# Patient Record
Sex: FEMALE | Race: OTHER | HISPANIC OR LATINO
[De-identification: names, ages, dates, MRNs, and addresses within clinical notes are randomized per-mention and may not be internally consistent; named-entity substitution may affect disease eponyms.]

---

## 2017-08-17 ENCOUNTER — APPOINTMENT (OUTPATIENT)
Dept: SURGERY | Facility: CLINIC | Age: 57
End: 2017-08-17

## 2017-08-17 VITALS
HEART RATE: 75 BPM | OXYGEN SATURATION: 99 % | DIASTOLIC BLOOD PRESSURE: 80 MMHG | WEIGHT: 170 LBS | TEMPERATURE: 97.5 F | SYSTOLIC BLOOD PRESSURE: 132 MMHG

## 2017-08-17 DIAGNOSIS — F15.90 OTHER STIMULANT USE, UNSPECIFIED, UNCOMPLICATED: ICD-10-CM

## 2017-08-17 DIAGNOSIS — Z56.0 UNEMPLOYMENT, UNSPECIFIED: ICD-10-CM

## 2017-08-17 DIAGNOSIS — Z86.39 PERSONAL HISTORY OF OTHER ENDOCRINE, NUTRITIONAL AND METABOLIC DISEASE: ICD-10-CM

## 2017-08-17 DIAGNOSIS — Z87.898 PERSONAL HISTORY OF OTHER SPECIFIED CONDITIONS: ICD-10-CM

## 2017-08-17 DIAGNOSIS — Z78.9 OTHER SPECIFIED HEALTH STATUS: ICD-10-CM

## 2017-08-17 DIAGNOSIS — K80.20 CALCULUS OF GALLBLADDER W/OUT CHOLECYSTITIS W/OUT OBSTRUCTION: ICD-10-CM

## 2017-08-17 RX ORDER — LEVOTHYROXINE SODIUM 25 UG/1
25 TABLET ORAL
Refills: 0 | Status: ACTIVE | COMMUNITY

## 2017-08-17 SDOH — ECONOMIC STABILITY - INCOME SECURITY: UNEMPLOYMENT, UNSPECIFIED: Z56.0

## 2018-04-26 ENCOUNTER — APPOINTMENT (OUTPATIENT)
Dept: SURGERY | Facility: CLINIC | Age: 58
End: 2018-04-26
Payer: MEDICAID

## 2018-04-26 VITALS
SYSTOLIC BLOOD PRESSURE: 116 MMHG | DIASTOLIC BLOOD PRESSURE: 75 MMHG | WEIGHT: 182 LBS | HEIGHT: 67 IN | BODY MASS INDEX: 28.56 KG/M2 | TEMPERATURE: 98.5 F | HEART RATE: 67 BPM

## 2018-04-26 PROCEDURE — 99213 OFFICE O/P EST LOW 20 MIN: CPT

## 2018-10-08 ENCOUNTER — APPOINTMENT (OUTPATIENT)
Dept: SURGERY | Facility: CLINIC | Age: 58
End: 2018-10-08
Payer: MEDICAID

## 2018-10-08 VITALS
SYSTOLIC BLOOD PRESSURE: 139 MMHG | DIASTOLIC BLOOD PRESSURE: 86 MMHG | HEIGHT: 67 IN | TEMPERATURE: 98.5 F | HEART RATE: 64 BPM | BODY MASS INDEX: 27.94 KG/M2 | WEIGHT: 178 LBS

## 2018-10-08 PROCEDURE — 99213 OFFICE O/P EST LOW 20 MIN: CPT

## 2019-10-07 ENCOUNTER — APPOINTMENT (OUTPATIENT)
Dept: SURGERY | Facility: CLINIC | Age: 59
End: 2019-10-07
Payer: MEDICAID

## 2019-10-07 VITALS
BODY MASS INDEX: 25.58 KG/M2 | HEIGHT: 67 IN | TEMPERATURE: 97.6 F | SYSTOLIC BLOOD PRESSURE: 117 MMHG | OXYGEN SATURATION: 99 % | HEART RATE: 61 BPM | DIASTOLIC BLOOD PRESSURE: 69 MMHG | WEIGHT: 163 LBS

## 2019-10-07 PROCEDURE — 99213 OFFICE O/P EST LOW 20 MIN: CPT

## 2019-10-07 RX ORDER — LOSARTAN POTASSIUM 100 MG/1
TABLET, FILM COATED ORAL
Refills: 0 | Status: ACTIVE | COMMUNITY

## 2019-10-07 NOTE — DATA REVIEWED
[FreeTextEntry1] : 	\par Exam requested by:\par CHASIDY ANDERSON MD\par [address] [facility], GROUND FLOOR\par [facility] [address]\par SITE PERFORMED: FLUSHING\par SITE PHONE: [phone]\par Patient: STANLEY BURT\par YOB: 1960\par Phone: [phone]\par MRN: [id number] Acc: [id number]\par Date of Exam: 09-\par  \par EXAM:  DIGITAL BILATERAL SCREENING MAMMOGRAM WITH CAD AND TOMOSYNTHESIS AND BREAST ULTRASOUND\par \par HISTORY:  Screening.\par \par CLINICAL BREAST EXAM:  None\par \par COMPARISON:  Comparison is made with mammogram dated 6/5/17.  \par \par MAMMOGRAM:\par TECHNIQUE:  Full-field digital bilateral mammography is performed in mediolateral oblique, and craniocaudad projections. CAD was utilized. Low-dose full-field digital breast tomosynthesis examination was performed with 3D acquisitions and C-View synthesized 2D reconstructed images. \par \par FINDINGS:\par BREAST COMPOSITION:  The breasts are extremely dense, which lowers the sensitivity of mammography.\par \par There is no dominant density or mass in either breast. There is no architectural distortion or suspicious cluster of microcalcification. No skin thickening or nipple retraction is present.\par \par ULTRASOUND:\par TECHNIQUE:  Sonographic examination is performed of both breasts with a high-resolution linear transducer. The upper outer, upper inner, lower outer, and lower inner quadrants, as well as the retroareolar and axillary regions were imaged. \par \par FINDINGS: \par \par Right breast\par \par Upper inner quadrant: There is no sonographic evidence of solid or cystic nodule.\par \par Lower inner quadrant: There is no sonographic evidence of solid or cystic nodule.\par \par Lower outer quadrant: There is no sonographic evidence of solid or cystic nodule.\par \par Upper-outer quadrant: At 9:00 5 cm from the nipple, there is a 6mm unchanged cyst. There is otherwise no sonographic evidence of solid or cystic nodule.\par \par Retroareolar: There is no sonographic evidence of solid or cystic nodule.\par \par Axilla: Unremarkable\par \par Left breast\par \par Upper-outer quadrant: At 2:00 6 cm from the nipple, there is an 8mm cyst not substantially changed. There is otherwise no sonographic evidence of solid or cystic nodule.\par \par Lower outer quadrant: There is no sonographic evidence of solid or cystic nodule.\par \par Lower inner quadrant: At 6:00 3 cm  from the nipple, there is an unchanged 8mm cyst. There is otherwise no sonographic evidence of solid or cystic nodule.\par \par Upper inner quadrant: There is no sonographic evidence of solid or cystic nodule.\par \par Retroareolar: There is no sonographic evidence of solid or cystic nodule.\par \par Axilla: Unremarkable\par \par IMPRESSION:  No mammographic or sonographic evidence of malignancy.\par \par FOLLOW-UP:  Annual screening.\par \par \par ASSESSMENT:  BI-RADS Category 2:  Benign.\par \par The false-negative rate of mammography is approximately 10%. \par \par As per the FDA requirements, a layman's letter has been generated and sent to your patient stating the results and recommendations of this breast imaging study. We have entered your patient into our reminder system and will notify them when they are due for their next breast imaging exam. \par \par Thank you for the opportunity to participate in the care of this patient.  \par  \par KIKI BRICENO MD  - Electronically Signed: 09- 12:16 PM \par Physician to Physician Direct Line is: [phone]\par Exam requested by:CHASIDY ANDERSON MD\par

## 2019-10-07 NOTE — HISTORY OF PRESENT ILLNESS
[de-identified] : This is  a 59 years old patient presenting today for a breast exam and to discuss the results of her recent  breast imaging. Patient had a BL breast US and   BL breast Mammogram on 09/20/2019. Deemed BIRADS category 2 .  Patient denies any trauma and she has no nipple discharge. Patient denies any fever, night sweats or loss of appetite.    There is no family history of breast carcinoma.  her last menstrual period was 6 years ago.  Patient is on no hormonal replacement therapy. \par  [de-identified] : patient diagnosed with hypertension  3 months ago \par \par

## 2019-10-07 NOTE — PHYSICAL EXAM
[Alert] : alert [Oriented to Place] : oriented to place [Oriented to Person] : oriented to person [Oriented to Time] : oriented to time [Anxious] : anxious [de-identified] :  Neck supple. Trachea midline. Thyroid isthmus barely palpable, lobes not felt. [de-identified] : The patient is alert, well-groomed, and cheerful. [de-identified] : No signs of  dyspnea, orthopnea. Good S1 and  S2 [de-identified] : BL breast are  symmetric. No masses; nipples without discharge. No palpable supraclavicular masses. Axillas are benign.\par \par  [de-identified] : Thorax symmetric with good excursion. Lungs resonant. Breath sounds vesicular with no added sounds.

## 2019-10-07 NOTE — PLAN
[FreeTextEntry1] : Patient is presenting for a follow up visit.  Patient is doing well. Results of her breast imaging and physical examination findings were discussed in details.   Patient was advised to have BL              breast US and Mammogram in  September 2020 and return after the tests. Importance of monthly self-breast examination was reinforced.  Patient's questions and concerns addressed to patient's satisfaction.\par  \par  Return after the tests or if needed . \par Self-breast examination    \par

## 2020-08-02 ENCOUNTER — EMERGENCY (EMERGENCY)
Facility: HOSPITAL | Age: 60
LOS: 1 days | Discharge: HOME | End: 2020-08-02
Attending: EMERGENCY MEDICINE
Payer: MEDICAID

## 2020-08-02 VITALS
SYSTOLIC BLOOD PRESSURE: 173 MMHG | HEART RATE: 84 BPM | RESPIRATION RATE: 16 BRPM | WEIGHT: 167.55 LBS | HEIGHT: 64 IN | TEMPERATURE: 98 F | DIASTOLIC BLOOD PRESSURE: 84 MMHG | OXYGEN SATURATION: 97 %

## 2020-08-02 VITALS
SYSTOLIC BLOOD PRESSURE: 116 MMHG | RESPIRATION RATE: 18 BRPM | TEMPERATURE: 99 F | OXYGEN SATURATION: 98 % | HEART RATE: 64 BPM | DIASTOLIC BLOOD PRESSURE: 71 MMHG

## 2020-08-02 LAB
ALBUMIN SERPL ELPH-MCNC: 4.1 G/DL — SIGNIFICANT CHANGE UP (ref 3.5–5)
ALP SERPL-CCNC: 107 U/L — SIGNIFICANT CHANGE UP (ref 40–120)
ALT FLD-CCNC: 29 U/L DA — SIGNIFICANT CHANGE UP (ref 10–60)
ANION GAP SERPL CALC-SCNC: 5 MMOL/L — SIGNIFICANT CHANGE UP (ref 5–17)
AST SERPL-CCNC: 22 U/L — SIGNIFICANT CHANGE UP (ref 10–40)
BILIRUB SERPL-MCNC: 1.1 MG/DL — SIGNIFICANT CHANGE UP (ref 0.2–1.2)
BUN SERPL-MCNC: 12 MG/DL — SIGNIFICANT CHANGE UP (ref 7–18)
CALCIUM SERPL-MCNC: 8.8 MG/DL — SIGNIFICANT CHANGE UP (ref 8.4–10.5)
CHLORIDE SERPL-SCNC: 109 MMOL/L — HIGH (ref 96–108)
CO2 SERPL-SCNC: 26 MMOL/L — SIGNIFICANT CHANGE UP (ref 22–31)
CREAT SERPL-MCNC: 0.59 MG/DL — SIGNIFICANT CHANGE UP (ref 0.5–1.3)
GLUCOSE SERPL-MCNC: 144 MG/DL — HIGH (ref 70–99)
HCT VFR BLD CALC: 39.5 % — SIGNIFICANT CHANGE UP (ref 34.5–45)
HGB BLD-MCNC: 12.5 G/DL — SIGNIFICANT CHANGE UP (ref 11.5–15.5)
MAGNESIUM SERPL-MCNC: 2.3 MG/DL — SIGNIFICANT CHANGE UP (ref 1.6–2.6)
MCHC RBC-ENTMCNC: 31.3 PG — SIGNIFICANT CHANGE UP (ref 27–34)
MCHC RBC-ENTMCNC: 31.6 GM/DL — LOW (ref 32–36)
MCV RBC AUTO: 98.8 FL — SIGNIFICANT CHANGE UP (ref 80–100)
NRBC # BLD: 0 /100 WBCS — SIGNIFICANT CHANGE UP (ref 0–0)
PLATELET # BLD AUTO: 242 K/UL — SIGNIFICANT CHANGE UP (ref 150–400)
POTASSIUM SERPL-MCNC: 3.9 MMOL/L — SIGNIFICANT CHANGE UP (ref 3.5–5.3)
POTASSIUM SERPL-SCNC: 3.9 MMOL/L — SIGNIFICANT CHANGE UP (ref 3.5–5.3)
PROT SERPL-MCNC: 8 G/DL — SIGNIFICANT CHANGE UP (ref 6–8.3)
RBC # BLD: 4 M/UL — SIGNIFICANT CHANGE UP (ref 3.8–5.2)
RBC # FLD: 12 % — SIGNIFICANT CHANGE UP (ref 10.3–14.5)
SODIUM SERPL-SCNC: 140 MMOL/L — SIGNIFICANT CHANGE UP (ref 135–145)
T4 AB SER-ACNC: 9.5 UG/DL — SIGNIFICANT CHANGE UP (ref 4.6–12)
TSH SERPL-MCNC: 1.79 UU/ML — SIGNIFICANT CHANGE UP (ref 0.34–4.82)
WBC # BLD: 9.25 K/UL — SIGNIFICANT CHANGE UP (ref 3.8–10.5)
WBC # FLD AUTO: 9.25 K/UL — SIGNIFICANT CHANGE UP (ref 3.8–10.5)

## 2020-08-02 PROCEDURE — 83735 ASSAY OF MAGNESIUM: CPT

## 2020-08-02 PROCEDURE — 93005 ELECTROCARDIOGRAM TRACING: CPT

## 2020-08-02 PROCEDURE — 93010 ELECTROCARDIOGRAM REPORT: CPT

## 2020-08-02 PROCEDURE — 84436 ASSAY OF TOTAL THYROXINE: CPT

## 2020-08-02 PROCEDURE — 99284 EMERGENCY DEPT VISIT MOD MDM: CPT | Mod: 25

## 2020-08-02 PROCEDURE — 99284 EMERGENCY DEPT VISIT MOD MDM: CPT

## 2020-08-02 PROCEDURE — 70450 CT HEAD/BRAIN W/O DYE: CPT

## 2020-08-02 PROCEDURE — 96374 THER/PROPH/DIAG INJ IV PUSH: CPT

## 2020-08-02 PROCEDURE — 85027 COMPLETE CBC AUTOMATED: CPT

## 2020-08-02 PROCEDURE — 36415 COLL VENOUS BLD VENIPUNCTURE: CPT

## 2020-08-02 PROCEDURE — 84443 ASSAY THYROID STIM HORMONE: CPT

## 2020-08-02 PROCEDURE — 80053 COMPREHEN METABOLIC PANEL: CPT

## 2020-08-02 PROCEDURE — 96375 TX/PRO/DX INJ NEW DRUG ADDON: CPT

## 2020-08-02 PROCEDURE — 70450 CT HEAD/BRAIN W/O DYE: CPT | Mod: 26

## 2020-08-02 RX ORDER — KETOROLAC TROMETHAMINE 30 MG/ML
15 SYRINGE (ML) INJECTION ONCE
Refills: 0 | Status: DISCONTINUED | OUTPATIENT
Start: 2020-08-02 | End: 2020-08-02

## 2020-08-02 RX ORDER — DIPHENHYDRAMINE HCL 50 MG
25 CAPSULE ORAL ONCE
Refills: 0 | Status: COMPLETED | OUTPATIENT
Start: 2020-08-02 | End: 2020-08-02

## 2020-08-02 RX ORDER — METOCLOPRAMIDE HCL 10 MG
10 TABLET ORAL ONCE
Refills: 0 | Status: COMPLETED | OUTPATIENT
Start: 2020-08-02 | End: 2020-08-02

## 2020-08-02 RX ADMIN — Medication 10 MILLIGRAM(S): at 14:29

## 2020-08-02 RX ADMIN — Medication 15 MILLIGRAM(S): at 14:29

## 2020-08-02 RX ADMIN — Medication 15 MILLIGRAM(S): at 16:41

## 2020-08-02 RX ADMIN — Medication 25 MILLIGRAM(S): at 14:29

## 2020-08-02 NOTE — ED PROVIDER NOTE - NSFOLLOWUPCLINICS_GEN_ALL_ED_FT
[facility] - Ophthalmology  Ophthalmology  [address], [address]  [place] [address]  Phone: [phone]  Fax:   Follow Up Time: 1-3 Days    [facility]  Neurology  [address] [place] [address]  Phone: [phone]  Fax: [phone]  Follow Up Time: 1-3 Days

## 2020-08-02 NOTE — ED PROVIDER NOTE - PATIENT PORTAL LINK FT
You can access the FollowMyHealth Patient Portal offered by [facility] by registering at the following website: [email or web address]. By joining [employer]’s FollowMyHealth portal, you will also be able to view your health information using other applications (apps) compatible with our system.

## 2020-08-02 NOTE — ED PROVIDER NOTE - PROGRESS NOTE DETAILS
EKG - nsr, rate 73, no ectopy EKG - nsr, rate 73, no ectopy  labs - wnl  CT head pending  Will sign out to day shift to f/u imaging and reassess Cho:  Pt received in signout to f/u CT head.  Pt feels better and CT read as unremarkable.  Advised strict return precautions and PMD, neuro, and ophtho.

## 2020-08-02 NOTE — ED ADULT NURSE NOTE - OBJECTIVE STATEMENT
Pt c/o headache and  palpitation  for few days. Pt denies falls. EKG ordered. No acute distress noted, denies chest pain, no shortness of breath indicated. Safety maintained.

## 2020-08-02 NOTE — ED PROVIDER NOTE - NSFOLLOWUPINSTRUCTIONS_ED_ALL_ED_FT
General Headache Without Cause  A headache is pain or discomfort that is felt around the head or neck area. There are many causes and types of headaches. In some cases, the cause may not be found.  Follow these instructions at home:  Watch your condition for any changes. Let your doctor know about them. Take these steps to help with your condition:  Managing pain         Take over-the-counter and prescription medicines only as told by your doctor.Lie down in a dark, quiet room when you have a headache.If told, put ice on your head and neck area:  Put ice in a plastic bag.Place a towel between your skin and the bag.Leave the ice on for 20 minutes, 2–3 times per day.If told, put heat on the affected area. Use the heat source that your doctor recommends, such as a moist heat pack or a heating pad.   Place a towel between your skin and the heat source. Leave the heat on for 20–30 minutes. Remove the heat if your skin turns bright red. This is very important if you are unable to feel pain, heat, or cold. You may have a greater risk of getting burned.Keep lights dim if bright lights bother you or make your headaches worse.Eating and drinking     Eat meals on a regular schedule.If you drink alcohol:   Limit how much you use to:   0–1 drink a day for women. 0–2 drinks a day for men. Be aware of how much alcohol is in your drink. In the [place] one drink equals one 12 oz bottle of beer (355 mL), one 5 oz glass of wine (148 mL), or one 1½ oz glass of hard liquor (44 mL).Stop drinking caffeine, or reduce how much caffeine you drink.General instructions        Keep a journal to find out if certain things bring on headaches. For example, write down:  What you eat and drink.How much sleep you get.Any change to your diet or medicines.Get a massage or try other ways to relax.Limit stress.Sit up straight. Do not tighten (tense) your muscles.Do not use any products that contain nicotine or tobacco. This includes cigarettes, e-cigarettes, and chewing tobacco. If you need help quitting, ask your doctor.Exercise regularly as told by your doctor.Get enough sleep. This often means 7–9 hours of sleep each night.Keep all follow-up visits as told by your doctor. This is important.Contact a doctor if:  Your symptoms are not helped by medicine.You have a headache that feels different than the other headaches.You feel sick to your stomach (nauseous) or you throw up (vomit).You have a fever.Get help right away if:  Your headache gets very bad quickly.Your headache gets worse after a lot of physical activity.You keep throwing up.You have a stiff neck.You have trouble seeing.You have trouble speaking.You have pain in the eye or ear.Your muscles are weak or you lose muscle control.You lose your balance or have trouble walking.You feel like you will pass out (faint) or you pass out.You are mixed up (confused).You have a seizure.Summary  A headache is pain or discomfort that is felt around the head or neck area.There are many causes and types of headaches. In some cases, the cause may not be found.Keep a journal to help find out what causes your headaches. Watch your condition for any changes. Let your doctor know about them.Contact a doctor if you have a headache that is different from usual, or if your headache is not helped by medicine.Get help right away if your headache gets very bad, you throw up, you have trouble seeing, you lose your balance, or you have a seizure.This information is not intended to replace advice given to you by your health care provider. Make sure you discuss any questions you have with your health care provider.    Document Released: 09/26/2009 Document Revised: 07/08/2019 Document Reviewed: 07/08/2019  Elsevier Patient Education © 2020 [employer] Inc[employer]

## 2020-08-02 NOTE — ED PROVIDER NOTE - NS ED ROS FT
CONSTITUTIONAL: no fever, no chills   EYES: no visual changes, no eye pain   ENMT: no nasal congestion, no throat pain, +floater   CARDIOVASCULAR: no chest pain, no edema, no palpitations   RESPIRATORY: no shortness of breath, no cough   GASTROINTESTINAL: no abdominal pain, no nausea, no vomiting, no diarrhea, no constipation   GENITOURINARY: no dysuria, no frequency  MUSCULOSKELETAL: no joint pains, no myalgias, no back pain   SKIN: no rashes  NEUROLOGICAL: no weakness, +headache, no dizziness, no slurred speech, no syncope   PSYCHIATRIC: no known mental health illness   HEME/LYMPH: no lymphadenopathy      All other ROS negative except as per HPI

## 2020-08-02 NOTE — ED PROVIDER NOTE - OBJECTIVE STATEMENT
59 yo F pmh of thyroid disease and HTN presents with HA x a few months, worse today. Had episode of palpitations today. Also with L eye floater. Denies n/v, trauma, eye pain, other acute complaints. Pt would like CT of head.

## 2020-10-15 ENCOUNTER — APPOINTMENT (OUTPATIENT)
Dept: SURGERY | Facility: CLINIC | Age: 60
End: 2020-10-15
Payer: MEDICAID

## 2020-10-15 VITALS
HEIGHT: 67 IN | BODY MASS INDEX: 27.47 KG/M2 | HEART RATE: 76 BPM | SYSTOLIC BLOOD PRESSURE: 130 MMHG | DIASTOLIC BLOOD PRESSURE: 82 MMHG | WEIGHT: 175 LBS | OXYGEN SATURATION: 99 % | TEMPERATURE: 98 F

## 2020-10-15 PROCEDURE — 99213 OFFICE O/P EST LOW 20 MIN: CPT

## 2020-10-15 NOTE — PHYSICAL EXAM
[Oriented to Person] : oriented to person [Oriented to Place] : oriented to place [Alert] : alert [Calm] : calm [Oriented to Time] : oriented to time [de-identified] : Neck supple. Trachea midline. Thyroid isthmus barely palpable, lobes not felt.\par   [de-identified] : She  is alert, well-groomed, and cheerful.\par   [de-identified] :  BL breast are symmetric. No masses; nipples without discharge. No palpable supraclavicular masses. Axillas are benign. [de-identified] : anicteric.  Nasal mucosa pink, septum midline. Oral mucosa pink.  Tongue midline, Pharynx without exudates.\par

## 2020-10-15 NOTE — HISTORY OF PRESENT ILLNESS
[de-identified] : Patient reports no interval changes to her overall health status or medical history  [de-identified] : This is  a 60 year   old patient presenting today for a breast exam and to discuss the results of her recent  breast imaging. She is also reporting left breast pain.  Patient had a BL breast US  and   BL breast Mammogram on 08/31/2020 .  Mammo was deemed BIRADS category 1 and US was deemed 3  and showed left breast cysts.    Ms. BUENO denies any trauma and she has no nipple discharge. Patient denies any fever, night sweats or loss of appetite.   There is no family history of breast carcinoma.  Patient is on no hormonal replacement therapy. She is postmenopausal

## 2020-10-15 NOTE — PLAN
[FreeTextEntry1] : Ms. BUENO  is presenting  today for an evaluation .  she is doing well and offers no complaints.  Results of  her recent  imaging and physical examination findings were discussed in details.   She  was advised to have left   breast US   in  February and return after the tests. Importance of monthly self-breast examination was reinforced.  Patient's questions and concerns addressed to patient's satisfaction.\par \par Vitamin E daily for breast pain \par Avoid caffein and Chocolates to prevent breast pain

## 2021-03-26 ENCOUNTER — EMERGENCY (EMERGENCY)
Facility: HOSPITAL | Age: 61
LOS: 1 days | Discharge: HOME | End: 2021-03-26
Attending: STUDENT IN AN ORGANIZED HEALTH CARE EDUCATION/TRAINING PROGRAM
Payer: MEDICAID

## 2021-03-26 VITALS
HEART RATE: 84 BPM | HEIGHT: 64 IN | RESPIRATION RATE: 20 BRPM | SYSTOLIC BLOOD PRESSURE: 142 MMHG | DIASTOLIC BLOOD PRESSURE: 83 MMHG | TEMPERATURE: 99 F | OXYGEN SATURATION: 98 % | WEIGHT: 175.05 LBS

## 2021-03-26 LAB
ALBUMIN SERPL ELPH-MCNC: 3.2 G/DL — LOW (ref 3.5–5)
ALP SERPL-CCNC: 94 U/L — SIGNIFICANT CHANGE UP (ref 40–120)
ALT FLD-CCNC: 40 U/L DA — SIGNIFICANT CHANGE UP (ref 10–60)
ANION GAP SERPL CALC-SCNC: 9 MMOL/L — SIGNIFICANT CHANGE UP (ref 5–17)
AST SERPL-CCNC: 25 U/L — SIGNIFICANT CHANGE UP (ref 10–40)
BASOPHILS # BLD AUTO: 0.02 K/UL — SIGNIFICANT CHANGE UP (ref 0–0.2)
BASOPHILS NFR BLD AUTO: 0.3 % — SIGNIFICANT CHANGE UP (ref 0–2)
BILIRUB SERPL-MCNC: 0.7 MG/DL — SIGNIFICANT CHANGE UP (ref 0.2–1.2)
BUN SERPL-MCNC: 5 MG/DL — LOW (ref 7–18)
CALCIUM SERPL-MCNC: 8.6 MG/DL — SIGNIFICANT CHANGE UP (ref 8.4–10.5)
CHLORIDE SERPL-SCNC: 104 MMOL/L — SIGNIFICANT CHANGE UP (ref 96–108)
CO2 SERPL-SCNC: 25 MMOL/L — SIGNIFICANT CHANGE UP (ref 22–31)
CREAT SERPL-MCNC: 0.52 MG/DL — SIGNIFICANT CHANGE UP (ref 0.5–1.3)
EOSINOPHIL # BLD AUTO: 0.1 K/UL — SIGNIFICANT CHANGE UP (ref 0–0.5)
EOSINOPHIL NFR BLD AUTO: 1.6 % — SIGNIFICANT CHANGE UP (ref 0–6)
GLUCOSE SERPL-MCNC: 138 MG/DL — HIGH (ref 70–99)
HCT VFR BLD CALC: 33 % — LOW (ref 34.5–45)
HGB BLD-MCNC: 10.8 G/DL — LOW (ref 11.5–15.5)
IMM GRANULOCYTES NFR BLD AUTO: 0.8 % — SIGNIFICANT CHANGE UP (ref 0–1.5)
LYMPHOCYTES # BLD AUTO: 0.94 K/UL — LOW (ref 1–3.3)
LYMPHOCYTES # BLD AUTO: 15.3 % — SIGNIFICANT CHANGE UP (ref 13–44)
MAGNESIUM SERPL-MCNC: 2.1 MG/DL — SIGNIFICANT CHANGE UP (ref 1.6–2.6)
MCHC RBC-ENTMCNC: 31.5 PG — SIGNIFICANT CHANGE UP (ref 27–34)
MCHC RBC-ENTMCNC: 32.7 GM/DL — SIGNIFICANT CHANGE UP (ref 32–36)
MCV RBC AUTO: 96.2 FL — SIGNIFICANT CHANGE UP (ref 80–100)
MONOCYTES # BLD AUTO: 0.45 K/UL — SIGNIFICANT CHANGE UP (ref 0–0.9)
MONOCYTES NFR BLD AUTO: 7.3 % — SIGNIFICANT CHANGE UP (ref 2–14)
NEUTROPHILS # BLD AUTO: 4.58 K/UL — SIGNIFICANT CHANGE UP (ref 1.8–7.4)
NEUTROPHILS NFR BLD AUTO: 74.7 % — SIGNIFICANT CHANGE UP (ref 43–77)
NRBC # BLD: 0 /100 WBCS — SIGNIFICANT CHANGE UP (ref 0–0)
PLATELET # BLD AUTO: 207 K/UL — SIGNIFICANT CHANGE UP (ref 150–400)
POTASSIUM SERPL-MCNC: 3.6 MMOL/L — SIGNIFICANT CHANGE UP (ref 3.5–5.3)
POTASSIUM SERPL-SCNC: 3.6 MMOL/L — SIGNIFICANT CHANGE UP (ref 3.5–5.3)
PROT SERPL-MCNC: 7.1 G/DL — SIGNIFICANT CHANGE UP (ref 6–8.3)
RBC # BLD: 3.43 M/UL — LOW (ref 3.8–5.2)
RBC # FLD: 11.5 % — SIGNIFICANT CHANGE UP (ref 10.3–14.5)
SODIUM SERPL-SCNC: 138 MMOL/L — SIGNIFICANT CHANGE UP (ref 135–145)
WBC # BLD: 6.14 K/UL — SIGNIFICANT CHANGE UP (ref 3.8–10.5)
WBC # FLD AUTO: 6.14 K/UL — SIGNIFICANT CHANGE UP (ref 3.8–10.5)

## 2021-03-26 PROCEDURE — 99283 EMERGENCY DEPT VISIT LOW MDM: CPT

## 2021-03-26 PROCEDURE — 93005 ELECTROCARDIOGRAM TRACING: CPT

## 2021-03-26 PROCEDURE — 99285 EMERGENCY DEPT VISIT HI MDM: CPT

## 2021-03-26 PROCEDURE — 85025 COMPLETE CBC W/AUTO DIFF WBC: CPT

## 2021-03-26 PROCEDURE — 83735 ASSAY OF MAGNESIUM: CPT

## 2021-03-26 PROCEDURE — 36415 COLL VENOUS BLD VENIPUNCTURE: CPT

## 2021-03-26 PROCEDURE — 80053 COMPREHEN METABOLIC PANEL: CPT

## 2021-03-26 RX ORDER — METOCLOPRAMIDE HCL 10 MG
10 TABLET ORAL ONCE
Refills: 0 | Status: COMPLETED | OUTPATIENT
Start: 2021-03-26 | End: 2021-03-26

## 2021-03-26 RX ORDER — ACETAMINOPHEN 500 MG
650 TABLET ORAL ONCE
Refills: 0 | Status: COMPLETED | OUTPATIENT
Start: 2021-03-26 | End: 2021-03-26

## 2021-03-26 RX ORDER — SODIUM CHLORIDE 9 MG/ML
1000 INJECTION INTRAMUSCULAR; INTRAVENOUS; SUBCUTANEOUS ONCE
Refills: 0 | Status: COMPLETED | OUTPATIENT
Start: 2021-03-26 | End: 2021-03-26

## 2021-03-26 RX ADMIN — Medication 650 MILLIGRAM(S): at 09:09

## 2021-03-26 RX ADMIN — Medication 10 MILLIGRAM(S): at 08:15

## 2021-03-26 RX ADMIN — SODIUM CHLORIDE 1000 MILLILITER(S): 9 INJECTION INTRAMUSCULAR; INTRAVENOUS; SUBCUTANEOUS at 08:15

## 2021-03-26 NOTE — ED PROVIDER NOTE - OBJECTIVE STATEMENT
62yo F pmhx headaches, tension-type, responsive to meds, p/w known Covid-19 infection currently and headache. patient states that she began to experience posterior head pain along L neck and into base of scalp yesterday after waking. patient states pain is aching, worse with stretching neck and turning head, improved at neutral position and not moving. patient denies frontal pain, other head pain, vision changes, dizziness. patient states that pain was not sudden, not thunderclap, not worst pain of her life. denies falls/trauma. patient states that she took ibuprofen for ha with minimal improvement, prompting visit to ED. Patient also endorsing having episode of palpitations 2 days ago that self resolved. Denies syncope, cp, sob, abd pain, cough. Endorses intermittent anxiety history. No other complaints at this time. Covid resolving, dx 3 weeks ago.

## 2021-03-26 NOTE — ED PROVIDER NOTE - CLINICAL SUMMARY MEDICAL DECISION MAKING FREE TEXT BOX
patient p/w basae of skull headache radiating up from L scalenes and trapezius muscle highly suspicious for msk etiology  no neuro symptoms, incr pain w sidebending movement, current covid infection, correlating with presentation of myalgias  previous CTH less than 1 yr ago for same presentation negative, no neuro deficits  no suspicion vasc etiology based on h&p

## 2021-03-26 NOTE — ED PROVIDER NOTE - PROGRESS NOTE DETAILS
patient states that she feels improved after tylenol and fluids. patient does not have allergy to tylenol. patient now stating that she does not want CTH. shared deciison making performed. in agreement tht pain appears to be 2/2 msk etiology given worse w movement, palpation of trapezius, a/w myalgias and current covid infection.   repeat neuro exams benign.  labs unremarkable. communicated to patient. will dc w pcp f/up, pain control instructions and manage expectations for covid infection and symptoms. patient cont to feel well, stating pain is minimal now, only occurs with neck sidebending to L and palpation of trap.   does not want to CT. stable for dc w eduation, pcp f/up and return precautions.

## 2021-03-26 NOTE — ED PROVIDER NOTE - CARE PLAN
Principal Discharge DX:	Episodic tension-type headache, not intractable  Secondary Diagnosis:	COVID-19  Secondary Diagnosis:	Palpitations

## 2021-03-26 NOTE — ED ADULT NURSE NOTE - OBJECTIVE STATEMENT
pt is here for headache.  pt stated that pain to back of head, denied chest pain or sob, denied N/v/D, no distress noted at this time.

## 2021-03-26 NOTE — ED PROVIDER NOTE - PATIENT PORTAL LINK FT
You can access the FollowMyHealth Patient Portal offered by [facility] by registering at the following website: [email or web address]. By joining [employer]’s FollowMyHealth portal, you will also be able to view your health information using other applications (apps) compatible with our system.

## 2021-03-26 NOTE — ED PROVIDER NOTE - MUSCULOSKELETAL, MLM
Spine appears normal, range of motion is not limited, no muscle or joint tenderness +ttp L trapezius region extending into neck, reproducing pain into base of scalp

## 2021-03-26 NOTE — ED ADULT TRIAGE NOTE - CHIEF COMPLAINT QUOTE
C/o body aches with headache not relieved with ibuprofen.  Positive covid since Monday.  Pain localised to back of head

## 2021-04-15 ENCOUNTER — EMERGENCY (EMERGENCY)
Facility: HOSPITAL | Age: 61
LOS: 1 days | Discharge: HOME | End: 2021-04-15
Attending: STUDENT IN AN ORGANIZED HEALTH CARE EDUCATION/TRAINING PROGRAM
Payer: MEDICAID

## 2021-04-15 VITALS
OXYGEN SATURATION: 97 % | RESPIRATION RATE: 17 BRPM | TEMPERATURE: 99 F | SYSTOLIC BLOOD PRESSURE: 115 MMHG | HEART RATE: 90 BPM | DIASTOLIC BLOOD PRESSURE: 71 MMHG

## 2021-04-15 VITALS
DIASTOLIC BLOOD PRESSURE: 98 MMHG | RESPIRATION RATE: 18 BRPM | WEIGHT: 175.05 LBS | HEART RATE: 113 BPM | HEIGHT: 64 IN | SYSTOLIC BLOOD PRESSURE: 166 MMHG | OXYGEN SATURATION: 99 % | TEMPERATURE: 99 F

## 2021-04-15 PROBLEM — G44.219 EPISODIC TENSION-TYPE HEADACHE, NOT INTRACTABLE: Chronic | Status: ACTIVE | Noted: 2021-03-26

## 2021-04-15 LAB
ALBUMIN SERPL ELPH-MCNC: 4.1 G/DL — SIGNIFICANT CHANGE UP (ref 3.5–5)
ALP SERPL-CCNC: 122 U/L — HIGH (ref 40–120)
ALT FLD-CCNC: 41 U/L DA — SIGNIFICANT CHANGE UP (ref 10–60)
ANION GAP SERPL CALC-SCNC: 8 MMOL/L — SIGNIFICANT CHANGE UP (ref 5–17)
APPEARANCE UR: CLEAR — SIGNIFICANT CHANGE UP
AST SERPL-CCNC: 16 U/L — SIGNIFICANT CHANGE UP (ref 10–40)
BASOPHILS # BLD AUTO: 0.05 K/UL — SIGNIFICANT CHANGE UP (ref 0–0.2)
BASOPHILS NFR BLD AUTO: 0.7 % — SIGNIFICANT CHANGE UP (ref 0–2)
BILIRUB SERPL-MCNC: 0.8 MG/DL — SIGNIFICANT CHANGE UP (ref 0.2–1.2)
BILIRUB UR-MCNC: NEGATIVE — SIGNIFICANT CHANGE UP
BUN SERPL-MCNC: 11 MG/DL — SIGNIFICANT CHANGE UP (ref 7–18)
CALCIUM SERPL-MCNC: 9.2 MG/DL — SIGNIFICANT CHANGE UP (ref 8.4–10.5)
CHLORIDE SERPL-SCNC: 105 MMOL/L — SIGNIFICANT CHANGE UP (ref 96–108)
CO2 SERPL-SCNC: 25 MMOL/L — SIGNIFICANT CHANGE UP (ref 22–31)
COLOR SPEC: YELLOW — SIGNIFICANT CHANGE UP
CREAT SERPL-MCNC: 0.52 MG/DL — SIGNIFICANT CHANGE UP (ref 0.5–1.3)
D DIMER BLD IA.RAPID-MCNC: 176 NG/ML DDU — SIGNIFICANT CHANGE UP
DIFF PNL FLD: NEGATIVE — SIGNIFICANT CHANGE UP
EOSINOPHIL # BLD AUTO: 0.12 K/UL — SIGNIFICANT CHANGE UP (ref 0–0.5)
EOSINOPHIL NFR BLD AUTO: 1.6 % — SIGNIFICANT CHANGE UP (ref 0–6)
GLUCOSE SERPL-MCNC: 146 MG/DL — HIGH (ref 70–99)
GLUCOSE UR QL: NEGATIVE — SIGNIFICANT CHANGE UP
HCT VFR BLD CALC: 37.8 % — SIGNIFICANT CHANGE UP (ref 34.5–45)
HGB BLD-MCNC: 12 G/DL — SIGNIFICANT CHANGE UP (ref 11.5–15.5)
IMM GRANULOCYTES NFR BLD AUTO: 0.3 % — SIGNIFICANT CHANGE UP (ref 0–1.5)
KETONES UR-MCNC: NEGATIVE — SIGNIFICANT CHANGE UP
LEUKOCYTE ESTERASE UR-ACNC: NEGATIVE — SIGNIFICANT CHANGE UP
LYMPHOCYTES # BLD AUTO: 0.93 K/UL — LOW (ref 1–3.3)
LYMPHOCYTES # BLD AUTO: 12.6 % — LOW (ref 13–44)
MAGNESIUM SERPL-MCNC: 2.3 MG/DL — SIGNIFICANT CHANGE UP (ref 1.6–2.6)
MCHC RBC-ENTMCNC: 31.3 PG — SIGNIFICANT CHANGE UP (ref 27–34)
MCHC RBC-ENTMCNC: 31.7 GM/DL — LOW (ref 32–36)
MCV RBC AUTO: 98.4 FL — SIGNIFICANT CHANGE UP (ref 80–100)
MONOCYTES # BLD AUTO: 0.26 K/UL — SIGNIFICANT CHANGE UP (ref 0–0.9)
MONOCYTES NFR BLD AUTO: 3.5 % — SIGNIFICANT CHANGE UP (ref 2–14)
NEUTROPHILS # BLD AUTO: 6 K/UL — SIGNIFICANT CHANGE UP (ref 1.8–7.4)
NEUTROPHILS NFR BLD AUTO: 81.3 % — HIGH (ref 43–77)
NITRITE UR-MCNC: NEGATIVE — SIGNIFICANT CHANGE UP
NRBC # BLD: 0 /100 WBCS — SIGNIFICANT CHANGE UP (ref 0–0)
PH UR: 6.5 — SIGNIFICANT CHANGE UP (ref 5–8)
PLATELET # BLD AUTO: 305 K/UL — SIGNIFICANT CHANGE UP (ref 150–400)
POTASSIUM SERPL-MCNC: 3.8 MMOL/L — SIGNIFICANT CHANGE UP (ref 3.5–5.3)
POTASSIUM SERPL-SCNC: 3.8 MMOL/L — SIGNIFICANT CHANGE UP (ref 3.5–5.3)
PROT SERPL-MCNC: 8.3 G/DL — SIGNIFICANT CHANGE UP (ref 6–8.3)
PROT UR-MCNC: NEGATIVE — SIGNIFICANT CHANGE UP
RBC # BLD: 3.84 M/UL — SIGNIFICANT CHANGE UP (ref 3.8–5.2)
RBC # FLD: 12.5 % — SIGNIFICANT CHANGE UP (ref 10.3–14.5)
SODIUM SERPL-SCNC: 138 MMOL/L — SIGNIFICANT CHANGE UP (ref 135–145)
SP GR SPEC: 1.01 — SIGNIFICANT CHANGE UP (ref 1.01–1.02)
UROBILINOGEN FLD QL: NEGATIVE — SIGNIFICANT CHANGE UP
WBC # BLD: 7.38 K/UL — SIGNIFICANT CHANGE UP (ref 3.8–10.5)
WBC # FLD AUTO: 7.38 K/UL — SIGNIFICANT CHANGE UP (ref 3.8–10.5)

## 2021-04-15 PROCEDURE — 84484 ASSAY OF TROPONIN QUANT: CPT

## 2021-04-15 PROCEDURE — 81003 URINALYSIS AUTO W/O SCOPE: CPT

## 2021-04-15 PROCEDURE — 71046 X-RAY EXAM CHEST 2 VIEWS: CPT

## 2021-04-15 PROCEDURE — 99284 EMERGENCY DEPT VISIT MOD MDM: CPT | Mod: 25

## 2021-04-15 PROCEDURE — 83880 ASSAY OF NATRIURETIC PEPTIDE: CPT

## 2021-04-15 PROCEDURE — 96374 THER/PROPH/DIAG INJ IV PUSH: CPT

## 2021-04-15 PROCEDURE — 93005 ELECTROCARDIOGRAM TRACING: CPT

## 2021-04-15 PROCEDURE — 83735 ASSAY OF MAGNESIUM: CPT

## 2021-04-15 PROCEDURE — 80053 COMPREHEN METABOLIC PANEL: CPT

## 2021-04-15 PROCEDURE — 71046 X-RAY EXAM CHEST 2 VIEWS: CPT | Mod: 26

## 2021-04-15 PROCEDURE — 99285 EMERGENCY DEPT VISIT HI MDM: CPT

## 2021-04-15 PROCEDURE — 85379 FIBRIN DEGRADATION QUANT: CPT

## 2021-04-15 PROCEDURE — 85025 COMPLETE CBC W/AUTO DIFF WBC: CPT

## 2021-04-15 PROCEDURE — 36415 COLL VENOUS BLD VENIPUNCTURE: CPT

## 2021-04-15 RX ORDER — FAMOTIDINE 10 MG/ML
20 INJECTION INTRAVENOUS ONCE
Refills: 0 | Status: COMPLETED | OUTPATIENT
Start: 2021-04-15 | End: 2021-04-15

## 2021-04-15 RX ORDER — SODIUM CHLORIDE 9 MG/ML
1000 INJECTION INTRAMUSCULAR; INTRAVENOUS; SUBCUTANEOUS ONCE
Refills: 0 | Status: COMPLETED | OUTPATIENT
Start: 2021-04-15 | End: 2021-04-15

## 2021-04-15 RX ADMIN — FAMOTIDINE 20 MILLIGRAM(S): 10 INJECTION INTRAVENOUS at 13:14

## 2021-04-15 RX ADMIN — SODIUM CHLORIDE 1000 MILLILITER(S): 9 INJECTION INTRAMUSCULAR; INTRAVENOUS; SUBCUTANEOUS at 13:00

## 2021-04-15 NOTE — ED PROVIDER NOTE - CLINICAL SUMMARY MEDICAL DECISION MAKING FREE TEXT BOX
62 y/o F pt presents with unwellness feeling and intermittent palpitations. Patient is known to ED and extremely anxious about her health. I had an extensive discussion with patient regarding management of expectations and in the course of COVID-19 explained that it has been 2.5 weeks and a multitude of symptoms can be felt for a long period of time. Discussed I will perform labs, EKG, CXR and if negative, patient will need to follow up as outpatient. Explained to patient she cannot see cardiologist here and the ED will evaluate her for acute or emergent conditions if nonexistent will have to follow up as outpatient. Patient understands and agrees with plan.

## 2021-04-15 NOTE — ED PROVIDER NOTE - CARE PLAN
Principal Discharge DX:	History of COVID-19   Principal Discharge DX:	Chest tightness  Secondary Diagnosis:	History of COVID-19

## 2021-04-15 NOTE — ED PROVIDER NOTE - OBJECTIVE STATEMENT
60 y/o F pt with history of COVID-19 diagnosed 2.5 weeks ago presents with intermittent palpitations and general unwellness. Patient states she does not feel well and was previously here with COVID-19 with CXR negative, unremarkable workup and discharged home. Patient now c/o intermittent palpitations in the morning. Patient was evaluated by her PCP with a negative workup and referred to cardiology but her appointment is not for another month and she cannot wait that long. Patient denies chest pain, chest tightness, shortness of breath, fever, chills, syncope, abdominal pain, nausea, vomiting, and weakness.

## 2021-04-15 NOTE — ED PROVIDER NOTE - MUSCULOSKELETAL, MLM
Spine appears normal, range of motion is not limited, no muscle or joint tenderness, no calf pain, no lower extremity edema.

## 2021-04-15 NOTE — ED PROVIDER NOTE - PATIENT PORTAL LINK FT
You can access the FollowMyHealth Patient Portal offered by [facility] by registering at the following website: [email or web address]. By joining [employer]’s FollowMyHealth portal, you will also be able to view your health information using other applications (apps) compatible with our system. You can access the FollowMyHealth Patient Portal offered by [facility] by registering at the following website: [email or web address]. By joining [employer]’s FollowMyHealth portal, you will also be able to view your health information using other applications (apps) compatible with our system.

## 2021-04-15 NOTE — ED PROVIDER NOTE - PROGRESS NOTE DETAILS
Patient expressing frustration and does not seem to understand . Patient reports intermittent tiredness feeling and "vague" symptoms night be attributable to COVID-19. Labs reviewed and CXR within normal limits however given patient concern, will also perform Troponin, BNP and reassess. labs normal home with prompt cardiology followup

## 2021-04-15 NOTE — ED ADULT NURSE NOTE - OBJECTIVE STATEMENT
axox3 ,nad , palpitation for 3 weeks and sob , and burning sensation in chest area mid sternal , r shoulder pain , left under knee pain , also complains bilateral arms numbness after being sick with COVID, general weakness

## 2021-04-15 NOTE — ED PROVIDER NOTE - ATTESTATION, MLM
vomiting I have reviewed and confirmed nurses' notes for patient's medications, allergies, medical history, and surgical history.

## 2021-04-15 NOTE — ED PROVIDER NOTE - CHPI ED SYMPTOMS NEG
no chest tightness, no abdominal pain/no chest pain/no dizziness/no fever/no nausea/no shortness of breath/no syncope/no vomiting/no chills

## 2021-05-21 ENCOUNTER — APPOINTMENT (OUTPATIENT)
Dept: GASTROENTEROLOGY | Facility: CLINIC | Age: 61
End: 2021-05-21
Payer: MEDICAID

## 2021-05-21 ENCOUNTER — LABORATORY RESULT (OUTPATIENT)
Age: 61
End: 2021-05-21

## 2021-05-21 VITALS
HEART RATE: 79 BPM | OXYGEN SATURATION: 97 % | HEIGHT: 67 IN | WEIGHT: 171 LBS | TEMPERATURE: 97.8 F | BODY MASS INDEX: 26.84 KG/M2 | DIASTOLIC BLOOD PRESSURE: 80 MMHG | SYSTOLIC BLOOD PRESSURE: 120 MMHG

## 2021-05-21 DIAGNOSIS — R10.11 RIGHT UPPER QUADRANT PAIN: ICD-10-CM

## 2021-05-21 DIAGNOSIS — K21.9 GASTRO-ESOPHAGEAL REFLUX DISEASE W/OUT ESOPHAGITIS: ICD-10-CM

## 2021-05-21 DIAGNOSIS — K59.00 CONSTIPATION, UNSPECIFIED: ICD-10-CM

## 2021-05-21 PROCEDURE — 99204 OFFICE O/P NEW MOD 45 MIN: CPT

## 2021-05-21 RX ORDER — FAMOTIDINE 40 MG/1
40 TABLET, FILM COATED ORAL
Qty: 60 | Refills: 3 | Status: ACTIVE | COMMUNITY
Start: 2021-05-21 | End: 1900-01-01

## 2021-05-21 RX ORDER — DICYCLOMINE HYDROCHLORIDE 10 MG/1
10 CAPSULE ORAL 3 TIMES DAILY
Qty: 90 | Refills: 3 | Status: ACTIVE | COMMUNITY
Start: 2021-05-21 | End: 1900-01-01

## 2021-05-21 NOTE — ASSESSMENT
[FreeTextEntry1] : Abdominal Pain: The patient complains of abdominal pain. The patient is to avoid nonsteroidal anti-inflammatory drugs and aspirin.  I recommend a low FOD-MAP diet.  I recommend a trial of Dicyclomine 10 mg tablet PO 3 times a day PRN for the abdominal pain.\par Dyspepsia: The patient complains of dyspeptic symptoms.  The patient was advised to abide by an anti-gas diet.  The patient was given a pamphlet for anti-gas.  The patient and I reviewed the anti-gas diet at length. The patient is to start on a trial of Phazyme one tablet 3 times a day p.r.n. abdominal pain and gas.\par GERD: The patient was advised to avoid late-night meals and dietary indiscretions.  The patient was advised to avoid fried and fatty foods.  The patient was advised to abide by an anti-GERD diet. The patient was given a pamphlet foranti-GERD.  The patient and I reviewed the anti-GERD diet at length.I recommend a trial of Pepcid 40 mg twice a day x 3 months for the symptoms.\par Constipation: The patient complains of constipation. I recommend a high-fiber diet. I recommend a trial of a probiotic such as Align once a day. I recommend a trial of Metamucil once a day for fiber supplementation. I recommend a trial of Miralax 1 packet once a day for the constipation. If the symptoms persist, the patient may require a colonoscopy to assess the symptoms.  The patient was told of the risks and benefits of the procedure.  The patient was told of the risks of perforation, emergency surgery, bleeding, infections and missed lesions.  The patient agreed and will followup to reassess the symptoms.  \par Prior Endoscopic Procedures: The patient had a prior upper endoscopy and colonoscopy performed by another gastroenterologist, Dr. Suzanne Lynch.  I will try to obtain the prior upper endoscopy and colonoscopy reports.  The patient is to sign a record release to obtain those records.\par Blood Work: I recommend blood work to assess the liver. I recommend a CBC, SMA 24, amylase, lipase, ESR, TFTs, MARLENI, rheumatoid factor, celiac sprue panel, IgA, profile for hepatitis A, B, C. ,AFP, alpha 1 anti-trypsin  antibody, ceruloplasmin level, iron, TIBC, ferritin level, AMA, anti smooth muscle antibody and PT/INR/PTT.  I also recommend obtaining the recent blood work performed by the patient's PMD.\par Imaging Study: I recommend an imaging study to assess the symptoms. I recommend an abdominal ultrasound to assess the liver parenchyma and for liver lesions.\par Fatty Liver:  The patient had an imaging study suggestive of fatty infiltration of the liver.  The patient denies any jaundice or pruritus.  The patient denies any alcohol use.  The patient denies taking large doses of nonsteroidal anti-inflammatory drugs or acetaminophen.  The findings are suggestive of fatty liver.  The patient and I had a long discussion regarding the risks of fatty liver and possible progression to cirrhosis.  The patient was told of the possible increased risk of developing liver failure, cirrhosis, ascites, GI bleeding secondary to varices, hepatic encephalopathy, bleeding tendencies and liver cancer.  The patient was told of the importance of follow-up.  The patient was advised to follow up every 6 months for blood work and imaging studies. The patient agreed and will follow up. The patient was advised to lose weight. I recommend a trial of vitamin E supplementation for the fatty liver.  If the liver enzymes remain elevated, the patient may require a trial of Pioglitazone for the fatty liver. I recommend avoid alcohol and hepato-toxic agents.  The patient was also advised to avoid NSAIDs, Acetaminophen and any other hepatotoxic drugs. The patient was also advised not to share needles, razors, scissors, nail clippers, etc.. The patient is to continue close follow-up in our office for blood work and exams.  If the liver enzymes remain elevated, the patient may require a CT guided liver biopsy to assess the liver parenchyma for possible treatment.  We had a long discussion regarding the risks and benefits of the procedure.  The patient was told of the risks of bleeding, perforation, infections, emergency surgery and missing lesions.  The patient agreed and will follow-up to reassess the symptoms.  \par If the symptoms persist, the patient may require an upper endoscopy to assess for peptic ulcer disease versus esophagitis.  The patient was told of the risks and benefits of the procedure.  The patient was told of the risks of perforation, emergency surgery, bleeding, infections and missed lesions.  The patient agreed and will follow-up to reassess the symptoms.\par Follow-up: The patient is to follow-up in the office in 2 to 3 months to reassess the symptoms. The patient was told to call the office if any further problems. \par

## 2021-05-21 NOTE — REVIEW OF SYSTEMS
[Feeling Tired] : feeling tired [Eyesight Problems] : eyesight problems [Abdominal Pain] : abdominal pain [Constipation] : constipation [Heartburn] : heartburn [Arthralgias] : arthralgias [Itching] : itching [Dizziness] : dizziness [Negative] : Heme/Lymph [FreeTextEntry8] : polyuria, nocturia [de-identified] : fay

## 2021-05-21 NOTE — HISTORY OF PRESENT ILLNESS
[None] : had no significant interval events [Nausea] : denies nausea [Vomiting] : denies vomiting [Diarrhea] : denies diarrhea [Yellow Skin Or Eyes (Jaundice)] : denies jaundice [Rectal Pain] : denies rectal pain [Heartburn] : heartburn [Constipation] : constipation [Abdominal Swelling] : abdominal swelling [Abdominal Pain] : abdominal pain [GERD] : gastroesophageal reflux disease [Cholelithiasis] : cholelithiasis [Cholecystectomy] : cholecystectomy [Wt Gain ___ Lbs] : no recent weight gain [Wt Loss ___ Lbs] : no recent weight loss [Hiatus Hernia] : no hiatus hernia [Peptic Ulcer Disease] : no peptic ulcer disease [Pancreatitis] : no pancreatitis [Kidney Stone] : no kidney stone [Inflammatory Bowel Disease] : no inflammatory bowel disease [Irritable Bowel Syndrome] : no irritable bowel syndrome [Diverticulitis] : no diverticulitis [Alcohol Abuse] : no alcohol abuse [Malignancy] : no malignancy [Appendectomy] : no appendectomy [Abdominal Surgery] : no abdominal surgery [de-identified] : The patient is a 61-year-old  female with past medical history significant for hypertension and hypothyroidism, coronary artery disease who was referred to my office by Dr. Radha Pond for abdominal pain, dyspepsia and gastroesophageal reflux disease. The patient also admits to having constipation, change in bowel habits and change in caliber of stool. I was asked to render an opinion for consultation for the above complaints.   The patient states that she is feeling uncomfortable x 8 months.   The patient denies any prior exposure to hepatitis A, B or C.  The patient denies any large doses of nonsteroidal anti-inflammatory drugs or acetaminophen.   The patient denies sharing needles, razors, nail clippers, nail files, scissors, et cetera.  The patient denies any EtOH abuse, cocaine use or intravenous drug use.   The patient denies any prior surgery, blood transfusions, sexual indiscretions, tattoos or piercing.  The patient admits to having prior surgery.  The history of surgery is significant for a prior cholecystectomy, tubal ligation and .  The patient admits to a family history of GI or liver problems.  The patient’s mother had a history of colitis.   The patient complains of abdominal pain.  The patient describes the abdominal pain as a sharp, crampy, intermittent right upper quadrant discomfort that is nonradiating in nature.  The abdominal pain is worse with meals and improves with passing gas or having a bowel movement.  The abdominal pain is described as being mild in nature.  The abdominal pain occurs during the day.  The abdominal pain can occur at any time.   The abdominal pain never has awakened the patient from sleep.  The abdominal pain is not relieved with any medications.  The abdominal pain is associated with abdominal gas and bloating.  The patient denies any nausea or vomiting.  The patient complains of gastroesophageal reflux disease but denies any dysphagia. The gastroesophageal reflux disease is worse after meals and late at night and in the early morning. The gastroesophageal reflux disease is not improved with proton pump inhibitors, H2 blockers and antacids.  The patient denies any atypical chest pain, shortness of breath or palpitations.  The patient denies any diaphoresis. The patient complains of constipation but denies any diarrhea.  The patient has 1 bowel movement a day.  The patient complains of a change in bowel habits.  The patient complains of a change in caliber of stool.   The patient denies having mucus discharge with the bowel movements.  The patient denies any bright red blood per rectum, melena or hematemesis.  The patient complains of rectal pulsation but denies any rectal pain or rectal pruritus. The patient denies any weight loss or anorexia.  She denies any fevers or chills.  The patient complains of occasional pruritus but denies any jaundice.  The patient complains of chronic lower back pain.  The patient had a colonoscopy to the cecum performed at the office on 2011.  The study was very limited secondary to retained solid and liquid stool scattered throughout the colon but no gross lesions were noted.   Unable to comment on small colonic polyps or masses secondary to the poor prep.  The findings revealed mild left-sided diverticulosis and small internal hemorrhoids.  There were no polyps, masses, AVMs or colitis noted.  The patient tolerated the procedure well. The patient admits to having a prior upper endoscopy and colonoscopy performed by another gastroenterologist, Dr. Suzanne Lynch.  According to the patient, the upper endoscopic findings revealed severe gastritis.  The colonoscopy performed on 2018 findings revealed internal hemorrhoids.   The patient's last menstrual period was age 52.  The patient is a .  The patient's first menstrual period was at age 14. The patient admits to a family history of GI problems.  The patient’s mother had a history of colitis. [de-identified] : (-) smoking, (-) ETOH, (-) IVDA\par

## 2021-05-24 ENCOUNTER — APPOINTMENT (OUTPATIENT)
Dept: CARDIOLOGY | Facility: CLINIC | Age: 61
End: 2021-05-24

## 2021-05-24 LAB
A1AT SERPL-MCNC: 115 MG/DL
AFP-TM SERPL-MCNC: 2.3 NG/ML
ALBUMIN SERPL ELPH-MCNC: 4.8 G/DL
ALP BLD-CCNC: 105 U/L
ALT SERPL-CCNC: 27 U/L
AMYLASE/CREAT SERPL: 46 U/L
ANION GAP SERPL CALC-SCNC: 14 MMOL/L
APTT BLD: 36.3 SEC
AST SERPL-CCNC: 19 U/L
BASOPHILS # BLD AUTO: 0.06 K/UL
BASOPHILS NFR BLD AUTO: 0.9 %
BILIRUB SERPL-MCNC: 1.1 MG/DL
BUN SERPL-MCNC: 12 MG/DL
CALCIUM SERPL-MCNC: 9.8 MG/DL
CERULOPLASMIN SERPL-MCNC: 24 MG/DL
CHLORIDE SERPL-SCNC: 102 MMOL/L
CHOLEST SERPL-MCNC: 156 MG/DL
CO2 SERPL-SCNC: 25 MMOL/L
CREAT SERPL-MCNC: 0.52 MG/DL
EOSINOPHIL # BLD AUTO: 1 K/UL
EOSINOPHIL NFR BLD AUTO: 14.8 %
ERYTHROCYTE [SEDIMENTATION RATE] IN BLOOD BY WESTERGREN METHOD: 28 MM/HR
FERRITIN SERPL-MCNC: 233 NG/ML
GGT SERPL-CCNC: 16 U/L
GLUCOSE SERPL-MCNC: 105 MG/DL
HBV CORE IGG+IGM SER QL: NONREACTIVE
HBV CORE IGM SER QL: NONREACTIVE
HBV E AB SER QL: NONREACTIVE
HBV E AG SER QL: NONREACTIVE
HBV SURFACE AB SER QL: NONREACTIVE
HBV SURFACE AG SER QL: NONREACTIVE
HCT VFR BLD CALC: 42.7 %
HCV AB SER QL: NONREACTIVE
HCV S/CO RATIO: 0.19 S/CO
HDLC SERPL-MCNC: 51 MG/DL
HEMOCCULT STL QL: NEGATIVE
HEPATITIS A IGG ANTIBODY: REACTIVE
HGB BLD-MCNC: 12.7 G/DL
IGA SER QL IEP: 489 MG/DL
IMM GRANULOCYTES NFR BLD AUTO: 0.1 %
INR PPP: 1.12 RATIO
IRON SATN MFR SERPL: 35 %
IRON SERPL-MCNC: 123 UG/DL
LDLC SERPL CALC-MCNC: 92 MG/DL
LPL SERPL-CCNC: 28 U/L
LYMPHOCYTES # BLD AUTO: 1.64 K/UL
LYMPHOCYTES NFR BLD AUTO: 24.3 %
MAN DIFF?: NORMAL
MCHC RBC-ENTMCNC: 29.7 GM/DL
MCHC RBC-ENTMCNC: 31.1 PG
MCV RBC AUTO: 104.7 FL
MONOCYTES # BLD AUTO: 0.35 K/UL
MONOCYTES NFR BLD AUTO: 5.2 %
NEUTROPHILS # BLD AUTO: 3.68 K/UL
NEUTROPHILS NFR BLD AUTO: 54.7 %
NONHDLC SERPL-MCNC: 105 MG/DL
PLATELET # BLD AUTO: 290 K/UL
POTASSIUM SERPL-SCNC: 4.4 MMOL/L
PROT SERPL-MCNC: 7.4 G/DL
PT BLD: 13.2 SEC
RBC # BLD: 4.08 M/UL
RBC # FLD: 12.8 %
RHEUMATOID FACT SER QL: <10 IU/ML
SODIUM SERPL-SCNC: 141 MMOL/L
TIBC SERPL-MCNC: 353 UG/DL
TRIGL SERPL-MCNC: 67 MG/DL
TSH SERPL-ACNC: 3.11 UIU/ML
UIBC SERPL-MCNC: 230 UG/DL
WBC # FLD AUTO: 6.74 K/UL

## 2021-05-25 LAB
GLIADIN IGA SER QL: 7.2 UNITS
GLIADIN IGG SER QL: <5 UNITS
GLIADIN PEPTIDE IGA SER-ACNC: NEGATIVE
GLIADIN PEPTIDE IGG SER-ACNC: NEGATIVE
MITOCHONDRIA AB SER IF-ACNC: NORMAL
SMOOTH MUSCLE AB SER QL IF: NORMAL
TTG IGA SER IA-ACNC: <1.2 U/ML
TTG IGA SER-ACNC: NEGATIVE
TTG IGG SER IA-ACNC: 3.6 U/ML
TTG IGG SER IA-ACNC: NEGATIVE

## 2021-05-26 LAB — ANA SER IF-ACNC: NEGATIVE

## 2021-06-01 PROBLEM — N63.0 BREAST MASS: Status: ACTIVE | Noted: 2018-10-08

## 2021-06-03 ENCOUNTER — APPOINTMENT (OUTPATIENT)
Dept: SURGERY | Facility: CLINIC | Age: 61
End: 2021-06-03
Payer: MEDICAID

## 2021-06-03 VITALS
HEIGHT: 67 IN | HEART RATE: 75 BPM | BODY MASS INDEX: 27.47 KG/M2 | SYSTOLIC BLOOD PRESSURE: 108 MMHG | DIASTOLIC BLOOD PRESSURE: 71 MMHG | WEIGHT: 175 LBS

## 2021-06-03 DIAGNOSIS — N63.0 UNSPECIFIED LUMP IN UNSPECIFIED BREAST: ICD-10-CM

## 2021-06-03 PROCEDURE — 99213 OFFICE O/P EST LOW 20 MIN: CPT

## 2021-06-03 NOTE — HISTORY OF PRESENT ILLNESS
[de-identified] : This is  a 61 year   old patient presenting today for a breast exam and to discuss the results of her recent  breast imaging. Patient had a short term left  breast follow up  US   on 05/17/2021. Deemed BIRADS category 3 and showed Stable probably benign finding left breast 1:00 for which continued short-term interval follow-up is recommended in 6 months.  Ms. BUENO denies any trauma and she has no nipple discharge. Patient denies any fever, night sweats or loss of appetite. There is no family history of breast carcinoma. Patient is on no hormonal replacement therapy. She is postmenopausal.  [de-identified] : Patient reports no interval changes to her overall health status or medical history \par she had COVID in March last year

## 2021-06-03 NOTE — PLAN
[FreeTextEntry1] : Ms. BUENO  is presenting  today for an evaluation .  she is doing well and offers no complaints.  Results of  her recent  imaging and physical examination findings were discussed in details.   She  was advised to have   Right     breast US and Mammogram in  November 2021 and return after the tests. Importance of monthly self-breast examination was reinforced.  Patient's questions and concerns addressed to patient's satisfaction.\par \par Vitamin E daily for breast pain \par Avoid caffein and Chocolates to prevent breast pain

## 2021-06-03 NOTE — DATA REVIEWED
[FreeTextEntry1] : PATIENT NAME: Kevin Higuera Gloria\par PATIENT ID: [id number]\par : 1960\par DATE OF EXAM: 2021\par INDICATION:\par Short-term interval follow-up of probably benign finding left breast 1:00 in a\par 61-year-old female.\par TECHNIQUE:\par Real-time ultrasound was performed using a high-frequency linear-array\par transducer. Static images are submitted for review.\par COMPARISON:\par Correlation is made with relevant prior imaging in PACS.\par FINDINGS:\par Left Breast:\par Oval hypoechoic parallel mass 1:00 3 cm from the nipple 6 x 8 x 3 mm, appearing\par more homogeneous and unchanged in size at this time.\par Cyst 3:00 5 cm from the nipple 4 x 1 x 3 mm.\par No new/additional finding left breast.\par IMPRESSION:\par Stable probably benign finding left breast 1:00 for which continued short-term\par interval follow-up is recommended in 6 months to document stability with left\par breast ultrasound.\par Recommendation: Left Limited ultrasound in 6 months.\par The patient is due for a bilateral mammogram in 2021.\par OVERALL ASSESSMENT -- BI-RADS 3 PROBABLY BENIGN\par The patient was sent a letter with the results of the exam and follow-up\par recommendation.\par R92.8\par END OF IMPRESSION\par Signed by: Soha Canales MD\par Signed Date: 2021 3:56 PM EDT

## 2021-06-03 NOTE — PHYSICAL EXAM
[Alert] : alert [Oriented to Person] : oriented to person [Oriented to Place] : oriented to place [Oriented to Time] : oriented to time [Calm] : calm [de-identified] : She  is alert, well-groomed, and cheerful.\par   [de-identified] : anicteric.  Nasal mucosa pink, septum midline. Oral mucosa pink.  Tongue midline, Pharynx without exudates.\par   [de-identified] : Neck supple. Trachea midline. Thyroid isthmus barely palpable, lobes not felt.\par   [de-identified] :  BL breast are symmetric. No masses; nipples without discharge. No palpable supraclavicular masses. Axillas are benign.

## 2021-10-01 ENCOUNTER — OUTPATIENT (OUTPATIENT)
Dept: OUTPATIENT SERVICES | Facility: HOSPITAL | Age: 61
LOS: 1 days | End: 2021-10-01
Payer: MEDICAID

## 2021-10-04 ENCOUNTER — EMERGENCY (EMERGENCY)
Facility: HOSPITAL | Age: 61
LOS: 1 days | Discharge: HOME | End: 2021-10-04
Attending: EMERGENCY MEDICINE
Payer: MEDICAID

## 2021-10-04 VITALS
SYSTOLIC BLOOD PRESSURE: 163 MMHG | TEMPERATURE: 99 F | OXYGEN SATURATION: 98 % | WEIGHT: 169.98 LBS | HEIGHT: 64 IN | RESPIRATION RATE: 19 BRPM | DIASTOLIC BLOOD PRESSURE: 92 MMHG | HEART RATE: 106 BPM

## 2021-10-04 LAB
ALBUMIN SERPL ELPH-MCNC: 4 G/DL — SIGNIFICANT CHANGE UP (ref 3.5–5)
ALP SERPL-CCNC: 101 U/L — SIGNIFICANT CHANGE UP (ref 40–120)
ALT FLD-CCNC: 30 U/L DA — SIGNIFICANT CHANGE UP (ref 10–60)
ANION GAP SERPL CALC-SCNC: 6 MMOL/L — SIGNIFICANT CHANGE UP (ref 5–17)
AST SERPL-CCNC: 21 U/L — SIGNIFICANT CHANGE UP (ref 10–40)
BASOPHILS # BLD AUTO: 0.04 K/UL — SIGNIFICANT CHANGE UP (ref 0–0.2)
BASOPHILS NFR BLD AUTO: 0.5 % — SIGNIFICANT CHANGE UP (ref 0–2)
BILIRUB SERPL-MCNC: 0.7 MG/DL — SIGNIFICANT CHANGE UP (ref 0.2–1.2)
BUN SERPL-MCNC: 14 MG/DL — SIGNIFICANT CHANGE UP (ref 7–18)
CALCIUM SERPL-MCNC: 9 MG/DL — SIGNIFICANT CHANGE UP (ref 8.4–10.5)
CHLORIDE SERPL-SCNC: 109 MMOL/L — HIGH (ref 96–108)
CK SERPL-CCNC: 138 U/L — SIGNIFICANT CHANGE UP (ref 21–215)
CK SERPL-CCNC: 149 U/L — SIGNIFICANT CHANGE UP (ref 21–215)
CO2 SERPL-SCNC: 27 MMOL/L — SIGNIFICANT CHANGE UP (ref 22–31)
CREAT SERPL-MCNC: 0.52 MG/DL — SIGNIFICANT CHANGE UP (ref 0.5–1.3)
EOSINOPHIL # BLD AUTO: 0.23 K/UL — SIGNIFICANT CHANGE UP (ref 0–0.5)
EOSINOPHIL NFR BLD AUTO: 2.7 % — SIGNIFICANT CHANGE UP (ref 0–6)
GLUCOSE SERPL-MCNC: 116 MG/DL — HIGH (ref 70–99)
HCT VFR BLD CALC: 37.6 % — SIGNIFICANT CHANGE UP (ref 34.5–45)
HGB BLD-MCNC: 12.2 G/DL — SIGNIFICANT CHANGE UP (ref 11.5–15.5)
IMM GRANULOCYTES NFR BLD AUTO: 0.4 % — SIGNIFICANT CHANGE UP (ref 0–1.5)
LYMPHOCYTES # BLD AUTO: 1.91 K/UL — SIGNIFICANT CHANGE UP (ref 1–3.3)
LYMPHOCYTES # BLD AUTO: 22.4 % — SIGNIFICANT CHANGE UP (ref 13–44)
MAGNESIUM SERPL-MCNC: 2.2 MG/DL — SIGNIFICANT CHANGE UP (ref 1.6–2.6)
MCHC RBC-ENTMCNC: 31.3 PG — SIGNIFICANT CHANGE UP (ref 27–34)
MCHC RBC-ENTMCNC: 32.4 GM/DL — SIGNIFICANT CHANGE UP (ref 32–36)
MCV RBC AUTO: 96.4 FL — SIGNIFICANT CHANGE UP (ref 80–100)
MONOCYTES # BLD AUTO: 0.44 K/UL — SIGNIFICANT CHANGE UP (ref 0–0.9)
MONOCYTES NFR BLD AUTO: 5.2 % — SIGNIFICANT CHANGE UP (ref 2–14)
NEUTROPHILS # BLD AUTO: 5.86 K/UL — SIGNIFICANT CHANGE UP (ref 1.8–7.4)
NEUTROPHILS NFR BLD AUTO: 68.8 % — SIGNIFICANT CHANGE UP (ref 43–77)
NRBC # BLD: 0 /100 WBCS — SIGNIFICANT CHANGE UP (ref 0–0)
PLATELET # BLD AUTO: 282 K/UL — SIGNIFICANT CHANGE UP (ref 150–400)
POTASSIUM SERPL-MCNC: 4.1 MMOL/L — SIGNIFICANT CHANGE UP (ref 3.5–5.3)
POTASSIUM SERPL-SCNC: 4.1 MMOL/L — SIGNIFICANT CHANGE UP (ref 3.5–5.3)
PROT SERPL-MCNC: 7.6 G/DL — SIGNIFICANT CHANGE UP (ref 6–8.3)
RBC # BLD: 3.9 M/UL — SIGNIFICANT CHANGE UP (ref 3.8–5.2)
RBC # FLD: 11.8 % — SIGNIFICANT CHANGE UP (ref 10.3–14.5)
SODIUM SERPL-SCNC: 142 MMOL/L — SIGNIFICANT CHANGE UP (ref 135–145)
T3 SERPL-MCNC: 100 NG/DL — SIGNIFICANT CHANGE UP (ref 80–200)
T4 AB SER-ACNC: 9.7 UG/DL — SIGNIFICANT CHANGE UP (ref 4.6–12)
TROPONIN I SERPL-MCNC: <0.015 NG/ML — SIGNIFICANT CHANGE UP (ref 0–0.04)
TROPONIN I SERPL-MCNC: <0.015 NG/ML — SIGNIFICANT CHANGE UP (ref 0–0.04)
TSH SERPL-MCNC: 1.32 UU/ML — SIGNIFICANT CHANGE UP (ref 0.34–4.82)
WBC # BLD: 8.51 K/UL — SIGNIFICANT CHANGE UP (ref 3.8–10.5)
WBC # FLD AUTO: 8.51 K/UL — SIGNIFICANT CHANGE UP (ref 3.8–10.5)

## 2021-10-04 PROCEDURE — 82550 ASSAY OF CK (CPK): CPT

## 2021-10-04 PROCEDURE — 71045 X-RAY EXAM CHEST 1 VIEW: CPT | Mod: 26

## 2021-10-04 PROCEDURE — 84436 ASSAY OF TOTAL THYROXINE: CPT

## 2021-10-04 PROCEDURE — 71045 X-RAY EXAM CHEST 1 VIEW: CPT

## 2021-10-04 PROCEDURE — 83735 ASSAY OF MAGNESIUM: CPT

## 2021-10-04 PROCEDURE — 99285 EMERGENCY DEPT VISIT HI MDM: CPT

## 2021-10-04 PROCEDURE — 93010 ELECTROCARDIOGRAM REPORT: CPT

## 2021-10-04 PROCEDURE — 99283 EMERGENCY DEPT VISIT LOW MDM: CPT | Mod: 25

## 2021-10-04 PROCEDURE — 36415 COLL VENOUS BLD VENIPUNCTURE: CPT

## 2021-10-04 PROCEDURE — 80053 COMPREHEN METABOLIC PANEL: CPT

## 2021-10-04 PROCEDURE — 84484 ASSAY OF TROPONIN QUANT: CPT

## 2021-10-04 PROCEDURE — 93005 ELECTROCARDIOGRAM TRACING: CPT

## 2021-10-04 PROCEDURE — 84443 ASSAY THYROID STIM HORMONE: CPT

## 2021-10-04 PROCEDURE — 82962 GLUCOSE BLOOD TEST: CPT

## 2021-10-04 PROCEDURE — 84480 ASSAY TRIIODOTHYRONINE (T3): CPT

## 2021-10-04 PROCEDURE — 85025 COMPLETE CBC W/AUTO DIFF WBC: CPT

## 2021-10-04 RX ORDER — SODIUM CHLORIDE 9 MG/ML
1000 INJECTION INTRAMUSCULAR; INTRAVENOUS; SUBCUTANEOUS ONCE
Refills: 0 | Status: COMPLETED | OUTPATIENT
Start: 2021-10-04 | End: 2021-10-04

## 2021-10-04 RX ADMIN — SODIUM CHLORIDE 1000 MILLILITER(S): 9 INJECTION INTRAMUSCULAR; INTRAVENOUS; SUBCUTANEOUS at 18:30

## 2021-10-04 NOTE — ED PROVIDER NOTE - CLINICAL SUMMARY MEDICAL DECISION MAKING FREE TEXT BOX
62 y/o female c/o palpitations with resultant headache, well appearing, will do labs and reassess. 60 y/o female c/o palpitations with resultant headache, well appearing, will do labs and reassess.  troponin negative x2, patient feeling well, EKG unchanged from prior, home with own cardiology followup

## 2021-10-04 NOTE — ED PROVIDER NOTE - NS ED MD DISPO DISCHARGE
patient stated that he passed out and his friend called the ambulance and as per paramedic his blood sugar was 20, pt reports to be lightheaded
Home

## 2021-10-04 NOTE — ED ADULT NURSE NOTE - OBJECTIVE STATEMENT
presents to the ER c/o on and off palpitations x 2 days states get dizzy  and feeling weak  when getting palpitations   denies chest pain/sob

## 2021-10-04 NOTE — ED PROVIDER NOTE - PATIENT PORTAL LINK FT
You can access the FollowMyHealth Patient Portal offered by [facility] by registering at the following website: [email or web address]. By joining [facility]’s FollowMyHealth portal, you will also be able to view your health information using other applications (apps) compatible with our system.

## 2021-10-04 NOTE — ED PROVIDER NOTE - OBJECTIVE STATEMENT
62 y/o female with PMHx thyroid problems, HTN presents to ED c/o palpitations. Patient states palpitations since yesterday. Patient notes during episodes of palpitations described as rapidly beating heart feels urge to move bowels and lasts several minutes before resolving. Patient notes feels lightheaded as well during episodes with associated headache. Patient notes went to cardiologist in past for same symptoms who said everything was okay. Patient states also feels achy overall during palpitations.

## 2021-10-05 VITALS
RESPIRATION RATE: 18 BRPM | HEART RATE: 62 BPM | SYSTOLIC BLOOD PRESSURE: 130 MMHG | DIASTOLIC BLOOD PRESSURE: 81 MMHG | OXYGEN SATURATION: 99 % | TEMPERATURE: 98 F

## 2021-10-06 ENCOUNTER — INPATIENT (INPATIENT)
Facility: HOSPITAL | Age: 61
LOS: 0 days | Discharge: HOME HEALTH | End: 2021-10-07
Attending: INTERNAL MEDICINE | Admitting: INTERNAL MEDICINE
Payer: MEDICAID

## 2021-10-06 VITALS
TEMPERATURE: 98 F | SYSTOLIC BLOOD PRESSURE: 139 MMHG | HEART RATE: 71 BPM | RESPIRATION RATE: 18 BRPM | OXYGEN SATURATION: 100 % | HEIGHT: 64 IN | DIASTOLIC BLOOD PRESSURE: 59 MMHG

## 2021-10-06 DIAGNOSIS — E03.9 HYPOTHYROIDISM, UNSPECIFIED: ICD-10-CM

## 2021-10-06 DIAGNOSIS — R51.9 HEADACHE, UNSPECIFIED: ICD-10-CM

## 2021-10-06 DIAGNOSIS — Z90.49 ACQUIRED ABSENCE OF OTHER SPECIFIED PARTS OF DIGESTIVE TRACT: Chronic | ICD-10-CM

## 2021-10-06 DIAGNOSIS — R00.2 PALPITATIONS: ICD-10-CM

## 2021-10-06 DIAGNOSIS — Z29.9 ENCOUNTER FOR PROPHYLACTIC MEASURES, UNSPECIFIED: ICD-10-CM

## 2021-10-06 DIAGNOSIS — I10 ESSENTIAL (PRIMARY) HYPERTENSION: ICD-10-CM

## 2021-10-06 DIAGNOSIS — R10.9 UNSPECIFIED ABDOMINAL PAIN: ICD-10-CM

## 2021-10-06 LAB
ALBUMIN SERPL ELPH-MCNC: 4.8 G/DL — SIGNIFICANT CHANGE UP (ref 3.3–5)
ALP SERPL-CCNC: 103 U/L — SIGNIFICANT CHANGE UP (ref 40–120)
ALT FLD-CCNC: 20 U/L — SIGNIFICANT CHANGE UP (ref 4–33)
ANION GAP SERPL CALC-SCNC: 13 MMOL/L — SIGNIFICANT CHANGE UP (ref 7–14)
APPEARANCE UR: CLEAR — SIGNIFICANT CHANGE UP
AST SERPL-CCNC: 21 U/L — SIGNIFICANT CHANGE UP (ref 4–32)
BASOPHILS # BLD AUTO: 0.04 K/UL — SIGNIFICANT CHANGE UP (ref 0–0.2)
BASOPHILS NFR BLD AUTO: 0.6 % — SIGNIFICANT CHANGE UP (ref 0–2)
BILIRUB SERPL-MCNC: 0.9 MG/DL — SIGNIFICANT CHANGE UP (ref 0.2–1.2)
BILIRUB UR-MCNC: NEGATIVE — SIGNIFICANT CHANGE UP
BUN SERPL-MCNC: 8 MG/DL — SIGNIFICANT CHANGE UP (ref 7–23)
CALCIUM SERPL-MCNC: 9.4 MG/DL — SIGNIFICANT CHANGE UP (ref 8.4–10.5)
CHLORIDE SERPL-SCNC: 103 MMOL/L — SIGNIFICANT CHANGE UP (ref 98–107)
CO2 SERPL-SCNC: 25 MMOL/L — SIGNIFICANT CHANGE UP (ref 22–31)
COLOR SPEC: SIGNIFICANT CHANGE UP
CREAT SERPL-MCNC: 0.45 MG/DL — LOW (ref 0.5–1.3)
DIFF PNL FLD: NEGATIVE — SIGNIFICANT CHANGE UP
EOSINOPHIL # BLD AUTO: 0.2 K/UL — SIGNIFICANT CHANGE UP (ref 0–0.5)
EOSINOPHIL NFR BLD AUTO: 2.9 % — SIGNIFICANT CHANGE UP (ref 0–6)
GLUCOSE SERPL-MCNC: 106 MG/DL — HIGH (ref 70–99)
GLUCOSE UR QL: NEGATIVE — SIGNIFICANT CHANGE UP
HCT VFR BLD CALC: 38 % — SIGNIFICANT CHANGE UP (ref 34.5–45)
HGB BLD-MCNC: 12.6 G/DL — SIGNIFICANT CHANGE UP (ref 11.5–15.5)
IANC: 4 K/UL — SIGNIFICANT CHANGE UP (ref 1.5–8.5)
IMM GRANULOCYTES NFR BLD AUTO: 0.1 % — SIGNIFICANT CHANGE UP (ref 0–1.5)
KETONES UR-MCNC: ABNORMAL
LEUKOCYTE ESTERASE UR-ACNC: NEGATIVE — SIGNIFICANT CHANGE UP
LIDOCAIN IGE QN: 32 U/L — SIGNIFICANT CHANGE UP (ref 7–60)
LYMPHOCYTES # BLD AUTO: 2.19 K/UL — SIGNIFICANT CHANGE UP (ref 1–3.3)
LYMPHOCYTES # BLD AUTO: 31.7 % — SIGNIFICANT CHANGE UP (ref 13–44)
MCHC RBC-ENTMCNC: 31.9 PG — SIGNIFICANT CHANGE UP (ref 27–34)
MCHC RBC-ENTMCNC: 33.2 GM/DL — SIGNIFICANT CHANGE UP (ref 32–36)
MCV RBC AUTO: 96.2 FL — SIGNIFICANT CHANGE UP (ref 80–100)
MONOCYTES # BLD AUTO: 0.47 K/UL — SIGNIFICANT CHANGE UP (ref 0–0.9)
MONOCYTES NFR BLD AUTO: 6.8 % — SIGNIFICANT CHANGE UP (ref 2–14)
NEUTROPHILS # BLD AUTO: 4 K/UL — SIGNIFICANT CHANGE UP (ref 1.8–7.4)
NEUTROPHILS NFR BLD AUTO: 57.9 % — SIGNIFICANT CHANGE UP (ref 43–77)
NITRITE UR-MCNC: NEGATIVE — SIGNIFICANT CHANGE UP
NRBC # BLD: 0 /100 WBCS — SIGNIFICANT CHANGE UP
NRBC # FLD: 0 K/UL — SIGNIFICANT CHANGE UP
PH UR: 6.5 — SIGNIFICANT CHANGE UP (ref 5–8)
PLATELET # BLD AUTO: 272 K/UL — SIGNIFICANT CHANGE UP (ref 150–400)
POTASSIUM SERPL-MCNC: 3.8 MMOL/L — SIGNIFICANT CHANGE UP (ref 3.5–5.3)
POTASSIUM SERPL-SCNC: 3.8 MMOL/L — SIGNIFICANT CHANGE UP (ref 3.5–5.3)
PROT SERPL-MCNC: 7.8 G/DL — SIGNIFICANT CHANGE UP (ref 6–8.3)
PROT UR-MCNC: NEGATIVE — SIGNIFICANT CHANGE UP
RBC # BLD: 3.95 M/UL — SIGNIFICANT CHANGE UP (ref 3.8–5.2)
RBC # FLD: 11.8 % — SIGNIFICANT CHANGE UP (ref 10.3–14.5)
SARS-COV-2 RNA SPEC QL NAA+PROBE: SIGNIFICANT CHANGE UP
SODIUM SERPL-SCNC: 141 MMOL/L — SIGNIFICANT CHANGE UP (ref 135–145)
SP GR SPEC: 1.01 — SIGNIFICANT CHANGE UP (ref 1–1.05)
TROPONIN T, HIGH SENSITIVITY RESULT: 7 NG/L — SIGNIFICANT CHANGE UP
UROBILINOGEN FLD QL: SIGNIFICANT CHANGE UP
WBC # BLD: 6.91 K/UL — SIGNIFICANT CHANGE UP (ref 3.8–10.5)
WBC # FLD AUTO: 6.91 K/UL — SIGNIFICANT CHANGE UP (ref 3.8–10.5)

## 2021-10-06 PROCEDURE — 99223 1ST HOSP IP/OBS HIGH 75: CPT

## 2021-10-06 PROCEDURE — 71046 X-RAY EXAM CHEST 2 VIEWS: CPT | Mod: 26

## 2021-10-06 PROCEDURE — 70450 CT HEAD/BRAIN W/O DYE: CPT | Mod: 26

## 2021-10-06 PROCEDURE — 99285 EMERGENCY DEPT VISIT HI MDM: CPT | Mod: 25

## 2021-10-06 PROCEDURE — 93010 ELECTROCARDIOGRAM REPORT: CPT

## 2021-10-06 RX ORDER — LEVOTHYROXINE SODIUM 125 MCG
25 TABLET ORAL DAILY
Refills: 0 | Status: DISCONTINUED | OUTPATIENT
Start: 2021-10-07 | End: 2021-10-07

## 2021-10-06 RX ORDER — PANTOPRAZOLE SODIUM 20 MG/1
40 TABLET, DELAYED RELEASE ORAL
Refills: 0 | Status: DISCONTINUED | OUTPATIENT
Start: 2021-10-06 | End: 2021-10-07

## 2021-10-06 RX ORDER — ENOXAPARIN SODIUM 100 MG/ML
40 INJECTION SUBCUTANEOUS DAILY
Refills: 0 | Status: DISCONTINUED | OUTPATIENT
Start: 2021-10-06 | End: 2021-10-07

## 2021-10-06 RX ORDER — LOSARTAN POTASSIUM 100 MG/1
50 TABLET, FILM COATED ORAL DAILY
Refills: 0 | Status: DISCONTINUED | OUTPATIENT
Start: 2021-10-07 | End: 2021-10-07

## 2021-10-06 NOTE — ED PROVIDER NOTE - PROGRESS NOTE DETAILS
FRANCO Salazar: [facility] with [facility], stat may admit to Dr. Cowart FRANCO Salazar: Left a VM for Dr. Cowart  FRANCO Salazar: Spoke with Dr. Cowart, may admit to him once CTH is resulted. FRANCO Silvestre: Pt signed out to me by FRANCO Salazar pending CT head results, to be admitted to  who already accepted this admission FRANCO Silvestre: CT head without acute findings, admission placed, pt aware, text paged MAR

## 2021-10-06 NOTE — H&P ADULT - PROBLEM SELECTOR PLAN 1
On and off palpitations for 5 months worsened over last week. Associated CP afterwards. ACS ruled out on prior ED visit 10/4, initial troponin here negative. EKG nonischemic  Pt reports normal echo, stress test, holter (4 days) with Dr. Ch in July  Unclear if this is truly cardiac related but patient complaining of persistent palpitations with elevated HR according to home heart monitor. Also with episode of presyncope today  -monitor on tele  -cardiology consult in AM regarding possible longer term monitoring such as ILR  -TTE ordered  -obtain collateral info regarding stress test and holter in past  -check orthostatics

## 2021-10-06 NOTE — ED PROVIDER NOTE - CLINICAL SUMMARY MEDICAL DECISION MAKING FREE TEXT BOX
The patient is a 61y Female, PMHx hypothyroidism and HTN complaining of multiple medical complaints. 4 days ago, patient reports experiencing palpitations, followed by the urge to move her bowels. Associated with an intermittent headache, described as a tight band around the temporal region of her head, and subjective chills and weakness. Today, patient describes a near-syncopal episode upon standing up from bed, where she reports her vision went black and she felt lightheaded, and symptoms resolved after sitting down for a few minutes.  Plan: labs (CBC, CMP, troponin), U/A & cx, EKG, cardiac monitor, CXR, CT head, consult cardiology.

## 2021-10-06 NOTE — ED ADULT TRIAGE NOTE - CHIEF COMPLAINT QUOTE
c/o blurry vision for the past 3 days, generalized weakness, loose stools. pt denies fever, chills, sent in form Cardiologist office for further work up. Hx of HTN and thyroid disease. PERRLA extremities are strong and equal.

## 2021-10-06 NOTE — ED PROVIDER NOTE - ATTENDING CONTRIBUTION TO CARE
I performed a face-to-face evaluation of the patient and performed a history and physical examination. I agree with the history and physical examination.    H/o palpitations. Recent stress, echo, Holter neg. Near-syncope today. Sent to ED by her Cards. Check trop, EKG, TSH, CXR. Not likely PNA: no infectious Sx (eg, purulent cough). Not likely PE or other VTE: PERC or Wells low score, EKG no e/o R-heart strain. Likely admit.

## 2021-10-06 NOTE — ED PROVIDER NOTE - OBJECTIVE STATEMENT
The patient is a 61y Female, PMHx hypothyroidism and HTN complaining of multiple medical complaints. Patient reports experiencing heart palpitations and blurry vision since her COVID infection in March of 2021. Patient reports seeing an ophthalmologist for blurred vision, described as decreased visual acuity in the center of her visual fields, and was told her eye exam was benign. Palpitations were worked up by her cardiologist - reports stress test, echocardiogram, and holter monitor were all benign in July. 4 days ago, patient reports the palpitations were then followed by the urge to move bowels - describes feeling her heart beating rapidly, which lasts several minutes before resolving. Associated with an intermittent headache, described as a tight band around the temporal region of her head, which is relieved with Tylenol, and subjective chills and weakness. Patient was seen 2 days ago at [facility], and was discharged with instructions to follow up with her cardiologist. Today, patient describes a near-syncopal episode upon standing up from bed, where she reports her vision went black and she felt lightheaded, and symptoms resolved after sitting down for a few minutes. At her cardiologist appointment this afternoon, patient was advised to return to the ED for evaluation. Denies any fevers, chest pain, leg pain or swelling, LOC, head trauma, N/V/D/C, black or bloody stools, urinary symptoms, or any other complaints.

## 2021-10-06 NOTE — ED PROVIDER NOTE - NSICDXPASTMEDICALHX_GEN_ALL_CORE_FT
PAST MEDICAL HISTORY:  Episodic tension-type headache, not intractable     HTN (hypertension)     Hypothyroidism

## 2021-10-06 NOTE — ED ADULT NURSE NOTE - BEFAST SPEECH SLURRED
What Type Of Note Output Would You Prefer (Optional)?: Standard Output How Severe Are Your Spot(S)?: mild Have Your Spot(S) Been Treated In The Past?: has not been treated Hpi Title: Evaluation of a Skin Lesion No

## 2021-10-06 NOTE — ED ADULT NURSE NOTE - OBJECTIVE STATEMENT
Pt arriving to intake room 3 A&OX4 ambulatory c/o intermittent palpitations x 3 days. Pt endorsing 1 episode of nausea without vomiting, generalized weakness, diarrhea worse in the AM. Pt states she had near syncopal episode yesterday, was able to sit in chair. Denies LOC, head trauma. Pt denies any physical complaints at this time. Resp even and unlabored. VS as charted. IV access pending. Pt to be admitted. Pt arriving to intake room 3 A&OX4 ambulatory c/o intermittent palpitations x 3 days. Pt endorsing 1 episode of nausea without vomiting, generalized weakness, diarrhea worse in the AM. Pt states she had near syncopal episode yesterday, was able to sit in chair. Denies LOC, head trauma. Pt denies any physical complaints at this time. Resp even and unlabored. VS as charted. 20g IV placed in RAC. Labs drawn and sent. Pt to be admitted.

## 2021-10-06 NOTE — H&P ADULT - PROBLEM SELECTOR PLAN 2
Hx of gastritis and this may be causing abdominal discomfort. Has 8lb weight loss as well  May be precipitant of palpitations? though overall unlikely  -patient has GI f/u one week ago and would benefit from repeat EGD to assess for malignancy vs gastritis  -restart pantoprazole  -maalox  -H pylori antigen Hx of gastritis and this may be causing abdominal discomfort. Has 8lb weight loss as well  May be precipitant of palpitations? though overall unlikely  -patient has GI f/u one week ago and would benefit from repeat EGD to assess for malignancy vs gastritis  -restart pantoprazole  -maalox  -H pylori antigen  -US abdomen ordered

## 2021-10-06 NOTE — H&P ADULT - HISTORY OF PRESENT ILLNESS
61F with PMHx HTN, hypothyroidism, COVID 3/2021 presenting with palpitations for one week. Pt notes beginning of palpitations in May and then had follow up with cardiology with normal holter, stress test and echo in July. She follows with Dr. Ch (cardiology). Palpitations are sudden in onset, not associated with activity, resolves on its own with rest and lasts about a few minutes at a time. After palpitation episodes she sometimes has burning chest pain that self resolves on its own. Not exertional. The last time she had palpitations were in August which resolved on its own. However, over the last week the patient has had palpitations about 4x a day. 2 days ago she was seen at [place] for these complaints and discharged from ER with normal troponins and close f/u with cardiology. Pt saw cardiologist today who sent patient into ER for further evaluation. Note in paper chart states echo and stress within year was normal. Pt also had an episode of lightheadedness, near syncope, HA, sweatiness and chills this AM when getting up from bed. This was associated with the palpitations that eventually self resolved. Of note, pt feels as though during holter she had a lower level of palpitations during the 4 days and not as severe as usual. She also has intermittent blurry vision with episodes and saw an ophthalmologist a recently and was told her eye exam is normal. She is due to see again in one week. In addition, patient has upper abdominal discomfort, decreased appetite, bloating/gas type discomfort with PO intake. This has caused decreased PO overall but denies actual localized abdominal pain. Denies bloody BMs, melena. She has had 8lb weight loss over the last month. She believes she had an abdominal US several weeks ago but does not know the result and is supposed to see GI next week. She had an EGD 3 years ago and was told of gastritis and was placed on pantoprazole but stopped this year. She had a C-scope at that time that was normal as well. She believes she was treated for H pylori 10 years ago as well. Denies FHx of stomach, colon cancer.

## 2021-10-06 NOTE — H&P ADULT - NSHPPHYSICALEXAM_GEN_ALL_CORE
PHYSICAL EXAM:  Vital Signs Last 24 Hrs  T(C): 36.7 (10-06-21 @ 22:04)  T(F): 98.1 (10-06-21 @ 22:04), Max: 98.2 (10-06-21 @ 12:57)  HR: 56 (10-06-21 @ 22:04) (56 - 71)  BP: 118/69 (10-06-21 @ 22:04)  BP(mean): --  RR: 17 (10-06-21 @ 22:04) (17 - 18)  SpO2: 100% (10-06-21 @ 22:04) (100% - 100%)  Wt(kg): --    Constitutional: NAD, awake and alert, well developed  EYES: EOMI, conjunctiva clear  ENT:  Normal Hearing, no tonsillar exudates   Neck: Soft and supple , no thyromegaly   Respiratory: Breath sounds are clear bilaterally, No wheezing, rales or rhonchi, no tachypnea, no accessory muscle use  Cardiovascular: S1 and S2, regular rate and rhythm, no Murmurs, gallops or rubs, no JVD, no leg edema  Gastrointestinal: Bowel Sounds present, soft, nontender, nondistended, no guarding, no rebound  Extremities: No cyanosis or clubbing; warm to touch  Vascular: 2+ peripheral pulses lower ex  Neurological: No focal deficits, CN II-XII intact bilaterally, sensation to light touch intact in all extremities. Pupils are equally reactive to light and symmetrical in size.   Musculoskeletal: 5/5 strength b/l upper and lower extremities; no joint swelling.  Skin: No rashes, no ulcerations

## 2021-10-06 NOTE — H&P ADULT - NSHPREVIEWOFSYSTEMS_GEN_ALL_CORE
ROS:    Constitutional: [ ] fevers [x ] weight loss   HEENT: [ ] dry eyes [ ] eye irritation [x] blurry vision  CV: [x ] chest pain [ ] orthopnea [ x] palpitations   Resp: [ ] cough [ ] shortness of breath [ ] dyspnea [ ] wheezing   GI: [ ] nausea [ ] vomiting [ ] diarrhea [ ] constipation [x ] abd discomfort   : [ ] dysuria [ ] nocturia [ ] hematuria [ ] increased urinary frequency  Musculoskeletal: [ ] back pain [ ] myalgias [ ] arthralgias   Skin: [ ] rash [ ] itch  Neurological: [x ] headache [ ] dizziness [ ] syncope [ ] weakness   Endocrine: [ ] diabetes [ ] thyroid problem  Hematologic/Lymphatic: [ ] anemia [ ] bleeding problem  Allergic/Immunologic: [ ] itchy eyes [ ] nasal discharge   [x ] All other systems negative

## 2021-10-06 NOTE — H&P ADULT - NSHPLABSRESULTS_GEN_ALL_CORE
I have personally reviewed this patient's labs below:                        12.6   6.91  )-----------( 272      ( 06 Oct 2021 17:52 )             38.0     10-06-21 @ 17:52    141  |  103  |  8             --------------------------< 106<H>     3.8  |  25  | 0.45<L>    eGFR AA: 125  eGFR N-AA: 108    Calcium: 9.4  Phosphorus: --  Magnesium: --    AST: 21    ALT: 20  AlkPhos: 103  Protein: 7.8  Albumin: 4.8  TBili: 0.9  D-Bili: --    Total T3 100, T4 9.7, TSH 1.32 - normal 10/4  Troponin I negative x2 10/4      I have personally reviewed this patient's EKG and my independent interpretation is NSR @60bpm, no ST depressions or elevations    I have personally reviewed this patient's CXR and my independent interpretation is clear lungs    Imaging reviewed below:  CTH noncontrast  EXAM:  CT BRAIN        PROCEDURE DATE:  Oct  6 2021         INTERPRETATION:  CT HEAD  HEAD CT    INDICATIONS: Syncope, simple, normal neuro exam, PMHx hypothyroidism and HTN complaining of multiple medical complaints. Patient reports experiencing heart palpitations and blurry vision since her COVID infection in March of 2021. 4 days ago, patient reports the palpitations were then followed by the urge to move bowels - describes feeling her heart beating rapidly, which lasts several minutes before resolving. Associated with an intermittent headache, described as a tight band around the temporal region of her head, which is relieved with Tylenol, and subjective chills and weakness. Patient was seen 2 days ago at  [facility], and was discharged with instructions to follow up with her cardiologist. Today, patient describes a near-syncopal episode upon standing up from bed, where she reports her vision went black and she felt lightheaded, and symptoms resolved after sitting down for a few minutes. At her cardiologist appointment this afternoon, patient was advised to return to the ED for evaluation.    TECHNIQUE:    HEAD CT:  Serial axial images were obtained from the skull base to the vertex without the use of intravenous contrast.    COMPARISON EXAMINATION: Head CT 8/2/2020    FINDINGS:    HEAD CT:    VENTRICLES AND SULCI: Ventricles and sulci are unremarkable for patient age.  INTRA-AXIAL: No intracranial mass, acute hemorrhage, or midline shift is present.  EXTRA-AXIAL: No extra-axial fluid collection is present.  INTRACRANIAL HEMORRHAGE: None.    VISUALIZED SINUSES: No air-fluid levels are identified.  VISUALIZED MASTOIDS:  Clear.  CALVARIUM:  Intact.  MISCELLANEOUS:  None.    SOFT TISSUES: Unremarkable.  BONES: Unremarkable.      IMPRESSION:    HEAD CT: No acute hemorrhage or midline shift.

## 2021-10-06 NOTE — ED PROVIDER NOTE - CARE PLAN
1 Principal Discharge DX:	Headache   Principal Discharge DX:	Headache  Secondary Diagnosis:	Palpitations

## 2021-10-07 ENCOUNTER — TRANSCRIPTION ENCOUNTER (OUTPATIENT)
Age: 61
End: 2021-10-07

## 2021-10-07 VITALS
OXYGEN SATURATION: 100 % | SYSTOLIC BLOOD PRESSURE: 114 MMHG | DIASTOLIC BLOOD PRESSURE: 75 MMHG | RESPIRATION RATE: 18 BRPM | HEART RATE: 60 BPM | TEMPERATURE: 98 F

## 2021-10-07 LAB
ALBUMIN SERPL ELPH-MCNC: 4.4 G/DL — SIGNIFICANT CHANGE UP (ref 3.3–5)
ALP SERPL-CCNC: 98 U/L — SIGNIFICANT CHANGE UP (ref 40–120)
ALT FLD-CCNC: 22 U/L — SIGNIFICANT CHANGE UP (ref 4–33)
ANION GAP SERPL CALC-SCNC: 13 MMOL/L — SIGNIFICANT CHANGE UP (ref 7–14)
AST SERPL-CCNC: 22 U/L — SIGNIFICANT CHANGE UP (ref 4–32)
BILIRUB SERPL-MCNC: 1.2 MG/DL — SIGNIFICANT CHANGE UP (ref 0.2–1.2)
BUN SERPL-MCNC: 7 MG/DL — SIGNIFICANT CHANGE UP (ref 7–23)
CALCIUM SERPL-MCNC: 9.4 MG/DL — SIGNIFICANT CHANGE UP (ref 8.4–10.5)
CHLORIDE SERPL-SCNC: 103 MMOL/L — SIGNIFICANT CHANGE UP (ref 98–107)
CO2 SERPL-SCNC: 23 MMOL/L — SIGNIFICANT CHANGE UP (ref 22–31)
CREAT SERPL-MCNC: 0.46 MG/DL — LOW (ref 0.5–1.3)
CULTURE RESULTS: SIGNIFICANT CHANGE UP
GLUCOSE BLDC GLUCOMTR-MCNC: 98 MG/DL — SIGNIFICANT CHANGE UP (ref 70–99)
GLUCOSE SERPL-MCNC: 101 MG/DL — HIGH (ref 70–99)
HCT VFR BLD CALC: 37.9 % — SIGNIFICANT CHANGE UP (ref 34.5–45)
HCV AB S/CO SERPL IA: 0.17 S/CO — SIGNIFICANT CHANGE UP (ref 0–0.99)
HCV AB SERPL-IMP: SIGNIFICANT CHANGE UP
HGB BLD-MCNC: 12.4 G/DL — SIGNIFICANT CHANGE UP (ref 11.5–15.5)
MAGNESIUM SERPL-MCNC: 2.3 MG/DL — SIGNIFICANT CHANGE UP (ref 1.6–2.6)
MCHC RBC-ENTMCNC: 31.7 PG — SIGNIFICANT CHANGE UP (ref 27–34)
MCHC RBC-ENTMCNC: 32.7 GM/DL — SIGNIFICANT CHANGE UP (ref 32–36)
MCV RBC AUTO: 96.9 FL — SIGNIFICANT CHANGE UP (ref 80–100)
NRBC # BLD: 0 /100 WBCS — SIGNIFICANT CHANGE UP
NRBC # FLD: 0 K/UL — SIGNIFICANT CHANGE UP
PHOSPHATE SERPL-MCNC: 3.2 MG/DL — SIGNIFICANT CHANGE UP (ref 2.5–4.5)
PLATELET # BLD AUTO: 256 K/UL — SIGNIFICANT CHANGE UP (ref 150–400)
POTASSIUM SERPL-MCNC: 4 MMOL/L — SIGNIFICANT CHANGE UP (ref 3.5–5.3)
POTASSIUM SERPL-SCNC: 4 MMOL/L — SIGNIFICANT CHANGE UP (ref 3.5–5.3)
PROT SERPL-MCNC: 7.3 G/DL — SIGNIFICANT CHANGE UP (ref 6–8.3)
RBC # BLD: 3.91 M/UL — SIGNIFICANT CHANGE UP (ref 3.8–5.2)
RBC # FLD: 11.8 % — SIGNIFICANT CHANGE UP (ref 10.3–14.5)
SODIUM SERPL-SCNC: 139 MMOL/L — SIGNIFICANT CHANGE UP (ref 135–145)
SPECIMEN SOURCE: SIGNIFICANT CHANGE UP
WBC # BLD: 6.44 K/UL — SIGNIFICANT CHANGE UP (ref 3.8–10.5)
WBC # FLD AUTO: 6.44 K/UL — SIGNIFICANT CHANGE UP (ref 3.8–10.5)

## 2021-10-07 PROCEDURE — 76700 US EXAM ABDOM COMPLETE: CPT | Mod: 26

## 2021-10-07 RX ORDER — PANTOPRAZOLE SODIUM 20 MG/1
1 TABLET, DELAYED RELEASE ORAL
Qty: 14 | Refills: 0
Start: 2021-10-07 | End: 2021-10-20

## 2021-10-07 RX ADMIN — PANTOPRAZOLE SODIUM 40 MILLIGRAM(S): 20 TABLET, DELAYED RELEASE ORAL at 07:36

## 2021-10-07 RX ADMIN — LOSARTAN POTASSIUM 50 MILLIGRAM(S): 100 TABLET, FILM COATED ORAL at 12:00

## 2021-10-07 RX ADMIN — Medication 25 MICROGRAM(S): at 05:42

## 2021-10-07 RX ADMIN — ENOXAPARIN SODIUM 40 MILLIGRAM(S): 100 INJECTION SUBCUTANEOUS at 12:00

## 2021-10-07 NOTE — PROVIDER CONTACT NOTE (OTHER) - ASSESSMENT
SW met with pt at bedside to confirm address as: [address] and pt has money to pay for cab. SW contacted [facility] ([phone]) who will complete trip. No further SW needs at this time.
Patient asymptomatic at this time, vitals as noted in flowsheet. Upon waking up, HR increased to 55.

## 2021-10-07 NOTE — DISCHARGE NOTE PROVIDER - HOSPITAL COURSE
61F with PMHx HTN, hypothyroidism, COVID 3/2021 presenting with palpitations for one week.    palpitations  - on and off palpitations for 5 months worsened over last week  - ACS ruled out on prior ED visit 10/4  - initial troponin 7  - EKG nonischemic  - reports normal echo, stress test, holter (4 days) with Dr. Ch in July  - unclear if this is truly cardiac related but patient complaining of persistent palpitations with elevated HR according to home heart monitor. Also with episode of presyncope today  - telemetry   - cardio Dr Charlton for possible longer term monitoring such as ILR  - TTE   - obtain collateral info regarding stress test and holter in past  - orthostatics    abdominal discomfort  - hx of gastritis and this may be causing abdominal discomfort  - has 8lb weight loss as well  - may be precipitant of palpitations? though overall unlikely  - patient has GI f/u one week ago and would benefit from repeat EGD to assess for malignancy vs gastritis  - PPI  - maalox  - H pylori antigen    HTN  - losartan     hypothyroidism  - synthroid  - TSH WNL       61F with PMHx HTN, hypothyroidism, COVID 3/2021 presenting with palpitations for one week.    palpitations  - on and off palpitations for 5 months worsened over last week  - ACS ruled out on prior ED visit 10/4  - initial troponin 7  - EKG nonischemic  - reports normal echo, stress test, holter (4 days) with Dr. Bridges in July  - unclear if this is truly cardiac related but patient complaining of persistent palpitations with elevated HR according to home heart monitor. Also with episode of presyncope today  - telemetry   - cardio Dr Charlton for possible longer term monitoring such as ILR - to be done as outpatient     abdominal discomfort  - hx of gastritis and this may be causing abdominal discomfort  - has 8lb weight loss as well  - may be precipitant of palpitations? though overall unlikely  - patient has GI f/u one week ago and would benefit from repeat EGD to assess for malignancy vs gastritis  - PPI  - maalox  - US abdomen - Fatty liver. Cholecystectomy with postcholecystectomy biliary ductal dilatation.  - patient has an appointment with GI on Oct 13 with Dr De Paz    HTN  - losartan     hypothyroidism  - synthroid  - TSH WNL      This case was reviewed with Dr. Cowart. The patient is medically stable and optimized for discharge. All medications were reviewed and prescriptions were sent to [facility].

## 2021-10-07 NOTE — PROVIDER CONTACT NOTE (OTHER) - BACKGROUND
Per ACP, pt is cleared for d/c and needs a taxi to return home.
Patient admitted for near syncopal episode.

## 2021-10-07 NOTE — DISCHARGE NOTE PROVIDER - CARE PROVIDER_API CALL
Manuel De Paz)  Gastroenterology  [address] [place] [address]  Phone: [phone]  Fax: [phone]  Follow Up Time:     Aime Goff)  Cardiology  [address] [place] [address], [address]  [place] [address]  Phone: [phone]  Fax: [phone]  Follow Up Time:

## 2021-10-07 NOTE — DISCHARGE NOTE PROVIDER - NSDCMRMEDTOKEN_GEN_ALL_CORE_FT
losartan 50 mg oral tablet: 1 tab(s) orally once a day  Synthroid 25 mcg (0.025 mg) oral tablet: 1 tab(s) orally once a day   losartan 50 mg oral tablet: 1 tab(s) orally once a day  pantoprazole 40 mg oral delayed release tablet: 1 tab(s) orally once a day (before a meal)  Synthroid 25 mcg (0.025 mg) oral tablet: 1 tab(s) orally once a day

## 2021-10-07 NOTE — CONSULT NOTE ADULT - ATTENDING COMMENTS
seen and agree w/ PA.  palpitations and dizziness ongoing chronically, defied diagnosis with ambulatory monitoring. reassuring noninvasive CV workup.  consider outpatient loop recorder for symptom-rhythm correlation.

## 2021-10-07 NOTE — CONSULT NOTE ADULT - SUBJECTIVE AND OBJECTIVE BOX
HISTORY OF PRESENT ILLNESS: HPI:'    61F with PMHx HTN, hypothyroidism, COVID 3/2021 presenting with palpitations for one week. Pt notes beginning of palpitations in May and then had follow up with cardiology with normal holter, stress test and echo in July. She follows with Dr. Ch (cardiology). Palpitations are sudden in onset, not associated with activity, resolves on its own with rest and lasts about a few minutes at a time. After palpitation episodes she sometimes has burning chest pain that self resolves on its own. Not exertional. The last time she had palpitations were in August which resolved on its own. However, over the last week the patient has had palpitations about 4x a day. 2 days ago she was seen at [facility] for these complaints and discharged from ER with normal troponins and close f/u with cardiology. Pt saw cardiologist today who sent patient into ER for further evaluation. Note in paper chart states echo and stress within year was normal. Pt also had an episode of lightheadedness, near syncope, HA, sweatiness and chills this AM when getting up from bed. This was associated with the palpitations that eventually self resolved. Of note, pt feels as though during holter she had a lower level of palpitations during the 4 days and not as severe as usual. She also has intermittent blurry vision with episodes and saw an ophthalmologist a recently and was told her eye exam is normal. She is due to see again in one week. In addition, patient has upper abdominal discomfort, decreased appetite, bloating/gas type discomfort with PO intake. This has caused decreased PO overall but denies actual localized abdominal pain. Denies bloody BMs, melena. She has had 8lb weight loss over the last month. She believes she had an abdominal US several weeks ago but does not know the result and is supposed to see GI next week. She had an EGD 3 years ago and was told of gastritis and was placed on pantoprazole but stopped this year. She had a C-scope at that time that was normal as well. She believes she was treated for H pylori 10 years ago as well. Denies FHx of stomach, colon cancer. (06 Oct 2021 22:49)    Cardiology consulted as pt is well known to the office   SHe reports feeling diaphoretic with associated dizziness and palpitations.  Denies LOC or chest pain.      PAST MEDICAL & SURGICAL HISTORY:  Episodic tension-type headache, not intractable    HTN (hypertension)    Hypothyroidism    S/P cholecystectomy      MEDICATIONS  (STANDING):  enoxaparin Injectable 40 milliGRAM(s) SubCutaneous daily  levothyroxine 25 MICROGram(s) Oral daily  losartan 50 milliGRAM(s) Oral daily  pantoprazole    Tablet 40 milliGRAM(s) Oral before breakfast      Allergies  Tylenol (Unknown)        FAMILY HISTORY:  FH: HTN (hypertension) (Mother)  Noncontributory for premature coronary disease or sudden cardiac death    SOCIAL HISTORY:    [x ] Non-smoker  [ ] Smoker  [ ] Alcohol    FLU VACCINE THIS YEAR STARTS IN AUGUST:  [ ] Yes    [ ] No    IF OVER 65 HAVE YOU EVER HAD A PNA VACCINE:  [ ] Yes    [ ] No       [ ] N/A      REVIEW OF SYSTEMS:  [ ]chest pain  [  ]shortness of breath  [ x ]palpitations  [  ]syncope  [ ]near syncope [ ]upper extremity weakness   [ ] lower extremity weakness  [  ]diplopia  [  ]altered mental status   [  ]fevers  [ ]chills [ ]nausea  [ ]vomiting  [  ]dysphagia    [ ]abdominal pain  [ ]melena  [ ]BRBPR    [  ]epistaxis  [  ]rash    [ ]lower extremity edema        [x ] All others negative	  [ ] Unable to obtain      LABS:	 	    CARDIAC MARKERS:  CARDIAC MARKERS ( 04 Oct 2021 22:50 )  <0.015 ng/mL / x     / 138 U/L / x     / x      CARDIAC MARKERS ( 04 Oct 2021 18:34 )  <0.015 ng/mL / x     / 149 U/L / x     / x        Trop T 7                        12.4   6.44  )-----------( 256      ( 07 Oct 2021 06:36 )             37.9     139  |  103  |  7   ----------------------------<  101<H>  4.0   |  23  |  0.46<L>    Ca    9.4      07 Oct 2021 06:36  Phos  3.2     10-07  Mg     2.30     10-07    TPro  7.3  /  Alb  4.4  /  TBili  1.2  /  DBili  x   /  AST  22  /  ALT  22  /  AlkPhos  98  10-07    Creatinine Trend: 0.46<--, 0.45<--, 0.52<--    PHYSICAL EXAM:  T(C): 36.8 (10-07-21 @ 10:03), Max: 36.9 (10-07-21 @ 05:36)  HR: 60 (10-07-21 @ 10:03) (53 - 60)  BP: 114/75 (10-07-21 @ 10:03) (101/60 - 139/63)  RR: 18 (10-07-21 @ 10:03) (16 - 18)  SpO2: 100% (10-07-21 @ 10:03) (100% - 100%)  Wt(kg): --   BMI (kg/m2): 29.2 (10-06-21 @ 12:57)    Gen: Appears well in NAD  HEENT:  (-)icterus (-)pallor  CV: N S1 S2 1/6 MATT (+)2 Pulses B/l  Resp:  Clear to ausculatation B/L, normal effort  GI: (+) BS Soft, NT, ND  Lymph:  (-)Edema, (-)obvious lymphadenopathy  Skin: Warm to touch, Normal turgor  Psych: Appropriate mood and affect    TELEMETRY: SB/SR	      ECG:  	NSR    < from: CT Head No Cont (10.06.21 @ 21:02) >    IMPRESSION:    HEAD CT: No acute hemorrhage or midline shift.    < end of copied text >        ASSESSMENT/PLAN: 	61F with PMHx HTN, hypothyroidism, COVID 3/2021 presenting with palpitations for one week.    --ACS ruled out  --CT WNL  --REcent cardiac work up in the office in May revealed structurally normal heart and no ischemia on a stress echo  --no further inpatient cardiac work up planned  --will refer to Dr Ochoa for OP ILR  --F/.U Dr Bridges 10/13 at [phone]

## 2021-10-07 NOTE — DISCHARGE NOTE PROVIDER - NSDCCPCAREPLAN_GEN_ALL_CORE_FT
PRINCIPAL DISCHARGE DIAGNOSIS  Diagnosis: Palpitations  Assessment and Plan of Treatment: You need to follow up with Dr Bridges for outpatient evaluation for loop recorder placement. Please call to make an appointment.      SECONDARY DISCHARGE DIAGNOSES  Diagnosis: HTN (hypertension)  Assessment and Plan of Treatment: Continue blood pressure medication regimen as directed. Monitor for any visual changes, headaches or dizziness.  Monitor blood pressure regularly.  Follow up with your primary care provider/cardiologist for further management for high blood pressure.    Diagnosis: Abdominal pain  Assessment and Plan of Treatment: You had an ultrasound of the abdomen done and it showed fatty liver. You have an appointment with Dr De Paz on October 13th for further care.

## 2021-10-12 DIAGNOSIS — Z71.89 OTHER SPECIFIED COUNSELING: ICD-10-CM

## 2021-10-13 ENCOUNTER — APPOINTMENT (OUTPATIENT)
Dept: GASTROENTEROLOGY | Facility: CLINIC | Age: 61
End: 2021-10-13
Payer: MEDICAID

## 2021-10-13 VITALS
WEIGHT: 162 LBS | HEIGHT: 67 IN | SYSTOLIC BLOOD PRESSURE: 135 MMHG | OXYGEN SATURATION: 99 % | DIASTOLIC BLOOD PRESSURE: 80 MMHG | BODY MASS INDEX: 25.43 KG/M2 | HEART RATE: 91 BPM | TEMPERATURE: 97.2 F

## 2021-10-13 DIAGNOSIS — K76.0 FATTY (CHANGE OF) LIVER, NOT ELSEWHERE CLASSIFIED: ICD-10-CM

## 2021-10-13 DIAGNOSIS — R10.13 EPIGASTRIC PAIN: ICD-10-CM

## 2021-10-13 DIAGNOSIS — K62.89 OTHER SPECIFIED DISEASES OF ANUS AND RECTUM: ICD-10-CM

## 2021-10-13 DIAGNOSIS — R10.84 GENERALIZED ABDOMINAL PAIN: ICD-10-CM

## 2021-10-13 PROBLEM — E03.9 HYPOTHYROIDISM, UNSPECIFIED: Chronic | Status: ACTIVE | Noted: 2021-10-06

## 2021-10-13 PROBLEM — I10 ESSENTIAL (PRIMARY) HYPERTENSION: Chronic | Status: ACTIVE | Noted: 2021-10-06

## 2021-10-13 PROCEDURE — 99215 OFFICE O/P EST HI 40 MIN: CPT

## 2021-10-13 RX ORDER — HYDROCORTISONE 25 MG/G
2.5 CREAM TOPICAL
Qty: 2 | Refills: 3 | Status: ACTIVE | COMMUNITY
Start: 2021-10-13 | End: 1900-01-01

## 2021-10-13 RX ORDER — HYDROCORTISONE ACETATE 25 MG/1
25 SUPPOSITORY RECTAL
Qty: 30 | Refills: 3 | Status: ACTIVE | COMMUNITY
Start: 2021-10-13 | End: 1900-01-01

## 2021-10-13 RX ORDER — SIMETHICONE 180 MG
180 CAPSULE ORAL 4 TIMES DAILY
Qty: 120 | Refills: 3 | Status: ACTIVE | COMMUNITY
Start: 2021-10-13 | End: 1900-01-01

## 2021-10-13 RX ORDER — PAROXETINE HYDROCHLORIDE 10 MG/1
10 TABLET, FILM COATED ORAL
Refills: 0 | Status: ACTIVE | COMMUNITY

## 2021-10-13 RX ORDER — SUCRALFATE 1 G/1
1 TABLET ORAL 4 TIMES DAILY
Qty: 120 | Refills: 3 | Status: ACTIVE | COMMUNITY
Start: 2021-10-13 | End: 1900-01-01

## 2021-10-13 RX ORDER — PANTOPRAZOLE 40 MG/1
40 TABLET, DELAYED RELEASE ORAL
Refills: 0 | Status: ACTIVE | COMMUNITY

## 2021-10-13 NOTE — ASSESSMENT
[FreeTextEntry1] : Abdominal Pain: The patient complains of abdominal pain. The patient is to avoid nonsteroidal anti-inflammatory drugs and aspirin.  I recommend a trial of Carafate 1 gram PO 4 times a day for 3 months for the symptoms.\par Dyspepsia: The patient complains of dyspeptic symptoms.  The patient was advised to abide by an anti-gas diet.  The patient was given a pamphlet for anti-gas.  The patient and I reviewed the anti-gas diet at length. The patient is to start on a trial of Phazyme one tablet 3 times a day p.r.n. abdominal pain and gas.\par Constipation: The patient complains of constipation. I recommend a high-fiber diet. I recommend a trial of a probiotic such as Align once a day. I recommend a trial of Metamucil once a day for fiber supplementation. I recommend a trial of Mirialax 1 packet once a day for the constipation. If the symptoms persist, the patient may require a colonoscopy to assess the symptoms.  The patient was told of the risks and benefits of the procedure.  The patient was told of the risks of perforation, emergency surgery, bleeding, infections and missed lesions.  The patient agreed and will followup to reassess the symptoms.  \par Rectal Pain: The patient had episodes of rectal pain.  The etiology of the rectal pain is unclear.  I recommend a trial of Anusol HC suppositories one per rectum QHS and Anusol HC 2.5% cream apply to affected area twice a day PRN hemorrhoidal bleeding or pain.  I recommend a trial of Calmoseptine cream apply to affected area twice a day for rectal discomfort. I recommend Tucks pads for the hemorrhoids.  I recommend starting Sitz baths twice a day for the hemorrhoids.  I recommend avoid wearing tight underwear and use boxers. I recommend avoid touching the perineal area. If the symptoms persist, the patient may require a colonoscopy to assess the site of bleeding. The patient was told of the risks and benefits of the procedure.  The patient was told of the risks of perforation, emergency surgery, bleeding, infections and missed lesions. The patient agrees and will follow up to assess the symptoms\par Prior Endoscopic Procedures: The patient had a prior upper endoscopy and colonoscopy performed by another gastroenterologist, Dr. Suzanne Lynch. I will try to obtain the prior upper endoscopy and colonoscopy reports. The patient is to sign a record release to obtain those records.\par Blood Work: I recommend blood work to assess the liver. I recommend a CBC, SMA 24, amylase, lipase, ESR, TFTs, MARLENI, rheumatoid factor, celiac sprue panel, IgA, profile for hepatitis A, B, C. ,AFP, alpha 1 anti-trypsin antibody, ceruloplasmin level, iron, TIBC, ferritin level, AMA, anti smooth muscle antibody and PT/INR/PTT in 6 months. I reviewed  the recent blood work performed by the patient's PMD.\par Prior ER Evaluation and Hospitalization: The patient was previously evaluated at the Hospital  emergency room for palpitations.   The patient had blood work and imaging studies to assess the symptoms.  I reviewed the blood work and imaging studies performed in the emergency room.  \par I reviewed the recent abdominal ultrasound to assess the liver parenchyma and for liver lesions.\par Fatty Liver: The patient had an imaging study suggestive of fatty infiltration of the liver. The patient denies any jaundice or pruritus. The patient denies any alcohol use. The patient denies taking large doses of nonsteroidal anti-inflammatory drugs or acetaminophen. The findings are suggestive of fatty liver. The patient and I had a long discussion regarding the risks of fatty liver and possible progression to cirrhosis. The patient was told of the possible increased risk of developing liver failure, cirrhosis, ascites, GI bleeding secondary to varices, hepatic encephalopathy, bleeding tendencies and liver cancer. The patient was told of the importance of follow-up. The patient was advised to follow up every 6 months for blood work and imaging studies. The patient agreed and will follow up. The patient was advised to lose weight. I recommend a trial of vitamin E supplementation for the fatty liver. If the liver enzymes remain elevated, the patient may require a trial of Pioglitazone for the fatty liver. I recommend avoid alcohol and hepato-toxic agents. The patient was also advised to avoid NSAIDs, Acetaminophen and any other hepatotoxic drugs. The patient was also advised not to share needles, razors, scissors, nail clippers, etc.. The patient is to continue close follow-up in our office for blood work and exams. If the liver enzymes remain elevated, the patient may require a CT guided liver biopsy to assess the liver parenchyma for possible treatment. We had a long discussion regarding the risks and benefits of the procedure. The patient was told of the risks of bleeding, perforation, infections, emergency surgery and missing lesions. The patient agreed and will follow-up to reassess the symptoms. \par If the symptoms persist, the patient may require an upper endoscopy to assess for peptic ulcer disease versus esophagitis. The patient was told of the risks and benefits of the procedure. The patient was told of the risks of perforation, emergency surgery, bleeding, infections and missed lesions. The patient agreed and will follow-up to reassess the symptoms.\par Follow-up: The patient is to follow-up in the office in 1 month to reassess the symptoms. The patient was told to call the office if any further problems. \par \par

## 2021-10-13 NOTE — HISTORY OF PRESENT ILLNESS
[None] : had no significant interval events [Heartburn] : denies heartburn [Nausea] : denies nausea [Vomiting] : denies vomiting [Diarrhea] : denies diarrhea [Yellow Skin Or Eyes (Jaundice)] : denies jaundice [Rectal Pain] : denies rectal pain [Wt Loss ___ Lbs] : recent [unfilled] ~Upound(s) weight loss [Constipation] : constipation [Abdominal Pain] : abdominal pain [Abdominal Swelling] : abdominal swelling [Wt Gain ___ Lbs] : no recent weight gain [GERD] : no gastroesophageal reflux disease [Hiatus Hernia] : no hiatus hernia [Peptic Ulcer Disease] : no peptic ulcer disease [Pancreatitis] : no pancreatitis [Cholelithiasis] : no cholelithiasis [Kidney Stone] : no kidney stone [Inflammatory Bowel Disease] : no inflammatory bowel disease [Irritable Bowel Syndrome] : no irritable bowel syndrome [Diverticulitis] : no diverticulitis [Alcohol Abuse] : no alcohol abuse [Malignancy] : no malignancy [Abdominal Surgery] : no abdominal surgery [Appendectomy] : no appendectomy [Cholecystectomy] : no cholecystectomy [de-identified] : The abdominal ultrasound performed on August 19, 2021 revealed fatty infiltration and/or other liver parenchymal disorders and status post cholecystectomy.  The patient was evaluated at [facility] [place] on October 4, 2021 for palpitations.  The blood work performed on October 4, 2021 revealed an elevated blood glucose of 122 mg/dL, and elevated chloride level of 109 mmol/L, and elevated blood glucose of 116 mg/dL, normal liver enzymes with a total bilirubin of 0.7 mg/dL, a normal alkaline phosphatase/AST/ALT of 101/21/30 U/L, respectively, no evidence of anemia with a hemoglobin/hematocrit level of 12.2/37.6, respectively.  The chest x-ray performed on October 4, 2021 revealed mild pulmonary vascular congestion with cardiomegaly.  The patient was stabilized and discharged from the emergency room for further work-up and treatment as an outpatient.  The patient was recently hospitalized at [facility] on October 6, 2021 for palpitations x1 week.  The patient is being followed by her cardiologist, Dr. Brewer.  The initial blood work performed in the emergency room on October 6, 2021 revealed no evidence of anemia with a hemoglobin/hematocrit level of 12.6/38.0, respectively, and elevated blood glucose of 106 mg/dL, a low creatinine level of 0.5 mg/dL, normal liver enzymes with a total bilirubin of 0.9 mg/dL, a normal alkaline phosphatase/AST/ALT of 103/21/20 U/L, respectively.  The urine culture was negative with <10,000 CFU per milliliter of normal urogenital bobbi.  The CAT scan of the head without contrast performed on August 2, 2020 revealed no acute hemorrhage or midline shift.  The chest x-ray performed on October 6, 2021 revealed clear lungs.  The abdominal ultrasound performed on October 7, 2021 revealed fatty liver and cholecystectomy with post cholecystectomy biliary ductal dilatation.  An acute coronary syndrome was ruled out.  A recent cardiac work-up in the office in May revealed structurally normal heart with no evidence of ischemia on stress echo the patient was discharged on October 7, 2021 and advised to follow-up in the office for further work-up and treatment. [de-identified] : The patient has a history significant for hypertension and hypothyroidism, coronary artery disease. The patient denies any prior exposure to hepatitis A, B or C. The patient denies any large doses of nonsteroidal anti-inflammatory drugs or acetaminophen. The patient denies sharing needles, razors, nail clippers, nail files, scissors, et cetera. The patient denies any EtOH abuse, cocaine use or intravenous drug use. The patient denies any prior surgery, blood transfusions, sexual indiscretions, tattoos or piercing. The patient admits to having prior surgery. The history of surgery is significant for a prior cholecystectomy, tubal ligation and . The patient admits to a family history of GI or liver problems. The patient’s mother had a history of colitis. The patient states that she is feeling uncomfortable x 1 week.  The patient is feeling depressed and is on Paroxetine. The patient is being followed by her cardiologist, Dr. Bridges ([phone]) for palpitations.   According to the patient, the holter monitor was unremarkable. The abdominal ultrasound performed on 2021 revealed fatty infiltration and/or other liver parenchymal disorders and status post cholecystectomy.  The patient was evaluated at [facility] [place] on 2021 for palpitations.  The blood work performed on 2021 revealed an elevated blood glucose of 122 mg/dL, and elevated chloride level of 109 mmol/L, and elevated blood glucose of 116 mg/dL, normal liver enzymes with a total bilirubin of 0.7 mg/dL, a normal alkaline phosphatase/AST/ALT of 101/21/30 U/L, respectively, no evidence of anemia with a hemoglobin/hematocrit level of 12.2/37.6, respectively.  The chest x-ray performed on 2021 revealed mild pulmonary vascular congestion with cardiomegaly.  The patient was stabilized and discharged from the emergency room for further work-up and treatment as an outpatient.  The patient was recently hospitalized at [facility] on 2021 for palpitations x1 week.  The patient is being followed by her cardiologist, Dr. Brewer.  The initial blood work performed in the emergency room on 2021 revealed no evidence of anemia with a hemoglobin/hematocrit level of 12.6/38.0, respectively, and elevated blood glucose of 106 mg/dL, a low creatinine level of 0.5 mg/dL, normal liver enzymes with a total bilirubin of 0.9 mg/dL, a normal alkaline phosphatase/AST/ALT of 103/21/20 U/L, respectively.  The urine culture was negative with <10,000 CFU per milliliter of normal urogenital bobbi.  The CAT scan of the head without contrast performed on 2020 revealed no acute hemorrhage or midline shift.  The chest x-ray performed on 2021 revealed clear lungs.  The abdominal ultrasound performed on 2021 revealed fatty liver and cholecystectomy with post cholecystectomy biliary ductal dilatation.  An acute coronary syndrome was ruled out.  A recent cardiac work-up in the office in May revealed structurally normal heart with no evidence of ischemia on stress echo the patient was discharged on 2021 and advised to follow-up in the office for further work-up and treatment.  The patient denies any jaundice or pruritus.  The patient complains of chronic lower back pain. The patient complains of abdominal pain.  The patient describes the abdominal pain as a burning, intermittent midepigastric and left lower quadrant discomfort that occasionally radiates to the back.  The abdominal pain is unrelated to meals or passing gas or having bowel movements.  The abdominal pain is described as being mild in nature.  The abdominal pain occurs at night and in the morning.  The abdominal pain can occur at any time.   The abdominal pain has never awakened the patient from sleep.  The abdominal pain is not relieved with medication.  The abdominal pain is associated with abdominal gas and bloating.  The patient denies any nausea or vomiting.  The patient denies any gastroesophageal reflux disease or dysphagia.  The patient complains of palpitations but denies any atypical chest pain or shortness of breath.  The patient admits to occasional episodes of diaphoresis.  The patient complains of constipation but denies any diarrhea.  The patient has 1 bowel movement a day.  The patient complains of a change in bowel habits.  The patient complains of a change in caliber of stool.   The patient admits to having mucus discharge with the movements.  The patient denies any bright red blood per rectum, melena or hematemesis.  The patient complains of rectal pain but denies any rectal pruritus.  The patient complains of weight loss and anorexia.  The patient admits to losing 10 pounds over the past 1 month.  She denies any fevers or chills.  The patient had a colonoscopy to the cecum performed at the office on 2011. The study was very limited secondary to retained solid and liquid stool scattered throughout the colon but no gross lesions were noted. Unable to comment on small colonic polyps or masses secondary to the poor prep. The findings revealed mild left-sided diverticulosis and small internal hemorrhoids. There were no polyps, masses, AVMs or colitis noted. The patient tolerated the procedure well.  The blood work performed on May 21, 2021 revealed an elevated PTT of 36.3 seconds, a normal alpha-fetoprotein level of 2.3 ng/mL, an elevated blood glucose of 105 mg/dL, normal liver enzymes were normal total bilirubin of 1.1 mg/dL, normal alkaline phosphatase/AST/ALT/GGTP of 105/19/27/16 U/L, respectively, no evidence of anemia with a hemoglobin/hematocrit level of 12.7/42.7, respectively, and elevated ferritin level of 233 ng/mL, and reactive hepatitis A IgG antibody and elevated ESR of 28 mm/h. The stool guaiac was negative for occult blood.  The patient admits to having a prior upper endoscopy and colonoscopy performed by another gastroenterologist, Dr. Suzanne Lynch. According to the patient, the upper endoscopic findings revealed severe gastritis. The colonoscopy performed on 2018 findings revealed internal hemorrhoids. The patient admits to a family history of GI problems. The patient’s mother had a history of colitis.  [de-identified] : (-) smoking, (-) ETOH, (-) IVDA\par

## 2021-10-26 ENCOUNTER — APPOINTMENT (OUTPATIENT)
Dept: NEUROLOGY | Facility: CLINIC | Age: 61
End: 2021-10-26

## 2021-11-01 PROCEDURE — G9005: CPT

## 2021-11-10 ENCOUNTER — APPOINTMENT (OUTPATIENT)
Dept: GASTROENTEROLOGY | Facility: CLINIC | Age: 61
End: 2021-11-10

## 2021-12-21 NOTE — ED PROVIDER NOTE - CONSTITUTIONAL, MLM
Mohs Case Number: [id number] Biopsy Photograph Reviewed: Yes Consent Type: Consent 1 (Standard) Eye Shield Used: No Initial Size Of Lesion: 1.3 X Size Of Lesion In Cm (Optional): 0.9 Number Of Stages: 1 Primary Defect Length In Cm (Final Defect Size - Required For Flaps/Grafts): 1.6 Primary Defect Width In Cm (Final Defect Size - Required For Flaps/Grafts): 1.2 Repair Type: Intermediate Layered Repair Oculoplastic Surgeon Procedure Text (A): After obtaining clear surgical margins the patient was sent to oculoplastics for surgical repair.  The patient understands they will receive post-surgical care and follow-up from the referring physician's office. normal... Otolaryngologist Procedure Text (A): After obtaining clear surgical margins the patient was sent to otolaryngology for surgical repair.  The patient understands they will receive post-surgical care and follow-up from the referring physician's office. Plastic Surgeon Procedure Text (A): After obtaining clear surgical margins the patient was sent to plastics for surgical repair.  The patient understands they will receive post-surgical care and follow-up from the referring physician's office. Mid-Level Procedure Text (A): After obtaining clear surgical margins the patient was sent to a mid-level provider for surgical repair.  The patient understands they will receive post-surgical care and follow-up from the mid-level provider. Provider Procedure Text (A): After obtaining clear surgical margins the defect was repaired by another provider. Asc Procedure Text (A): After obtaining clear surgical margins the patient was sent to an ASC for surgical repair.  The patient understands they will receive post-surgical care and follow-up from the ASC physician. Simple / Intermediate / Complex Repair - Final Wound Length In Cm: 3 Suturegard Retention Suture: 2-0 Nylon Retention Suture Bite Size: 3 mm Length To Time In Minutes Device Was In Place: 10 Undermining Type: Entire Wound Debridement Text: The wound edges were debrided prior to proceeding with the closure to facilitate wound healing. Helical Rim Text: The closure involved the helical rim. Vermilion Border Text: The closure involved the vermilion border. Nostril Rim Text: The closure involved the nostril rim. Retention Suture Text: Retention sutures were placed to support the closure and prevent dehiscence. Secondary Defect Length In Cm (Required For Flaps): 0 Location Indication Override (Is Already Calculated Based On Selected Body Location): Area M Area H Indication Text: Tumors in this location are included in Area H (eyelids, eyebrows, nose, lips, chin, ear, pre-auricular, post-auricular, temple, genitalia, hands, feet, ankles and areola).  Tissue conservation is critical in these anatomic locations. Area M Indication Text: Tumors in this location are included in Area M (cheek, forehead, scalp, neck, jawline and pretibial skin).  Mohs surgery is indicated for tumors in these anatomic locations. Area L Indication Text: Tumors in this location are included in Area L (trunk and extremities).  Mohs surgery is indicated for larger tumors, or tumors with aggressive histologic features, in these anatomic locations. Tumor Debulked?: curette Depth Of Tumor Invasion (For Histology): tumor not visualized (deep and peripheral margins are clear of tumor) Stage 1: Number Of Blocks?: 2 Special Stains Stage 1 - Results: Base On Clearance Noted Above Stage 2: Additional Anesthesia Type: 1% lidocaine with epinephrine Staging Info: By selecting yes to the question above you will include information on AJCC 8 tumor staging in your Mohs note. Information on tumor staging will be automatically added for SCCs on the head and neck. AJCC 8 includes tumor size, tumor depth, perineural involvement and bone invasion. Tumor Depth: Less than 6mm from granular layer and no invasion beyond the subcutaneous fat Well appearing, awake, alert, oriented to person, place, time/situation and in no apparent distress. Why Was The Change Made?: Please Select the Appropriate Response Medical Necessity Statement: Based on my medical judgement, Mohs surgery is the most appropriate treatment for this cancer compared to other treatments. Alternatives Discussed Intro (Do Not Add Period): I discussed alternative treatments to Mohs surgery and specifically discussed the risks and benefits of Consent 1/Introductory Paragraph: The rationale for Mohs was explained to the patient and consent was obtained. The risks, benefits and alternatives to therapy were discussed in detail. Specifically, the risks of infection, scarring, bleeding, prolonged wound healing, incomplete removal, allergy to anesthesia, nerve injury and recurrence were addressed. Prior to the procedure, the treatment site was clearly identified and confirmed by the patient. All components of Universal Protocol/PAUSE Rule completed. Consent 2/Introductory Paragraph: Mohs surgery was explained to the patient and consent was obtained. The risks, benefits and alternatives to therapy were discussed in detail. Specifically, the risks of infection, scarring, bleeding, prolonged wound healing, incomplete removal, allergy to anesthesia, nerve injury and recurrence were addressed. Prior to the procedure, the treatment site was clearly identified and confirmed by the patient. All components of Universal Protocol/PAUSE Rule completed. Consent 3/Introductory Paragraph: I gave the patient a chance to ask questions they had about the procedure.  Following this I explained the Mohs procedure and consent was obtained. The risks, benefits and alternatives to therapy were discussed in detail. Specifically, the risks of infection, scarring, bleeding, prolonged wound healing, incomplete removal, allergy to anesthesia, nerve injury and recurrence were addressed. Prior to the procedure, the treatment site was clearly identified and confirmed by the patient. All components of Universal Protocol/PAUSE Rule completed. Consent (Temporal Branch)/Introductory Paragraph: The rationale for Mohs was explained to the patient and consent was obtained. The risks, benefits and alternatives to therapy were discussed in detail. Specifically, the risks of damage to the temporal branch of the facial nerve, infection, scarring, bleeding, prolonged wound healing, incomplete removal, allergy to anesthesia, and recurrence were addressed. Prior to the procedure, the treatment site was clearly identified and confirmed by the patient. All components of Universal Protocol/PAUSE Rule completed. Consent (Marginal Mandibular)/Introductory Paragraph: The rationale for Mohs was explained to the patient and consent was obtained. The risks, benefits and alternatives to therapy were discussed in detail. Specifically, the risks of damage to the marginal mandibular branch of the facial nerve, infection, scarring, bleeding, prolonged wound healing, incomplete removal, allergy to anesthesia, and recurrence were addressed. Prior to the procedure, the treatment site was clearly identified and confirmed by the patient. All components of Universal Protocol/PAUSE Rule completed. Consent (Spinal Accessory)/Introductory Paragraph: The rationale for Mohs was explained to the patient and consent was obtained. The risks, benefits and alternatives to therapy were discussed in detail. Specifically, the risks of damage to the spinal accessory nerve, infection, scarring, bleeding, prolonged wound healing, incomplete removal, allergy to anesthesia, and recurrence were addressed. Prior to the procedure, the treatment site was clearly identified and confirmed by the patient. All components of Universal Protocol/PAUSE Rule completed. Consent (Near Eyelid Margin)/Introductory Paragraph: The rationale for Mohs was explained to the patient and consent was obtained. The risks, benefits and alternatives to therapy were discussed in detail. Specifically, the risks of ectropion or eyelid deformity, infection, scarring, bleeding, prolonged wound healing, incomplete removal, allergy to anesthesia, nerve injury and recurrence were addressed. Prior to the procedure, the treatment site was clearly identified and confirmed by the patient. All components of Universal Protocol/PAUSE Rule completed. Consent (Ear)/Introductory Paragraph: The rationale for Mohs was explained to the patient and consent was obtained. The risks, benefits and alternatives to therapy were discussed in detail. Specifically, the risks of ear deformity, infection, scarring, bleeding, prolonged wound healing, incomplete removal, allergy to anesthesia, nerve injury and recurrence were addressed. Prior to the procedure, the treatment site was clearly identified and confirmed by the patient. All components of Universal Protocol/PAUSE Rule completed. Consent (Nose)/Introductory Paragraph: The rationale for Mohs was explained to the patient and consent was obtained. The risks, benefits and alternatives to therapy were discussed in detail. Specifically, the risks of nasal deformity, changes in the flow of air through the nose, infection, scarring, bleeding, prolonged wound healing, incomplete removal, allergy to anesthesia, nerve injury and recurrence were addressed. Prior to the procedure, the treatment site was clearly identified and confirmed by the patient. All components of Universal Protocol/PAUSE Rule completed. Consent (Lip)/Introductory Paragraph: The rationale for Mohs was explained to the patient and consent was obtained. The risks, benefits and alternatives to therapy were discussed in detail. Specifically, the risks of lip deformity, changes in the oral aperture, infection, scarring, bleeding, prolonged wound healing, incomplete removal, allergy to anesthesia, nerve injury and recurrence were addressed. Prior to the procedure, the treatment site was clearly identified and confirmed by the patient. All components of Universal Protocol/PAUSE Rule completed. Consent (Scalp)/Introductory Paragraph: The rationale for Mohs was explained to the patient and consent was obtained. The risks, benefits and alternatives to therapy were discussed in detail. Specifically, the risks of changes in hair growth pattern secondary to repair, infection, scarring, bleeding, prolonged wound healing, incomplete removal, allergy to anesthesia, nerve injury and recurrence were addressed. Prior to the procedure, the treatment site was clearly identified and confirmed by the patient. All components of Universal Protocol/PAUSE Rule completed. Detail Level: Detailed Postop Diagnosis: same Surgeon: Renato Green MD Anesthesia Type: 0.5% lidocaine, 0.25% Marcaine, 1:200,000 epinephrine and a 1:10 solution of sodium bicarbonate Additional Anesthesia Volume In Cc: 6 Hemostasis: Electrodesiccation Estimated Blood Loss (Cc): minimal Repair Anesthesia Method: local infiltration Brow Lift Text: A midfrontal incision was made medially to the defect to allow access to the tissues just superior to the left eyebrow. Following careful dissection inferiorly in a supraperiosteal plane to the level of the left eyebrow, several 3-0 monocryl sutures were used to resuspend the eyebrow orbicularis oculi muscular unit to the superior frontal bone periosteum. This resulted in an appropriate reapproximation of static eyebrow symmetry and correction of the left brow ptosis. Deep Sutures: 4-0 Vicryl Epidermal Sutures: 5-0 Ethilon Epidermal Closure: running Suturegard Intro: Intraoperative tissue expansion was performed, utilizing the SUTUREGARD device, in order to reduce wound tension. Suturegard Body: The suture ends were repeatedly re-tightened and re-clamped to achieve the desired tissue expansion. Hemigard Intro: Due to skin fragility and wound tension, it was decided to use HEMIGARD adhesive retention suture devices to permit a linear closure. The skin was cleaned and dried for a 6cm distance away from the wound. Excessive hair, if present, was removed to allow for adhesion. Hemigard Postcare Instructions: The HEMIGARD strips are to remain completely dry for at least 5-7 days. Donor Site Anesthesia Type: same as repair anesthesia Graft Basting Suture (Optional): 5-0 Fast Absorbing Gut Graft Donor Site Bandage (Optional-Leave Blank If You Don't Want In Note): pressure bandage were applied to the donor site. Closure 2 Information: This tab is for additional flaps and grafts, including complex repair and grafts and complex repair and flaps. You can also specify a different location for the additional defect, if the location is the same you do not need to select a new one. We will insert the automated text for the repair you select below just as we do for solitary flaps and grafts. Please note that at this time if you select a location with a different insurance zone you will need to override the ICD10 and CPT if appropriate. Closure 3 Information: This tab is for additional flaps and grafts above and beyond our usual structured repairs.  Please note if you enter information here it will not currently bill and you will need to add the billing information manually. Wound Care: Petrolatum Dressing: pressure dressing Dressing (No Sutures): dry sterile dressing Suture Removal: 7 days Unna Boot Text: An Unna boot was placed to help immobilize the limb and facilitate more rapid healing. Home Suture Removal Text: Patient was provided instructions on removing sutures and will remove their sutures at home.  If they have any questions or difficulties they will call the office. Post-Care Instructions: I reviewed with the patient in detail post-care instructions. Patient is not to engage in any heavy lifting, exercise, or swimming for the next 14 days. Should the patient develop any fevers, chills, bleeding, severe pain patient will contact the office immediately. Pain Refusal Text: I offered to prescribe pain medication but the patient refused to take this medication. Mauc Instructions: By selecting yes to the question below the MAUC number will be added into the note.  This will be calculated automatically based on the diagnosis chosen, the size entered, the body zone selected (H,M,L) and the specific indications you chose. You will also have the option to override the Mohs AUC if you disagree with the automatically calculated number and this option is found in the Case Summary tab. Where Do You Want The Question To Include Opioid Counseling Located?: Case Summary Tab Eye Protection Verbiage: Before proceeding with the stage, a plastic scleral shield was inserted. The globe was anesthetized with a few drops of 1% lidocaine with 1:100,000 epinephrine. Then, an appropriate sized scleral shield was chosen and coated with lacrilube ointment. The shield was gently inserted and left in place for the duration of each stage. After the stage was completed, the shield was gently removed. Mohs Method Verbiage: An incision at a 45 degree angle following the standard Mohs approach was done and the specimen was harvested as a microscopic controlled layer. Surgeon/Pathologist Verbiage (Will Incorporate Name Of Surgeon From Intro If Not Blank): operated in two distinct and integrated capacities as the surgeon and pathologist. Mohs Histo Method Verbiage: Each section was then chromacoded and processed in the Mohs lab using the Mohs protocol and submitted for frozen section. Subsequent Stages Histo Method Verbiage: Using a similar technique to that described above, a thin layer of tissue was removed from all areas where tumor was visible on the previous stage.  The tissue was again oriented, mapped, dyed, and processed as above. Mohs Rapid Report Verbiage: The area of clinically evident tumor was marked with skin marking ink and appropriately hatched.  The initial incision was made following the Mohs approach through the skin.  The specimen was taken to the lab, divided into the necessary number of pieces, chromacoded and processed according to the Mohs protocol.  This was repeated in successive stages until a tumor free defect was achieved. Complex Repair Preamble Text (Leave Blank If You Do Not Want): Extensive wide undermining was performed. Intermediate Repair Preamble Text (Leave Blank If You Do Not Want): Undermining was performed with blunt dissection. Non-Graft Cartilage Fenestration Text: The cartilage was fenestrated with a 2mm punch biopsy to help facilitate healing. Graft Cartilage Fenestration Text: The cartilage was fenestrated with a 2mm punch biopsy to help facilitate graft survival and healing. Secondary Intention Text (Leave Blank If You Do Not Want): The defect will heal with secondary intention. No Repair - Repaired With Adjacent Surgical Defect Text (Leave Blank If You Do Not Want): After obtaining clear surgical margins the defect was repaired concurrently with another surgical defect which was in close approximation. Adjacent Tissue Transfer Text: The defect edges were debeveled with a #15 scalpel blade.  Given the location of the defect and the proximity to free margins an adjacent tissue transfer was deemed most appropriate.  Using a sterile surgical marker, an appropriate flap was drawn incorporating the defect and placing the expected incisions within the relaxed skin tension lines where possible.    The area thus outlined was incised deep to adipose tissue with a #15 scalpel blade.  The skin margins were undermined to an appropriate distance in all directions utilizing iris scissors. Advancement Flap (Single) Text: The defect edges were debeveled with a #15 scalpel blade.  Given the location of the defect and the proximity to free margins a single advancement flap was deemed most appropriate.  Using a sterile surgical marker, an appropriate advancement flap was drawn incorporating the defect and placing the expected incisions within the relaxed skin tension lines where possible.    The area thus outlined was incised deep to adipose tissue with a #15 scalpel blade.  The skin margins were undermined to an appropriate distance in all directions utilizing iris scissors. Advancement Flap (Double) Text: The defect edges were debeveled with a #15 scalpel blade.  Given the location of the defect and the proximity to free margins a double advancement flap was deemed most appropriate.  Using a sterile surgical marker, the appropriate advancement flaps were drawn incorporating the defect and placing the expected incisions within the relaxed skin tension lines where possible.    The area thus outlined was incised deep to adipose tissue with a #15 scalpel blade.  The skin margins were undermined to an appropriate distance in all directions utilizing iris scissors. Burow's Advancement Flap Text: The defect edges were debeveled with a #15 scalpel blade.  Given the location of the defect and the proximity to free margins a Burow's advancement flap was deemed most appropriate.  Using a sterile surgical marker, the appropriate advancement flap was drawn incorporating the defect and placing the expected incisions within the relaxed skin tension lines where possible.    The area thus outlined was incised deep to adipose tissue with a #15 scalpel blade.  The skin margins were undermined to an appropriate distance in all directions utilizing iris scissors. Chonodrocutaneous Helical Advancement Flap Text: The defect edges were debeveled with a #15 scalpel blade.  Given the location of the defect and the proximity to free margins a chondrocutaneous helical advancement flap was deemed most appropriate.  Using a sterile surgical marker, the appropriate advancement flap was drawn incorporating the defect and placing the expected incisions within the relaxed skin tension lines where possible.    The area thus outlined was incised deep to adipose tissue with a #15 scalpel blade.  The skin margins were undermined to an appropriate distance in all directions utilizing iris scissors. Crescentic Advancement Flap Text: The defect edges were debeveled with a #15 scalpel blade.  Given the location of the defect and the proximity to free margins a crescentic advancement flap was deemed most appropriate.  Using a sterile surgical marker, the appropriate advancement flap was drawn incorporating the defect and placing the expected incisions within the relaxed skin tension lines where possible.    The area thus outlined was incised deep to adipose tissue with a #15 scalpel blade.  The skin margins were undermined to an appropriate distance in all directions utilizing iris scissors. A-T Advancement Flap Text: The defect edges were debeveled with a #15 scalpel blade.  Given the location of the defect, shape of the defect and the proximity to free margins an A-T advancement flap was deemed most appropriate.  Using a sterile surgical marker, an appropriate advancement flap was drawn incorporating the defect and placing the expected incisions within the relaxed skin tension lines where possible.    The area thus outlined was incised deep to adipose tissue with a #15 scalpel blade.  The skin margins were undermined to an appropriate distance in all directions utilizing iris scissors. O-T Advancement Flap Text: The defect edges were debeveled with a #15 scalpel blade.  Given the location of the defect, shape of the defect and the proximity to free margins an O-T advancement flap was deemed most appropriate.  Using a sterile surgical marker, an appropriate advancement flap was drawn incorporating the defect and placing the expected incisions within the relaxed skin tension lines where possible.    The area thus outlined was incised deep to adipose tissue with a #15 scalpel blade.  The skin margins were undermined to an appropriate distance in all directions utilizing iris scissors. O-L Flap Text: The defect edges were debeveled with a #15 scalpel blade.  Given the location of the defect, shape of the defect and the proximity to free margins an O-L flap was deemed most appropriate.  Using a sterile surgical marker, an appropriate advancement flap was drawn incorporating the defect and placing the expected incisions within the relaxed skin tension lines where possible.    The area thus outlined was incised deep to adipose tissue with a #15 scalpel blade.  The skin margins were undermined to an appropriate distance in all directions utilizing iris scissors. O-Z Flap Text: The defect edges were debeveled with a #15 scalpel blade.  Given the location of the defect, shape of the defect and the proximity to free margins an O-Z flap was deemed most appropriate.  Using a sterile surgical marker, an appropriate transposition flap was drawn incorporating the defect and placing the expected incisions within the relaxed skin tension lines where possible. The area thus outlined was incised deep to adipose tissue with a #15 scalpel blade.  The skin margins were undermined to an appropriate distance in all directions utilizing iris scissors. Double O-Z Flap Text: The defect edges were debeveled with a #15 scalpel blade.  Given the location of the defect, shape of the defect and the proximity to free margins a Double O-Z flap was deemed most appropriate.  Using a sterile surgical marker, an appropriate transposition flap was drawn incorporating the defect and placing the expected incisions within the relaxed skin tension lines where possible. The area thus outlined was incised deep to adipose tissue with a #15 scalpel blade.  The skin margins were undermined to an appropriate distance in all directions utilizing iris scissors. V-Y Flap Text: The defect edges were debeveled with a #15 scalpel blade.  Given the location of the defect, shape of the defect and the proximity to free margins a V-Y flap was deemed most appropriate.  Using a sterile surgical marker, an appropriate advancement flap was drawn incorporating the defect and placing the expected incisions within the relaxed skin tension lines where possible.    The area thus outlined was incised deep to adipose tissue with a #15 scalpel blade.  The skin margins were undermined to an appropriate distance in all directions utilizing iris scissors. Advancement-Rotation Flap Text: The defect edges were debeveled with a #15 scalpel blade.  Given the location of the defect, shape of the defect and the proximity to free margins an advancement-rotation flap was deemed most appropriate.  Using a sterile surgical marker, an appropriate flap was drawn incorporating the defect and placing the expected incisions within the relaxed skin tension lines where possible. The area thus outlined was incised deep to adipose tissue with a #15 scalpel blade.  The skin margins were undermined to an appropriate distance in all directions utilizing iris scissors. Mercedes Flap Text: The defect edges were debeveled with a #15 scalpel blade.  Given the location of the defect, shape of the defect and the proximity to free margins a Mercedes flap was deemed most appropriate.  Using a sterile surgical marker, an appropriate advancement flap was drawn incorporating the defect and placing the expected incisions within the relaxed skin tension lines where possible. The area thus outlined was incised deep to adipose tissue with a #15 scalpel blade.  The skin margins were undermined to an appropriate distance in all directions utilizing iris scissors. Modified Advancement Flap Text: The defect edges were debeveled with a #15 scalpel blade.  Given the location of the defect, shape of the defect and the proximity to free margins a modified advancement flap was deemed most appropriate.  Using a sterile surgical marker, an appropriate advancement flap was drawn incorporating the defect and placing the expected incisions within the relaxed skin tension lines where possible.    The area thus outlined was incised deep to adipose tissue with a #15 scalpel blade.  The skin margins were undermined to an appropriate distance in all directions utilizing iris scissors. Mucosal Advancement Flap Text: Given the location of the defect, shape of the defect and the proximity to free margins a mucosal advancement flap was deemed most appropriate. Incisions were made with a 15 blade scalpel in the appropriate fashion along the cutaneous vermilion border and the mucosal lip. The remaining actinically damaged mucosal tissue was excised.  The mucosal advancement flap was then elevated to the gingival sulcus with care taken to preserve the neurovascular structures and advanced into the primary defect. Care was taken to ensure that precise realignment of the vermilion border was achieved. Peng Advancement Flap Text: The defect edges were debeveled with a #15 scalpel blade.  Given the location of the defect, shape of the defect and the proximity to free margins a Peng advancement flap was deemed most appropriate.  Using a sterile surgical marker, an appropriate advancement flap was drawn incorporating the defect and placing the expected incisions within the relaxed skin tension lines where possible. The area thus outlined was incised deep to adipose tissue with a #15 scalpel blade.  The skin margins were undermined to an appropriate distance in all directions utilizing iris scissors. Hatchet Flap Text: The defect edges were debeveled with a #15 scalpel blade.  Given the location of the defect, shape of the defect and the proximity to free margins a hatchet flap was deemed most appropriate.  Using a sterile surgical marker, an appropriate hatchet flap was drawn incorporating the defect and placing the expected incisions within the relaxed skin tension lines where possible.    The area thus outlined was incised deep to adipose tissue with a #15 scalpel blade.  The skin margins were undermined to an appropriate distance in all directions utilizing iris scissors. Rotation Flap Text: The defect edges were debeveled with a #15 scalpel blade.  Given the location of the defect, shape of the defect and the proximity to free margins a rotation flap was deemed most appropriate.  Using a sterile surgical marker, an appropriate rotation flap was drawn incorporating the defect and placing the expected incisions within the relaxed skin tension lines where possible.    The area thus outlined was incised deep to adipose tissue with a #15 scalpel blade.  The skin margins were undermined to an appropriate distance in all directions utilizing iris scissors. Spiral Flap Text: The defect edges were debeveled with a #15 scalpel blade.  Given the location of the defect, shape of the defect and the proximity to free margins a spiral flap was deemed most appropriate.  Using a sterile surgical marker, an appropriate rotation flap was drawn incorporating the defect and placing the expected incisions within the relaxed skin tension lines where possible. The area thus outlined was incised deep to adipose tissue with a #15 scalpel blade.  The skin margins were undermined to an appropriate distance in all directions utilizing iris scissors. Staged Advancement Flap Text: The defect edges were debeveled with a #15 scalpel blade.  Given the location of the defect, shape of the defect and the proximity to free margins a staged advancement flap was deemed most appropriate.  Using a sterile surgical marker, an appropriate advancement flap was drawn incorporating the defect and placing the expected incisions within the relaxed skin tension lines where possible. The area thus outlined was incised deep to adipose tissue with a #15 scalpel blade.  The skin margins were undermined to an appropriate distance in all directions utilizing iris scissors. Star Wedge Flap Text: The defect edges were debeveled with a #15 scalpel blade.  Given the location of the defect, shape of the defect and the proximity to free margins a star wedge flap was deemed most appropriate.  Using a sterile surgical marker, an appropriate rotation flap was drawn incorporating the defect and placing the expected incisions within the relaxed skin tension lines where possible. The area thus outlined was incised deep to adipose tissue with a #15 scalpel blade.  The skin margins were undermined to an appropriate distance in all directions utilizing iris scissors. Transposition Flap Text: The defect edges were debeveled with a #15 scalpel blade.  Given the location of the defect and the proximity to free margins a transposition flap was deemed most appropriate.  Using a sterile surgical marker, an appropriate transposition flap was drawn incorporating the defect.    The area thus outlined was incised deep to adipose tissue with a #15 scalpel blade.  The skin margins were undermined to an appropriate distance in all directions utilizing iris scissors. Muscle Hinge Flap Text: The defect edges were debeveled with a #15 scalpel blade.  Given the size, depth and location of the defect and the proximity to free margins a muscle hinge flap was deemed most appropriate.  Using a sterile surgical marker, an appropriate hinge flap was drawn incorporating the defect. The area thus outlined was incised with a #15 scalpel blade.  The skin margins were undermined to an appropriate distance in all directions utilizing iris scissors. Mustarde Flap Text: The defect edges were debeveled with a #15 scalpel blade.  Given the size, depth and location of the defect and the proximity to free margins a Mustarde flap was deemed most appropriate.  Using a sterile surgical marker, an appropriate flap was drawn incorporating the defect. The area thus outlined was incised with a #15 scalpel blade.  The skin margins were undermined to an appropriate distance in all directions utilizing iris scissors. Nasal Turnover Hinge Flap Text: The defect edges were debeveled with a #15 scalpel blade.  Given the size, depth, location of the defect and the defect being full thickness a nasal turnover hinge flap was deemed most appropriate.  Using a sterile surgical marker, an appropriate hinge flap was drawn incorporating the defect. The area thus outlined was incised with a #15 scalpel blade. The flap was designed to recreate the nasal mucosal lining and the alar rim. The skin margins were undermined to an appropriate distance in all directions utilizing iris scissors. Nasalis-Muscle-Based Myocutaneous Island Pedicle Flap Text: Using a #15 blade, an incision was made around the donor flap to the level of the nasalis muscle. Wide lateral undermining was then performed in both the subcutaneous plane above the nasalis muscle, and in a submuscular plane just above periosteum. This allowed the formation of a free nasalis muscle axial pedicle (based on the angular artery) which was still attached to the actual cutaneous flap, increasing its mobility and vascular viability. Hemostasis was obtained with pinpoint electrocoagulation. The flap was mobilized into position and the pivotal anchor points positioned and stabilized with buried interrupted sutures. Subcutaneous and dermal tissues were closed in a multilayered fashion with sutures. Tissue redundancies were excised, and the epidermal edges were apposed without significant tension and sutured with sutures. Orbicularis Oris Muscle Flap Text: The defect edges were debeveled with a #15 scalpel blade.  Given that the defect affected the competency of the oral sphincter an orbicularis oris muscle flap was deemed most appropriate to restore this competency and normal muscle function.  Using a sterile surgical marker, an appropriate flap was drawn incorporating the defect. The area thus outlined was incised with a #15 scalpel blade. Melolabial Transposition Flap Text: The defect edges were debeveled with a #15 scalpel blade.  Given the location of the defect and the proximity to free margins a melolabial flap was deemed most appropriate.  Using a sterile surgical marker, an appropriate melolabial transposition flap was drawn incorporating the defect.    The area thus outlined was incised deep to adipose tissue with a #15 scalpel blade.  The skin margins were undermined to an appropriate distance in all directions utilizing iris scissors. Rhombic Flap Text: The defect edges were debeveled with a #15 scalpel blade.  Given the location of the defect and the proximity to free margins a rhombic flap was deemed most appropriate.  Using a sterile surgical marker, an appropriate rhombic flap was drawn incorporating the defect.    The area thus outlined was incised deep to adipose tissue with a #15 scalpel blade.  The skin margins were undermined to an appropriate distance in all directions utilizing iris scissors. Rhomboid Transposition Flap Text: The defect edges were debeveled with a #15 scalpel blade.  Given the location of the defect and the proximity to free margins a rhomboid transposition flap was deemed most appropriate.  Using a sterile surgical marker, an appropriate rhomboid flap was drawn incorporating the defect.    The area thus outlined was incised deep to adipose tissue with a #15 scalpel blade.  The skin margins were undermined to an appropriate distance in all directions utilizing iris scissors. Bi-Rhombic Flap Text: The defect edges were debeveled with a #15 scalpel blade.  Given the location of the defect and the proximity to free margins a bi-rhombic flap was deemed most appropriate.  Using a sterile surgical marker, an appropriate rhombic flap was drawn incorporating the defect. The area thus outlined was incised deep to adipose tissue with a #15 scalpel blade.  The skin margins were undermined to an appropriate distance in all directions utilizing iris scissors. Helical Rim Advancement Flap Text: The defect edges were debeveled with a #15 blade scalpel.  Given the location of the defect and the proximity to free margins (helical rim) a double helical rim advancement flap was deemed most appropriate.  Using a sterile surgical marker, the appropriate advancement flaps were drawn incorporating the defect and placing the expected incisions between the helical rim and antihelix where possible.  The area thus outlined was incised through and through with a #15 scalpel blade.  With a skin hook and iris scissors, the flaps were gently and sharply undermined and freed up. Bilateral Helical Rim Advancement Flap Text: The defect edges were debeveled with a #15 blade scalpel.  Given the location of the defect and the proximity to free margins (helical rim) a bilateral helical rim advancement flap was deemed most appropriate.  Using a sterile surgical marker, the appropriate advancement flaps were drawn incorporating the defect and placing the expected incisions between the helical rim and antihelix where possible.  The area thus outlined was incised through and through with a #15 scalpel blade.  With a skin hook and iris scissors, the flaps were gently and sharply undermined and freed up. Ear Star Wedge Flap Text: The defect edges were debeveled with a #15 blade scalpel.  Given the location of the defect and the proximity to free margins (helical rim) an ear star wedge flap was deemed most appropriate.  Using a sterile surgical marker, the appropriate flap was drawn incorporating the defect and placing the expected incisions between the helical rim and antihelix where possible.  The area thus outlined was incised through and through with a #15 scalpel blade. Banner Transposition Flap Text: The defect edges were debeveled with a #15 scalpel blade.  Given the location of the defect and the proximity to free margins a Banner transposition flap was deemed most appropriate.  Using a sterile surgical marker, an appropriate flap drawn around the defect. The area thus outlined was incised deep to adipose tissue with a #15 scalpel blade.  The skin margins were undermined to an appropriate distance in all directions utilizing iris scissors. Bilobed Flap Text: The defect edges were debeveled with a #15 scalpel blade.  Given the location of the defect and the proximity to free margins a bilobe flap was deemed most appropriate.  Using a sterile surgical marker, an appropriate bilobe flap drawn around the defect.    The area thus outlined was incised deep to adipose tissue with a #15 scalpel blade.  The skin margins were undermined to an appropriate distance in all directions utilizing iris scissors. Bilobed Transposition Flap Text: The defect edges were debeveled with a #15 scalpel blade.  Given the location of the defect and the proximity to free margins a bilobed transposition flap was deemed most appropriate.  Using a sterile surgical marker, an appropriate bilobe flap drawn around the defect.    The area thus outlined was incised deep to adipose tissue with a #15 scalpel blade.  The skin margins were undermined to an appropriate distance in all directions utilizing iris scissors. Trilobed Flap Text: The defect edges were debeveled with a #15 scalpel blade.  Given the location of the defect and the proximity to free margins a trilobed flap was deemed most appropriate.  Using a sterile surgical marker, an appropriate trilobed flap drawn around the defect.    The area thus outlined was incised deep to adipose tissue with a #15 scalpel blade.  The skin margins were undermined to an appropriate distance in all directions utilizing iris scissors. Dorsal Nasal Flap Text: The defect edges were debeveled with a #15 scalpel blade.  Given the location of the defect and the proximity to free margins a dorsal nasal flap was deemed most appropriate.  Using a sterile surgical marker, an appropriate dorsal nasal flap was drawn around the defect.    The area thus outlined was incised deep to adipose tissue with a #15 scalpel blade.  The skin margins were undermined to an appropriate distance in all directions utilizing iris scissors. Island Pedicle Flap Text: The defect edges were debeveled with a #15 scalpel blade.  Given the location of the defect, shape of the defect and the proximity to free margins an island pedicle advancement flap was deemed most appropriate.  Using a sterile surgical marker, an appropriate advancement flap was drawn incorporating the defect, outlining the appropriate donor tissue and placing the expected incisions within the relaxed skin tension lines where possible.    The area thus outlined was incised deep to adipose tissue with a #15 scalpel blade.  The skin margins were undermined to an appropriate distance in all directions around the primary defect and laterally outward around the island pedicle utilizing iris scissors.  There was minimal undermining beneath the pedicle flap. Island Pedicle Flap With Canthal Suspension Text: The defect edges were debeveled with a #15 scalpel blade.  Given the location of the defect, shape of the defect and the proximity to free margins an island pedicle advancement flap was deemed most appropriate.  Using a sterile surgical marker, an appropriate advancement flap was drawn incorporating the defect, outlining the appropriate donor tissue and placing the expected incisions within the relaxed skin tension lines where possible. The area thus outlined was incised deep to adipose tissue with a #15 scalpel blade.  The skin margins were undermined to an appropriate distance in all directions around the primary defect and laterally outward around the island pedicle utilizing iris scissors.  There was minimal undermining beneath the pedicle flap. A suspension suture was placed in the canthal tendon to prevent tension and prevent ectropion. Alar Island Pedicle Flap Text: The defect edges were debeveled with a #15 scalpel blade.  Given the location of the defect, shape of the defect and the proximity to the alar rim an island pedicle advancement flap was deemed most appropriate.  Using a sterile surgical marker, an appropriate advancement flap was drawn incorporating the defect, outlining the appropriate donor tissue and placing the expected incisions within the nasal ala running parallel to the alar rim. The area thus outlined was incised with a #15 scalpel blade.  The skin margins were undermined minimally to an appropriate distance in all directions around the primary defect and laterally outward around the island pedicle utilizing iris scissors.  There was minimal undermining beneath the pedicle flap. Double Island Pedicle Flap Text: The defect edges were debeveled with a #15 scalpel blade.  Given the location of the defect, shape of the defect and the proximity to free margins a double island pedicle advancement flap was deemed most appropriate.  Using a sterile surgical marker, an appropriate advancement flap was drawn incorporating the defect, outlining the appropriate donor tissue and placing the expected incisions within the relaxed skin tension lines where possible.    The area thus outlined was incised deep to adipose tissue with a #15 scalpel blade.  The skin margins were undermined to an appropriate distance in all directions around the primary defect and laterally outward around the island pedicle utilizing iris scissors.  There was minimal undermining beneath the pedicle flap. Island Pedicle Flap-Requiring Vessel Identification Text: The defect edges were debeveled with a #15 scalpel blade.  Given the location of the defect, shape of the defect and the proximity to free margins an island pedicle advancement flap was deemed most appropriate.  Using a sterile surgical marker, an appropriate advancement flap was drawn, based on the axial vessel mentioned above, incorporating the defect, outlining the appropriate donor tissue and placing the expected incisions within the relaxed skin tension lines where possible.    The area thus outlined was incised deep to adipose tissue with a #15 scalpel blade.  The skin margins were undermined to an appropriate distance in all directions around the primary defect and laterally outward around the island pedicle utilizing iris scissors.  There was minimal undermining beneath the pedicle flap. Keystone Flap Text: The defect edges were debeveled with a #15 scalpel blade.  Given the location of the defect, shape of the defect a keystone flap was deemed most appropriate.  Using a sterile surgical marker, an appropriate keystone flap was drawn incorporating the defect, outlining the appropriate donor tissue and placing the expected incisions within the relaxed skin tension lines where possible. The area thus outlined was incised deep to adipose tissue with a #15 scalpel blade.  The skin margins were undermined to an appropriate distance in all directions around the primary defect and laterally outward around the flap utilizing iris scissors. O-T Plasty Text: The defect edges were debeveled with a #15 scalpel blade.  Given the location of the defect, shape of the defect and the proximity to free margins an O-T plasty was deemed most appropriate.  Using a sterile surgical marker, an appropriate O-T plasty was drawn incorporating the defect and placing the expected incisions within the relaxed skin tension lines where possible.    The area thus outlined was incised deep to adipose tissue with a #15 scalpel blade.  The skin margins were undermined to an appropriate distance in all directions utilizing iris scissors. O-Z Plasty Text: The defect edges were debeveled with a #15 scalpel blade.  Given the location of the defect, shape of the defect and the proximity to free margins an O-Z plasty (double transposition flap) was deemed most appropriate.  Using a sterile surgical marker, the appropriate transposition flaps were drawn incorporating the defect and placing the expected incisions within the relaxed skin tension lines where possible.    The area thus outlined was incised deep to adipose tissue with a #15 scalpel blade.  The skin margins were undermined to an appropriate distance in all directions utilizing iris scissors.  Hemostasis was achieved with electrocautery.  The flaps were then transposed into place, one clockwise and the other counterclockwise, and anchored with interrupted buried subcutaneous sutures. Double O-Z Plasty Text: The defect edges were debeveled with a #15 scalpel blade.  Given the location of the defect, shape of the defect and the proximity to free margins a Double O-Z plasty (double transposition flap) was deemed most appropriate.  Using a sterile surgical marker, the appropriate transposition flaps were drawn incorporating the defect and placing the expected incisions within the relaxed skin tension lines where possible. The area thus outlined was incised deep to adipose tissue with a #15 scalpel blade.  The skin margins were undermined to an appropriate distance in all directions utilizing iris scissors.  Hemostasis was achieved with electrocautery.  The flaps were then transposed into place, one clockwise and the other counterclockwise, and anchored with interrupted buried subcutaneous sutures. V-Y Plasty Text: The defect edges were debeveled with a #15 scalpel blade.  Given the location of the defect, shape of the defect and the proximity to free margins an V-Y advancement flap was deemed most appropriate.  Using a sterile surgical marker, an appropriate advancement flap was drawn incorporating the defect and placing the expected incisions within the relaxed skin tension lines where possible.    The area thus outlined was incised deep to adipose tissue with a #15 scalpel blade.  The skin margins were undermined to an appropriate distance in all directions utilizing iris scissors. H Plasty Text: Given the location of the defect, shape of the defect and the proximity to free margins a H-plasty was deemed most appropriate for repair.  Using a sterile surgical marker, the appropriate advancement arms of the H-plasty were drawn incorporating the defect and placing the expected incisions within the relaxed skin tension lines where possible. The area thus outlined was incised deep to adipose tissue with a #15 scalpel blade. The skin margins were undermined to an appropriate distance in all directions utilizing iris scissors.  The opposing advancement arms were then advanced into place in opposite direction and anchored with interrupted buried subcutaneous sutures. W Plasty Text: The lesion was extirpated to the level of the fat with a #15 scalpel blade.  Given the location of the defect, shape of the defect and the proximity to free margins a W-plasty was deemed most appropriate for repair.  Using a sterile surgical marker, the appropriate transposition arms of the W-plasty were drawn incorporating the defect and placing the expected incisions within the relaxed skin tension lines where possible.    The area thus outlined was incised deep to adipose tissue with a #15 scalpel blade.  The skin margins were undermined to an appropriate distance in all directions utilizing iris scissors.  The opposing transposition arms were then transposed into place in opposite direction and anchored with interrupted buried subcutaneous sutures. Z Plasty Text: The lesion was extirpated to the level of the fat with a #15 scalpel blade.  Given the location of the defect, shape of the defect and the proximity to free margins a Z-plasty was deemed most appropriate for repair.  Using a sterile surgical marker, the appropriate transposition arms of the Z-plasty were drawn incorporating the defect and placing the expected incisions within the relaxed skin tension lines where possible.    The area thus outlined was incised deep to adipose tissue with a #15 scalpel blade.  The skin margins were undermined to an appropriate distance in all directions utilizing iris scissors.  The opposing transposition arms were then transposed into place in opposite direction and anchored with interrupted buried subcutaneous sutures. Zygomaticofacial Flap Text: Given the location of the defect, shape of the defect and the proximity to free margins a zygomaticofacial flap was deemed most appropriate for repair.  Using a sterile surgical marker, the appropriate flap was drawn incorporating the defect and placing the expected incisions within the relaxed skin tension lines where possible. The area thus outlined was incised deep to adipose tissue with a #15 scalpel blade with preservation of a vascular pedicle.  The skin margins were undermined to an appropriate distance in all directions utilizing iris scissors.  The flap was then placed into the defect and anchored with interrupted buried subcutaneous sutures. Cheek Interpolation Flap Text: A decision was made to reconstruct the defect utilizing an interpolation axial flap and a staged reconstruction.  A telfa template was made of the defect.  This telfa template was then used to outline the Cheek Interpolation flap.  The donor area for the pedicle flap was then injected with anesthesia.  The flap was excised through the skin and subcutaneous tissue down to the layer of the underlying musculature.  The interpolation flap was carefully excised within this deep plane to maintain its blood supply.  The edges of the donor site were undermined.   The donor site was closed in a primary fashion.  The pedicle was then rotated into position and sutured.  Once the tube was sutured into place, adequate blood supply was confirmed with blanching and refill.  The pedicle was then wrapped with xeroform gauze and dressed appropriately with a telfa and gauze bandage to ensure continued blood supply and protect the attached pedicle. Cheek-To-Nose Interpolation Flap Text: A decision was made to reconstruct the defect utilizing an interpolation axial flap and a staged reconstruction.  A telfa template was made of the defect.  This telfa template was then used to outline the Cheek-To-Nose Interpolation flap.  The donor area for the pedicle flap was then injected with anesthesia.  The flap was excised through the skin and subcutaneous tissue down to the layer of the underlying musculature.  The interpolation flap was carefully excised within this deep plane to maintain its blood supply.  The edges of the donor site were undermined.   The donor site was closed in a primary fashion.  The pedicle was then rotated into position and sutured.  Once the tube was sutured into place, adequate blood supply was confirmed with blanching and refill.  The pedicle was then wrapped with xeroform gauze and dressed appropriately with a telfa and gauze bandage to ensure continued blood supply and protect the attached pedicle. Interpolation Flap Text: A decision was made to reconstruct the defect utilizing an interpolation axial flap and a staged reconstruction.  A telfa template was made of the defect.  This telfa template was then used to outline the interpolation flap.  The donor area for the pedicle flap was then injected with anesthesia.  The flap was excised through the skin and subcutaneous tissue down to the layer of the underlying musculature.  The interpolation flap was carefully excised within this deep plane to maintain its blood supply.  The edges of the donor site were undermined.   The donor site was closed in a primary fashion.  The pedicle was then rotated into position and sutured.  Once the tube was sutured into place, adequate blood supply was confirmed with blanching and refill.  The pedicle was then wrapped with xeroform gauze and dressed appropriately with a telfa and gauze bandage to ensure continued blood supply and protect the attached pedicle. Melolabial Interpolation Flap Text: A decision was made to reconstruct the defect utilizing an interpolation axial flap and a staged reconstruction.  A telfa template was made of the defect.  This telfa template was then used to outline the melolabial interpolation flap.  The donor area for the pedicle flap was then injected with anesthesia.  The flap was excised through the skin and subcutaneous tissue down to the layer of the underlying musculature.  The pedicle flap was carefully excised within this deep plane to maintain its blood supply.  The edges of the donor site were undermined.   The donor site was closed in a primary fashion.  The pedicle was then rotated into position and sutured.  Once the tube was sutured into place, adequate blood supply was confirmed with blanching and refill.  The pedicle was then wrapped with xeroform gauze and dressed appropriately with a telfa and gauze bandage to ensure continued blood supply and protect the attached pedicle. Mastoid Interpolation Flap Text: A decision was made to reconstruct the defect utilizing an interpolation axial flap and a staged reconstruction.  A telfa template was made of the defect.  This telfa template was then used to outline the mastoid interpolation flap.  The donor area for the pedicle flap was then injected with anesthesia.  The flap was excised through the skin and subcutaneous tissue down to the layer of the underlying musculature.  The pedicle flap was carefully excised within this deep plane to maintain its blood supply.  The edges of the donor site were undermined.   The donor site was closed in a primary fashion.  The pedicle was then rotated into position and sutured.  Once the tube was sutured into place, adequate blood supply was confirmed with blanching and refill.  The pedicle was then wrapped with xeroform gauze and dressed appropriately with a telfa and gauze bandage to ensure continued blood supply and protect the attached pedicle. Posterior Auricular Interpolation Flap Text: A decision was made to reconstruct the defect utilizing an interpolation axial flap and a staged reconstruction.  A telfa template was made of the defect.  This telfa template was then used to outline the posterior auricular interpolation flap.  The donor area for the pedicle flap was then injected with anesthesia.  The flap was excised through the skin and subcutaneous tissue down to the layer of the underlying musculature.  The pedicle flap was carefully excised within this deep plane to maintain its blood supply.  The edges of the donor site were undermined.   The donor site was closed in a primary fashion.  The pedicle was then rotated into position and sutured.  Once the tube was sutured into place, adequate blood supply was confirmed with blanching and refill.  The pedicle was then wrapped with xeroform gauze and dressed appropriately with a telfa and gauze bandage to ensure continued blood supply and protect the attached pedicle. Paramedian Forehead Flap Text: A decision was made to reconstruct the defect utilizing an interpolation axial flap and a staged reconstruction.  A telfa template was made of the defect.  This telfa template was then used to outline the paramedian forehead pedicle flap.  The donor area for the pedicle flap was then injected with anesthesia.  The flap was excised through the skin and subcutaneous tissue down to the layer of the underlying musculature.  The pedicle flap was carefully excised within this deep plane to maintain its blood supply.  The edges of the donor site were undermined.   The donor site was closed in a primary fashion.  The pedicle was then rotated into position and sutured.  Once the tube was sutured into place, adequate blood supply was confirmed with blanching and refill.  The pedicle was then wrapped with xeroform gauze and dressed appropriately with a telfa and gauze bandage to ensure continued blood supply and protect the attached pedicle. Cheiloplasty (Less Than 50%) Text: A decision was made to reconstruct the defect with a  cheiloplasty.  The defect was undermined extensively.  Additional orbicularis oris muscle was excised with a 15 blade scalpel.  The defect was converted into a full thickness wedge, of less than 50% of the vertical height of the lip, to facilite a better cosmetic result.  Small vessels were then tied off with 5-0 monocyrl. The orbicularis oris, superficial fascia, adipose and dermis were then reapproximated.  After the deeper layers were approximated the epidermis was reapproximated with particular care given to realign the vermilion border. Cheiloplasty (Complex) Text: A decision was made to reconstruct the defect with a  cheiloplasty.  The defect was undermined extensively.  Additional orbicularis oris muscle was excised with a 15 blade scalpel.  The defect was converted into a full thickness wedge to facilite a better cosmetic result.  Small vessels were then tied off with 5-0 monocyrl. The orbicularis oris, superficial fascia, adipose and dermis were then reapproximated.  After the deeper layers were approximated the epidermis was reapproximated with particular care given to realign the vermilion border. Ear Wedge Repair Text: A wedge excision was completed by carrying down an excision through the full thickness of the ear and cartilage with an inward facing Burow's triangle. The wound was then closed in a layered fashion. Full Thickness Lip Wedge Repair (Flap) Text: Given the location of the defect and the proximity to free margins a full thickness wedge repair was deemed most appropriate.  Using a sterile surgical marker, the appropriate repair was drawn incorporating the defect and placing the expected incisions perpendicular to the vermilion border.  The vermilion border was also meticulously outlined to ensure appropriate reapproximation during the repair.  The area thus outlined was incised through and through with a #15 scalpel blade.  The muscularis and dermis were reaproximated with deep sutures following hemostasis. Care was taken to realign the vermilion border before proceeding with the superficial closure.  Once the vermilion was realigned the superfical and mucosal closure was finished. Ftsg Text: The defect edges were debeveled with a #15 scalpel blade.  Given the location of the defect, shape of the defect and the proximity to free margins a full thickness skin graft was deemed most appropriate.  Using a sterile surgical marker, the primary defect shape was transferred to the donor site. The area thus outlined was incised deep to adipose tissue with a #15 scalpel blade.  The harvested graft was then trimmed of adipose tissue until only dermis and epidermis was left.  The skin margins of the secondary defect were undermined to an appropriate distance in all directions utilizing iris scissors.  The secondary defect was closed with interrupted buried subcutaneous sutures.  The skin edges were then re-apposed with running  sutures.  The skin graft was then placed in the primary defect and oriented appropriately. Split-Thickness Skin Graft Text: The defect edges were debeveled with a #15 scalpel blade.  Given the location of the defect, shape of the defect and the proximity to free margins a split thickness skin graft was deemed most appropriate.  Using a sterile surgical marker, the primary defect shape was transferred to the donor site. The split thickness graft was then harvested.  The skin graft was then placed in the primary defect and oriented appropriately. Burow's Graft Text: The defect edges were debeveled with a #15 scalpel blade.  Given the location of the defect, shape of the defect, the proximity to free margins and the presence of a standing cone deformity a Burow's skin graft was deemed most appropriate. The standing cone was removed and this tissue was then trimmed to the shape of the primary defect. The adipose tissue was also removed until only dermis and epidermis were left.  The skin margins of the secondary defect were undermined to an appropriate distance in all directions utilizing iris scissors.  The secondary defect was closed with interrupted buried subcutaneous sutures.  The skin edges were then re-apposed with running  sutures.  The skin graft was then placed in the primary defect and oriented appropriately. Cartilage Graft Text: The defect edges were debeveled with a #15 scalpel blade.  Given the location of the defect, shape of the defect, the fact the defect involved a full thickness cartilage defect a cartilage graft was deemed most appropriate.  An appropriate donor site was identified, cleansed, and anesthetized. The cartilage graft was then harvested and transferred to the recipient site, oriented appropriately and then sutured into place.  The secondary defect was then repaired using a primary closure. Composite Graft Text: The defect edges were debeveled with a #15 scalpel blade.  Given the location of the defect, shape of the defect, the proximity to free margins and the fact the defect was full thickness a composite graft was deemed most appropriate.  The defect was outline and then transferred to the donor site.  A full thickness graft was then excised from the donor site. The graft was then placed in the primary defect, oriented appropriately and then sutured into place.  The secondary defect was then repaired using a primary closure. Epidermal Autograft Text: The defect edges were debeveled with a #15 scalpel blade.  Given the location of the defect, shape of the defect and the proximity to free margins an epidermal autograft was deemed most appropriate.  Using a sterile surgical marker, the primary defect shape was transferred to the donor site. The epidermal graft was then harvested.  The skin graft was then placed in the primary defect and oriented appropriately. Dermal Autograft Text: The defect edges were debeveled with a #15 scalpel blade.  Given the location of the defect, shape of the defect and the proximity to free margins a dermal autograft was deemed most appropriate.  Using a sterile surgical marker, the primary defect shape was transferred to the donor site. The area thus outlined was incised deep to adipose tissue with a #15 scalpel blade.  The harvested graft was then trimmed of adipose and epidermal tissue until only dermis was left.  The skin graft was then placed in the primary defect and oriented appropriately. Skin Substitute Text: The defect edges were debeveled with a #15 scalpel blade.  Given the location of the defect, shape of the defect and the proximity to free margins a skin substitute graft was deemed most appropriate.  The graft material was trimmed to fit the size of the defect. The graft was then placed in the primary defect and oriented appropriately. Tissue Cultured Epidermal Autograft Text: The defect edges were debeveled with a #15 scalpel blade.  Given the location of the defect, shape of the defect and the proximity to free margins a tissue cultured epidermal autograft was deemed most appropriate.  The graft was then trimmed to fit the size of the defect.  The graft was then placed in the primary defect and oriented appropriately. Xenograft Text: The defect edges were debeveled with a #15 scalpel blade.  Given the location of the defect, shape of the defect and the proximity to free margins a xenograft was deemed most appropriate.  The graft was then trimmed to fit the size of the defect.  The graft was then placed in the primary defect and oriented appropriately. Purse String (Simple) Text: Given the location of the defect and the characteristics of the surrounding skin a purse string closure was deemed most appropriate.  Undermining was performed circumfirentially around the surgical defect.  A purse string suture was then placed and tightened. Purse String (Intermediate) Text: Given the location of the defect and the characteristics of the surrounding skin a purse string intermediate closure was deemed most appropriate.  Undermining was performed circumfirentially around the surgical defect.  A purse string suture was then placed and tightened. Partial Purse String (Simple) Text: Given the location of the defect and the characteristics of the surrounding skin a simple purse string closure was deemed most appropriate.  Undermining was performed circumfirentially around the surgical defect.  A purse string suture was then placed and tightened. Wound tension only allowed a partial closure of the circular defect. Partial Purse String (Intermediate) Text: Given the location of the defect and the characteristics of the surrounding skin an intermediate purse string closure was deemed most appropriate.  Undermining was performed circumfirentially around the surgical defect.  A purse string suture was then placed and tightened. Wound tension only allowed a partial closure of the circular defect. Localized Dermabrasion With Wire Brush Text: The patient was draped in routine manner.  Localized dermabrasion using 3 x 17 mm wire brush was performed in routine manner to papillary dermis. This spot dermabrasion is being performed to complete skin cancer reconstruction. It also will eliminate the other sun damaged precancerous cells that are known to be part of the regional effect of a lifetime's worth of sun exposure. This localized dermabrasion is therapeutic and should not be considered cosmetic in any regard. Tarsorrhaphy Text: A tarsorrhaphy was performed using Frost sutures. Complex Repair And Flap Additional Text (Will Appearing After The Standard Complex Repair Text): The complex repair was not sufficient to completely close the primary defect. The remaining additional defect was repaired with the flap mentioned below. Complex Repair And Graft Additional Text (Will Appearing After The Standard Complex Repair Text): The complex repair was not sufficient to completely close the primary defect. The remaining additional defect was repaired with the graft mentioned below. Manual Repair Warning Statement: We plan on removing the manually selected variable below in favor of our much easier automatic structured text blocks found in the previous tab. We decided to do this to help make the flow better and give you the full power of structured data. Manual selection is never going to be ideal in our platform and I would encourage you to avoid using manual selection from this point on, especially since I will be sunsetting this feature. It is important that you do one of two things with the customized text below. First, you can save all of the text in a word file so you can have it for future reference. Second, transfer the text to the appropriate area in the Library tab. Lastly, if there is a flap or graft type which we do not have you need to let us know right away so I can add it in before the variable is hidden. No need to panic, we plan to give you roughly 6 months to make the change. Same Histology In Subsequent Stages Text: The pattern and morphology of the tumor is as described in the first stage. No Residual Tumor Seen Histology Text: There were no malignant cells seen in the sections examined. Inflammation Suggestive Of Cancer Camouflage Histology Text: There was a dense lymphocytic infiltrate which prevented adequate histologic evaluation of adjacent structures. Bcc Histology Text: There were numerous aggregates of basaloid cells. Bcc Infiltrative Histology Text: There were numerous aggregates of basaloid cells demonstrating an infiltrative pattern. Mart-1 - Positive Histology Text: MART-1 staining demonstrates areas of higher density and clustering of melanocytes with Pagetoid spread upwards within the epidermis. The surgical margins are positive for tumor cells. Mart-1 - Negative Histology Text: MART-1 staining demonstrates a normal density and pattern of melanocytes along the dermal-epidermal junction. The surgical margins are negative for tumor cells. ia Id #: [id number] Information: Selecting Yes will display possible errors in your note based on the variables you have selected. This validation is only offered as a suggestion for you. PLEASE NOTE THAT THE VALIDATION TEXT WILL BE REMOVED WHEN YOU FINALIZE YOUR NOTE. IF YOU WANT TO FAX A PRELIMINARY NOTE YOU WILL NEED TO TOGGLE THIS TO 'NO' IF YOU DO NOT WANT IT IN YOUR FAXED NOTE.

## 2022-05-22 ENCOUNTER — EMERGENCY (EMERGENCY)
Facility: HOSPITAL | Age: 62
LOS: 1 days | Discharge: HOME | End: 2022-05-22
Attending: PERSONAL EMERGENCY RESPONSE ATTENDANT | Admitting: PERSONAL EMERGENCY RESPONSE ATTENDANT
Payer: MEDICAID

## 2022-05-22 VITALS
SYSTOLIC BLOOD PRESSURE: 128 MMHG | HEIGHT: 64 IN | HEART RATE: 88 BPM | TEMPERATURE: 98 F | RESPIRATION RATE: 16 BRPM | OXYGEN SATURATION: 100 % | DIASTOLIC BLOOD PRESSURE: 67 MMHG

## 2022-05-22 VITALS
TEMPERATURE: 98 F | RESPIRATION RATE: 17 BRPM | DIASTOLIC BLOOD PRESSURE: 63 MMHG | OXYGEN SATURATION: 100 % | HEART RATE: 57 BPM | SYSTOLIC BLOOD PRESSURE: 106 MMHG

## 2022-05-22 DIAGNOSIS — Z90.49 ACQUIRED ABSENCE OF OTHER SPECIFIED PARTS OF DIGESTIVE TRACT: Chronic | ICD-10-CM

## 2022-05-22 LAB
ALBUMIN SERPL ELPH-MCNC: 4.7 G/DL — SIGNIFICANT CHANGE UP (ref 3.3–5)
ALP SERPL-CCNC: 87 U/L — SIGNIFICANT CHANGE UP (ref 40–120)
ALT FLD-CCNC: 23 U/L — SIGNIFICANT CHANGE UP (ref 4–33)
ANION GAP SERPL CALC-SCNC: 10 MMOL/L — SIGNIFICANT CHANGE UP (ref 7–14)
AST SERPL-CCNC: 26 U/L — SIGNIFICANT CHANGE UP (ref 4–32)
BASOPHILS # BLD AUTO: 0.04 K/UL — SIGNIFICANT CHANGE UP (ref 0–0.2)
BASOPHILS NFR BLD AUTO: 0.7 % — SIGNIFICANT CHANGE UP (ref 0–2)
BILIRUB SERPL-MCNC: 0.9 MG/DL — SIGNIFICANT CHANGE UP (ref 0.2–1.2)
BUN SERPL-MCNC: 8 MG/DL — SIGNIFICANT CHANGE UP (ref 7–23)
CALCIUM SERPL-MCNC: 9.4 MG/DL — SIGNIFICANT CHANGE UP (ref 8.4–10.5)
CHLORIDE SERPL-SCNC: 105 MMOL/L — SIGNIFICANT CHANGE UP (ref 98–107)
CO2 SERPL-SCNC: 26 MMOL/L — SIGNIFICANT CHANGE UP (ref 22–31)
CREAT SERPL-MCNC: 0.45 MG/DL — LOW (ref 0.5–1.3)
EGFR: 109 ML/MIN/1.73M2 — SIGNIFICANT CHANGE UP
EOSINOPHIL # BLD AUTO: 0.07 K/UL — SIGNIFICANT CHANGE UP (ref 0–0.5)
EOSINOPHIL NFR BLD AUTO: 1.2 % — SIGNIFICANT CHANGE UP (ref 0–6)
GLUCOSE SERPL-MCNC: 105 MG/DL — HIGH (ref 70–99)
HCT VFR BLD CALC: 39.2 % — SIGNIFICANT CHANGE UP (ref 34.5–45)
HGB BLD-MCNC: 12.9 G/DL — SIGNIFICANT CHANGE UP (ref 11.5–15.5)
IANC: 3.94 K/UL — SIGNIFICANT CHANGE UP (ref 1.8–7.4)
IMM GRANULOCYTES NFR BLD AUTO: 0.7 % — SIGNIFICANT CHANGE UP (ref 0–1.5)
LYMPHOCYTES # BLD AUTO: 1.38 K/UL — SIGNIFICANT CHANGE UP (ref 1–3.3)
LYMPHOCYTES # BLD AUTO: 23.7 % — SIGNIFICANT CHANGE UP (ref 13–44)
MAGNESIUM SERPL-MCNC: 2.2 MG/DL — SIGNIFICANT CHANGE UP (ref 1.6–2.6)
MCHC RBC-ENTMCNC: 31.7 PG — SIGNIFICANT CHANGE UP (ref 27–34)
MCHC RBC-ENTMCNC: 32.9 GM/DL — SIGNIFICANT CHANGE UP (ref 32–36)
MCV RBC AUTO: 96.3 FL — SIGNIFICANT CHANGE UP (ref 80–100)
MONOCYTES # BLD AUTO: 0.36 K/UL — SIGNIFICANT CHANGE UP (ref 0–0.9)
MONOCYTES NFR BLD AUTO: 6.2 % — SIGNIFICANT CHANGE UP (ref 2–14)
NEUTROPHILS # BLD AUTO: 3.94 K/UL — SIGNIFICANT CHANGE UP (ref 1.8–7.4)
NEUTROPHILS NFR BLD AUTO: 67.5 % — SIGNIFICANT CHANGE UP (ref 43–77)
NRBC # BLD: 0 /100 WBCS — SIGNIFICANT CHANGE UP
NRBC # FLD: 0 K/UL — SIGNIFICANT CHANGE UP
PHOSPHATE SERPL-MCNC: 3.1 MG/DL — SIGNIFICANT CHANGE UP (ref 2.5–4.5)
PLATELET # BLD AUTO: 257 K/UL — SIGNIFICANT CHANGE UP (ref 150–400)
POTASSIUM SERPL-MCNC: 4.7 MMOL/L — SIGNIFICANT CHANGE UP (ref 3.5–5.3)
POTASSIUM SERPL-SCNC: 4.7 MMOL/L — SIGNIFICANT CHANGE UP (ref 3.5–5.3)
PROT SERPL-MCNC: 7.6 G/DL — SIGNIFICANT CHANGE UP (ref 6–8.3)
RBC # BLD: 4.07 M/UL — SIGNIFICANT CHANGE UP (ref 3.8–5.2)
RBC # FLD: 12 % — SIGNIFICANT CHANGE UP (ref 10.3–14.5)
SODIUM SERPL-SCNC: 141 MMOL/L — SIGNIFICANT CHANGE UP (ref 135–145)
T4 AB SER-ACNC: 8.22 UG/DL — SIGNIFICANT CHANGE UP (ref 5.1–13)
T4/T3 UPTAKE INDEX SERPL: 1.13 TBI — SIGNIFICANT CHANGE UP (ref 0.8–1.3)
TSH SERPL-MCNC: 1.9 UIU/ML — SIGNIFICANT CHANGE UP (ref 0.27–4.2)
WBC # BLD: 5.83 K/UL — SIGNIFICANT CHANGE UP (ref 3.8–10.5)
WBC # FLD AUTO: 5.83 K/UL — SIGNIFICANT CHANGE UP (ref 3.8–10.5)

## 2022-05-22 PROCEDURE — 93010 ELECTROCARDIOGRAM REPORT: CPT

## 2022-05-22 PROCEDURE — 70450 CT HEAD/BRAIN W/O DYE: CPT | Mod: 26,MA

## 2022-05-22 PROCEDURE — 74176 CT ABD & PELVIS W/O CONTRAST: CPT | Mod: 26,MA

## 2022-05-22 PROCEDURE — 71046 X-RAY EXAM CHEST 2 VIEWS: CPT | Mod: 26

## 2022-05-22 PROCEDURE — 99285 EMERGENCY DEPT VISIT HI MDM: CPT

## 2022-05-22 NOTE — ED PROVIDER NOTE - ATTENDING APP SHARED VISIT CONTRIBUTION OF CARE
Attending MD Murillo.  Agree with above.  Pt is a 61 yo female with pmhx of hypothyroidism on synthroid, HTN who presents to ED with multiple complaints since Covid ~1 yr.  Had palpitations at that time that had resolved but now recur intermittently with palpitations, cold hand and feet, pressure to back of head/neck, presyncopal feeling, blurry vision.  Occasionally has blurry vision/fogginess in am that then improves later in the day.  Pt endorses 20 lb unintentional weight loss over the last week.  Saw PMD in February and was told findings non-actionable.  Planned labs, TSH, EKG and reassessment/discussion re: admission vs. discharge for poss rpt outpt echo. Attending MD Murillo.  Agree with above.  Pt is a 63 yo female with pmhx of hypothyroidism on synthroid, HTN who presents to ED with multiple complaints since Covid ~1 yr.  Had palpitations at that time that had resolved but now recur intermittently with palpitations, cold hand and feet, pressure to back of head/neck, presyncopal feeling, blurry vision.  Occasionally has blurry vision/fogginess in am that then improves later in the day.  Pt endorses 20 lb unintentional weight loss over the last week.  Saw PMD in February and was told findings non-actionable.  Planned labs, TSH, EKG and reassessment/discussion re: admission vs. discharge for poss rpt outpt echo.    CTAP added 2/2 precipitous weight loss with feeling of early satiety.  PT states that she's been imaged and told she had a 'big liver?' in the past but doesn't know if there was a formal dx.  Pt is not a heavy alcohol drinker and has no hx of cirrhosis.  Pt's report of precipitous weight loss, morning fatigue, night sweats recently concerning for poss occult oncologic process. Will image to assess for occult pancreatic malignancy.

## 2022-05-22 NOTE — ED PROVIDER NOTE - CPE EDP [facility] NORM
Discharge instructions gone over with patient and he states understanding. Discharged in stable condition, ambulatory, with significant other.    normal...

## 2022-05-22 NOTE — ED PROVIDER NOTE - OBJECTIVE STATEMENT
61 y/o female with pmhx of HTN, hyperthyroidism, presents to ED c/o intermittent palpitations x 1 year. Pt had COVID 1 year ago with palpitations. Pt describes cold hands and feet, b/l occipital headache and neck pain, and chills. Pt c/o foggy vision occurs when wakening in the morning, resolves on its own. States had palpitations in October 2021 and again for the last week. Pt c/o 3-4 episodes per day. Nonsmoker. Pt feels better when she runs her head thru cold water. Pt c/o unintentional 20 lb weight loss of 3 months. Pt feels anxious over her weight loss.   Cardiologist  61 y/o female with pmhx of HTN, hyperthyroidism, presents to ED c/o intermittent palpitations x 1 year. Pt had COVID 1 year ago with palpitations that resolved after infection. Pt describes episodes of blurry vision, palpitations with cold and sweaty hands and feet, feeling pressure in the back of her head and neck x 6 months. Sometimes feels like she wants to pass out. Pt c/o foggy vision occurs when wakening in the morning, resolves on its own. Pt c/o 3-4 episodes per day. Nonsmoker. Pt feels better when she runs her head thru cold water. Pt c/o unintentional 20 lb weight loss of 3 months. Pt feels anxious over her weight loss.   Cardiologist  63 y/o female with pmhx of HTN, hypothyroidism, presents to ED c/o intermittent palpitations x 1 year. Pt had COVID 1 year ago with palpitations that resolved after infection. Pt describes episodes of blurry vision, palpitations with cold and sweaty hands and feet, feeling pressure in the back of her head and neck x 6 months. Sometimes feels like she wants to pass out. Pt c/o foggy vision occurs when wakening in the morning, resolves on its own. Pt c/o about 3 episodes per day for past week. Nonsmoker. Pt feels better when she runs her head thru cold water. Pt c/o unintentional 20 lb weight loss x 1 year. Pt feels anxious over her weight loss. Pt tearful talking about her daughter going thru a divorce. No fevers, cp, sob, cough, abd pain, n/v/d, dysuria. Pt saw her PMD 3 months ago and had normal work up. Pt saw cardiologist 10/2021 and had normal stress test and echo.

## 2022-05-22 NOTE — ED PROVIDER NOTE - NS ED ATTENDING STATEMENT MOD
This was a shared visit with the HERMINIA. I reviewed and verified the documentation and independently performed the documented:

## 2022-05-22 NOTE — ED PROVIDER NOTE - NSFOLLOWUPINSTRUCTIONS_ED_ALL_ED_FT
Follow up with your primary care provider within 48 hours  Take copy of results with you    Worsening, continued or new concerning symptoms return to the emergency department.

## 2022-05-22 NOTE — ED ADULT NURSE NOTE - OBJECTIVE STATEMENT
Receive pt. in ER room 27 alert and oriented x 4, presenting to the ER with complaints of heart palpitations. Pt. stated "I have palpitations on and off for a week and I have been loosing weight for the past 3 months". Place on cardiac monitor, labs sent, pt. ambulating without difficulty, call bell place within reach.

## 2022-05-22 NOTE — ED PROVIDER NOTE - CLINICAL SUMMARY MEDICAL DECISION MAKING FREE TEXT BOX
63 y/o female with pmhx of HTN, hypothyroidism, presents to ED c/o intermittent palpitations x 1 year. Pt had COVID 1 year ago with palpitations that resolved after infection. Pt describes episodes of blurry vision, palpitations with cold and sweaty hands and feet, feeling pressure in the back of her head and neck x 6 months. Sometimes feels like she wants to pass out. Pt c/o foggy vision occurs when wakening in the morning, resolves on its own. Associated w/ unintentional 20 lb weight loss x 1 year. Pt feels anxious over her weight loss. Pt tearful talking about her daughter going thru a divorce. on exam pt well appearing, NAD. ekg NSR t wave inversions in V1 and V2. no arrythmia. plan to check labs, tsh, T3 and T4, cxr. discussed outpatient work up.

## 2022-05-22 NOTE — ED PROVIDER NOTE - PATIENT PORTAL LINK FT
You can access the FollowMyHealth Patient Portal offered by [facility] by registering at the following website: [email or web address]. By joining [facility]’s FollowMyHealth portal, you will also be able to view your health information using other applications (apps) compatible with our system.

## 2022-05-22 NOTE — ED ADULT TRIAGE NOTE - CHIEF COMPLAINT QUOTE
Pt states that she has had palpitations, strange feeling in her head and neck, weakness, foggy vision and abd pain for the last week.  Pt also c/o chills.  PMH HTN, hypothyroid

## 2022-05-22 NOTE — ED PROVIDER NOTE - ENMT, MLM
Airway patent, Nasal mucosa clear. Mouth with normal mucosa. Throat has no vesicles, no oropharyngeal exudates and uvula is midline. No goiter. Thyroid nontender.

## 2022-12-15 ENCOUNTER — APPOINTMENT (OUTPATIENT)
Dept: SURGERY | Facility: CLINIC | Age: 62
End: 2022-12-15

## 2022-12-15 VITALS
HEART RATE: 68 BPM | DIASTOLIC BLOOD PRESSURE: 84 MMHG | SYSTOLIC BLOOD PRESSURE: 130 MMHG | WEIGHT: 156 LBS | BODY MASS INDEX: 24.48 KG/M2 | HEIGHT: 67 IN

## 2022-12-15 DIAGNOSIS — Z12.39 ENCOUNTER FOR OTHER SCREENING FOR MALIGNANT NEOPLASM OF BREAST: ICD-10-CM

## 2022-12-15 DIAGNOSIS — N64.4 MASTODYNIA: ICD-10-CM

## 2022-12-15 PROCEDURE — 99213 OFFICE O/P EST LOW 20 MIN: CPT

## 2022-12-15 NOTE — DATA REVIEWED
[FreeTextEntry1] : Exam requested by:\par MERCEDES STONER MD\par [id number]\par [facility] [id number]\par SITE PERFORMED: [address]\par SITE PHONE: [phone]\par Patient: STANLEY MESA\par YOB: 1960\par Phone: [phone]\par MRN: [id number] Acc: [id number]\par Date of Exam: 11-\par  \par EXAM: DIGITAL BILATERAL SCREENING MAMMOGRAM WITH CAD AND TOMOSYNTHESIS\par \par HISTORY: The patient is 62 years old and is seen for a screening mammogram. Family history of breast cancer: None.\par \par CLINICAL BREAST EXAMINATION: The patient does not recall when she had her last clinical breast exam. \par \par TECHNIQUE: The following views were obtained digitally: bilateral craniocaudal, bilateral mediolateral oblique. Low-dose full-field digital breast tomosynthesis examination was performed with tomosynthesis acquisitions and synthesized 2D reconstructed mammogram. Computer-aided detection (CAD) was utilized.\par \par COMPARISON: The present examination has been compared to prior breast imaging studies dating back to 2020.\par \par FINDINGS:\par BREAST COMPOSITION: The breasts are heterogeneously dense, which may obscure small masses.\par \par No suspicious masses, architectural distortion, or significant calcifications are detected.\par \par IMPRESSION: No mammographic evidence of malignancy. \par \par FOLLOW-UP: Annual screening.\par \par \par ASSESSMENT: BI-RADS Category 1:  Negative.\par \par This examination should\par \par As per the FDA requirements, a layman's letter has been generated and sent to your patient stating the results and recommendations of this breast imaging study. We have entered your patient into our reminder system and will notify them when they are due for their next breast imaging exam. \par \par Thank you for the opportunity to participate in the care of this patient.  \par  \par FALGUNI LERMA MD  - Electronically Signed: 11- 5:03 AM \par Physician to Physician Direct Line is: [phone]\par Copy to:CHASIDY ANDERSON MD\par

## 2022-12-15 NOTE — HISTORY OF PRESENT ILLNESS
[de-identified] : This is  a 62 year   old patient presenting today for a breast exam and to discuss the results of her recent  breast imaging. Patient had a BL breast Mammogram on 11/07/2022 Deemed BIRADS category 1 .   she reports left breast pain on and off.  Ms. BUENO denies any trauma and she has no nipple discharge. Patient denies any fever, night sweats or loss of appetite.\par \par PERTINENT HISTORY:  \par There is no family history of breast carcinoma. Patient is on no hormonal replacement therapy. She is postmenopausal.  [de-identified] : Patient reports no interval changes to her overall health status or medical history

## 2022-12-15 NOTE — PLAN
[FreeTextEntry1] : Ms. BUENO  is presenting  today for an evaluation .  she is doing well and offers no complaints.  Results of  her recent  imaging and physical examination findings were discussed in details.   She  was advised to have BL breast US and Mammogram in  November 2023 and return after the tests. Importance of monthly self-breast examination was reinforced.  Patient's questions and concerns addressed to patient's satisfaction.\par \par Vitamin E daily for breast pain \par Avoid caffein and Chocolates to prevent breast pain

## 2022-12-15 NOTE — PHYSICAL EXAM
[Alert] : alert [Oriented to Person] : oriented to person [Oriented to Place] : oriented to place [Oriented to Time] : oriented to time [Calm] : calm [de-identified] : She  is alert, well-groomed, and cheerful.\par   [de-identified] : anicteric.  Nasal mucosa pink, septum midline. Oral mucosa pink.  Tongue midline, Pharynx without exudates.\par   [de-identified] : Neck supple. Trachea midline. Thyroid isthmus barely palpable, lobes not felt.\par   [de-identified] :  BL breast are symmetric. No masses; nipples without discharge. No palpable supraclavicular masses. Axillas are benign.

## 2023-10-12 NOTE — DISCHARGE NOTE NURSING/CASE MANAGEMENT/SOCIAL WORK - PATIENT PORTAL LINK FT
You can access the FollowMyHealth Patient Portal offered by [facility] by registering at the following website: [email or web address]. By joining [facility]’s FollowMyHealth portal, you will also be able to view your health information using other applications (apps) compatible with our system. Wound Care (No Sutures): Mastisol

## 2024-04-06 ENCOUNTER — EMERGENCY (EMERGENCY)
Facility: HOSPITAL | Age: 64
LOS: 1 days | Discharge: HOME | End: 2024-04-06
Attending: EMERGENCY MEDICINE | Admitting: STUDENT IN AN ORGANIZED HEALTH CARE EDUCATION/TRAINING PROGRAM
Payer: MEDICAID

## 2024-04-06 VITALS
DIASTOLIC BLOOD PRESSURE: 87 MMHG | TEMPERATURE: 98 F | SYSTOLIC BLOOD PRESSURE: 147 MMHG | RESPIRATION RATE: 16 BRPM | WEIGHT: 164.91 LBS | OXYGEN SATURATION: 100 % | HEART RATE: 87 BPM

## 2024-04-06 DIAGNOSIS — Z90.49 ACQUIRED ABSENCE OF OTHER SPECIFIED PARTS OF DIGESTIVE TRACT: Chronic | ICD-10-CM

## 2024-04-06 LAB
ALBUMIN SERPL ELPH-MCNC: 4.6 G/DL — SIGNIFICANT CHANGE UP (ref 3.3–5)
ALP SERPL-CCNC: 102 U/L — SIGNIFICANT CHANGE UP (ref 40–120)
ALT FLD-CCNC: 22 U/L — SIGNIFICANT CHANGE UP (ref 4–33)
ANION GAP SERPL CALC-SCNC: 11 MMOL/L — SIGNIFICANT CHANGE UP (ref 7–14)
APTT BLD: 32 SEC — SIGNIFICANT CHANGE UP (ref 24.5–35.6)
AST SERPL-CCNC: 24 U/L — SIGNIFICANT CHANGE UP (ref 4–32)
BASOPHILS # BLD AUTO: 0.04 K/UL — SIGNIFICANT CHANGE UP (ref 0–0.2)
BASOPHILS NFR BLD AUTO: 0.6 % — SIGNIFICANT CHANGE UP (ref 0–2)
BILIRUB SERPL-MCNC: 0.8 MG/DL — SIGNIFICANT CHANGE UP (ref 0.2–1.2)
BUN SERPL-MCNC: 8 MG/DL — SIGNIFICANT CHANGE UP (ref 7–23)
CALCIUM SERPL-MCNC: 9.4 MG/DL — SIGNIFICANT CHANGE UP (ref 8.4–10.5)
CHLORIDE SERPL-SCNC: 107 MMOL/L — SIGNIFICANT CHANGE UP (ref 98–107)
CO2 SERPL-SCNC: 23 MMOL/L — SIGNIFICANT CHANGE UP (ref 22–31)
CREAT SERPL-MCNC: 0.45 MG/DL — LOW (ref 0.5–1.3)
EGFR: 107 ML/MIN/1.73M2 — SIGNIFICANT CHANGE UP
EOSINOPHIL # BLD AUTO: 0.05 K/UL — SIGNIFICANT CHANGE UP (ref 0–0.5)
EOSINOPHIL NFR BLD AUTO: 0.8 % — SIGNIFICANT CHANGE UP (ref 0–6)
GLUCOSE SERPL-MCNC: 139 MG/DL — HIGH (ref 70–99)
HCT VFR BLD CALC: 37.6 % — SIGNIFICANT CHANGE UP (ref 34.5–45)
HGB BLD-MCNC: 12.2 G/DL — SIGNIFICANT CHANGE UP (ref 11.5–15.5)
IANC: 5.25 K/UL — SIGNIFICANT CHANGE UP (ref 1.8–7.4)
IMM GRANULOCYTES NFR BLD AUTO: 0.2 % — SIGNIFICANT CHANGE UP (ref 0–0.9)
INR BLD: 1.12 RATIO — SIGNIFICANT CHANGE UP (ref 0.85–1.18)
LYMPHOCYTES # BLD AUTO: 1.06 K/UL — SIGNIFICANT CHANGE UP (ref 1–3.3)
LYMPHOCYTES # BLD AUTO: 15.9 % — SIGNIFICANT CHANGE UP (ref 13–44)
MCHC RBC-ENTMCNC: 31.4 PG — SIGNIFICANT CHANGE UP (ref 27–34)
MCHC RBC-ENTMCNC: 32.4 GM/DL — SIGNIFICANT CHANGE UP (ref 32–36)
MCV RBC AUTO: 96.9 FL — SIGNIFICANT CHANGE UP (ref 80–100)
MONOCYTES # BLD AUTO: 0.24 K/UL — SIGNIFICANT CHANGE UP (ref 0–0.9)
MONOCYTES NFR BLD AUTO: 3.6 % — SIGNIFICANT CHANGE UP (ref 2–14)
NEUTROPHILS # BLD AUTO: 5.25 K/UL — SIGNIFICANT CHANGE UP (ref 1.8–7.4)
NEUTROPHILS NFR BLD AUTO: 78.9 % — HIGH (ref 43–77)
NRBC # BLD: 0 /100 WBCS — SIGNIFICANT CHANGE UP (ref 0–0)
NRBC # FLD: 0 K/UL — SIGNIFICANT CHANGE UP (ref 0–0)
PLATELET # BLD AUTO: 282 K/UL — SIGNIFICANT CHANGE UP (ref 150–400)
POTASSIUM SERPL-MCNC: 4.6 MMOL/L — SIGNIFICANT CHANGE UP (ref 3.5–5.3)
POTASSIUM SERPL-SCNC: 4.6 MMOL/L — SIGNIFICANT CHANGE UP (ref 3.5–5.3)
PROT SERPL-MCNC: 7.5 G/DL — SIGNIFICANT CHANGE UP (ref 6–8.3)
PROTHROM AB SERPL-ACNC: 12.6 SEC — SIGNIFICANT CHANGE UP (ref 9.5–13)
RBC # BLD: 3.88 M/UL — SIGNIFICANT CHANGE UP (ref 3.8–5.2)
RBC # FLD: 11.8 % — SIGNIFICANT CHANGE UP (ref 10.3–14.5)
SODIUM SERPL-SCNC: 141 MMOL/L — SIGNIFICANT CHANGE UP (ref 135–145)
TROPONIN T, HIGH SENSITIVITY RESULT: <6 NG/L — SIGNIFICANT CHANGE UP
TSH SERPL-MCNC: 1.52 UIU/ML — SIGNIFICANT CHANGE UP (ref 0.27–4.2)
WBC # BLD: 6.65 K/UL — SIGNIFICANT CHANGE UP (ref 3.8–10.5)
WBC # FLD AUTO: 6.65 K/UL — SIGNIFICANT CHANGE UP (ref 3.8–10.5)

## 2024-04-06 PROCEDURE — 93010 ELECTROCARDIOGRAM REPORT: CPT

## 2024-04-06 PROCEDURE — 93971 EXTREMITY STUDY: CPT | Mod: 26,LT

## 2024-04-06 PROCEDURE — 99223 1ST HOSP IP/OBS HIGH 75: CPT

## 2024-04-06 PROCEDURE — 71046 X-RAY EXAM CHEST 2 VIEWS: CPT | Mod: 26

## 2024-04-06 RX ORDER — LEVOTHYROXINE SODIUM 125 MCG
25 TABLET ORAL DAILY
Refills: 0 | Status: DISCONTINUED | OUTPATIENT
Start: 2024-04-06 | End: 2024-04-10

## 2024-04-06 RX ORDER — LOSARTAN POTASSIUM 100 MG/1
50 TABLET, FILM COATED ORAL DAILY
Refills: 0 | Status: DISCONTINUED | OUTPATIENT
Start: 2024-04-06 | End: 2024-04-10

## 2024-04-06 NOTE — ED CDU PROVIDER INITIAL DAY NOTE - OBJECTIVE STATEMENT
Patient is a 64-year-old female with past medical history of hypertension, hypothyroidism presents with lightheadedness today.  Patient reports while cleaning at home, she developed heart racing palpitations, tingling of her extremities associated with lightheadedness.  Patient reports she has had similar symptoms intermittently when straining to have bowel movements.  Patient denies any fevers, chills, chest pain, headache, neck pain, numbness, weakness, changes in vision or hearing, illicit drug use, alcohol abuse, first-degree family history of CAD.  In the emergency department, troponin T was less than 6.  Patient had chest x-ray which showed no focal consolidation.  Patient was placed in the observation unit for telemetry and echocardiogram.

## 2024-04-06 NOTE — ED ADULT NURSE NOTE - OBJECTIVE STATEMENT
Patient came in with the complaints of an episode of Dizziness this morning.  Patient endorses while eating breakfast that she felt a weird sensation in her legs and arms.  Patient also endorses tingling in her hands. Pt denies chest pain, sob, Headache, vision changes. Patient states that she had pain in the back of her right knee. Denies pain at this time.No other complaints. No distress noted. Nursing care continues

## 2024-04-06 NOTE — ED PROVIDER NOTE - CLINICAL SUMMARY MEDICAL DECISION MAKING FREE TEXT BOX
ED care was transitioned to me. Patient is pending CT imaging.    CT head was negative for any acute abnormalities. Patient discharged home with pediatric follow up.    Patient afebrile and hemodynamically labile.  Patient not in apparent acute distress.  DDx includes BPPV, dehydration, arrhythmia.  Dispo pending labs, imaging and reassessment.

## 2024-04-06 NOTE — ED ADULT NURSE NOTE - CHIEF COMPLAINT QUOTE
c/o lightheaded x1 week, blurry vision @ 1000AM that has now resolved. Smile symmetric. Speech spontaneous. Sensation equal BL. Strength BL. Spontaneous movement of all extremities hx Thyroid disease HTN . MD clark called for stroke eval. Stroke not activated as per MD

## 2024-04-06 NOTE — ED ADULT TRIAGE NOTE - CHIEF COMPLAINT QUOTE
c/o lightheaded x1 week, blurry vision @ 1000AM that has now resolved. Smile symmetric. Speech spontaneous. Sensation equal BL. Strength BL. Spontaneous movement of all extremities hx Thyroid disease HTN . MD clark called for stroke eval c/o lightheaded x1 week, blurry vision @ 1000AM that has now resolved. Smile symmetric. Speech spontaneous. Sensation equal BL. Strength BL. Spontaneous movement of all extremities hx Thyroid disease HTN . MD clark called for stroke eval. Stroke not activated as per MD

## 2024-04-06 NOTE — ED CDU PROVIDER INITIAL DAY NOTE - PROGRESS NOTE DETAILS
FRANCO Overton: pt resting comfortably in bed NAD, pt states she feels improved.  Pt pending echo, will continue to monitor.
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
## 2024-04-06 NOTE — ED CDU PROVIDER INITIAL DAY NOTE - ATTENDING APP SHARED VISIT CONTRIBUTION OF CARE
Dr. Teague:  I performed a face to face bedside interview with patient regarding history of present illness, review of symptoms and past medical history. I completed an independent physical exam.  I have discussed patient's plan of care with PA.   I agree with note as stated above, having amended the EMR as needed to reflect my findings.   This includes HISTORY OF PRESENT ILLNESS, HIV, PAST MEDICAL/SURGICAL/FAMILY/SOCIAL HISTORY, ALLERGIES AND HOME MEDICATIONS, REVIEW OF SYSTEMS, PHYSICAL EXAM, and any PROGRESS NOTES during the time I functioned as the attending physician for this patient.    64F h/o HTN, hypothyroidism, presents with dizziness.  Pt has been feeling waxing and waning lightheadedness x 1 week.  Earlier today, had a more severe episode of dizziness to the point of almost fainting, and had associated tingling sensation in her body/extremities.  Feels some pain behind right knee.  ROS otherwise negative.    Exam:  - nad  - rrr  - ctab   -abd soft ntnd  - no swelling BLE, no calf tenderness  - no focal neuro deficits, stable gait    A/P  - dizziness/lightheadedness, eval cardiac etiology  - cdu for telemetry & echo

## 2024-04-06 NOTE — ED PROVIDER NOTE - OBJECTIVE STATEMENT
64-year-old female with history of HTN on losartan and hypothyroidism on levothyroxine presents to the ED with dizziness since this morning.  Patient endorses while eating breakfast that she felt a weird sensation in her legs and arms.  Patient also endorses tingling in her hands. Pt denies chest pain, sob, HA, vision changes. Patient states that she had pain in the back of her right knee.  Patient denies any pain right now.  Patient denies any calf tenderness, clotting disorders, recent surgeries, recent travel.  Cholecystectomy.  NKDA.  Noncontributory social history.

## 2024-04-06 NOTE — ED PROVIDER NOTE - ATTENDING CONTRIBUTION TO CARE
Dr. Teague:  I have personally performed a face to face bedside history and physical examination of this patient. I have discussed the history, examination, review of systems, assessment and plan of management with the resident. I have reviewed the electronic medical record and amended it to reflect my history, review of systems, physical exam, assessment and plan.    64F h/o HTN, hypothyroidism, presents with dizziness.  Pt has been feeling waxing and waning lightheadedness x 1 week.  Earlier today, had a more severe episode of dizziness to the point of almost fainting, and had associated tingling sensation in her body/extremities.  Feels some pain behind right knee.  ROS otherwise negative.    Exam:  - nad  - rrr  - ctab   -abd soft ntnd  - no swelling BLE, no calf tenderness  - no focal neuro deficits, stable gait    A/P  - dizziness/lightheadedness, eval cardiac etiology  - cbc, cmp, trop, coags, ekg. cxr

## 2024-04-06 NOTE — ED PROVIDER NOTE - PHYSICAL EXAMINATION
Const: Awake, alert, no acute distress.  Well appearing.  Moving comfortably on stretcher.  HEENT: NC/AT.  Moist mucous membranes.  No pharyngeal erythema, no exudates.  Eyes: Extraocular movements intact b/l.  Conjunctiva pink.  No scleral icterus.  Neck: Neck supple, full ROM without pain.  Cardiac: Regular rate and regular rhythm. S1 S2 present.  Peripheral pulses 2+ and symmetric. No LE edema. No calf ttp. Negative Mary Beth's sign.   Resp: Speaking in full sentences. No evidence of respiratory distress.  Breath sounds clear to auscultation b/l. Normal chest excursion.   Abd: Non-distended, no overlying skin changes.  Soft, non-tender, no guarding, no rigidity, no rebound tenderness.  No palpable masses.  Normal bowel sounds in all 4 quadrants.  Back: Spine midline and non-tender. No CVAT.  Skin: Normal coloration.  No rashes, abrasions or lacerations.  Neuro: Awake, alert & oriented x 3.  Moves all extremities spontaneously.  No focal deficits.

## 2024-04-06 NOTE — ED CDU PROVIDER INITIAL DAY NOTE - CLINICAL SUMMARY MEDICAL DECISION MAKING FREE TEXT BOX
Patient is a 64-year-old female with past medical history of hypertension, hypothyroidism presents with lightheadedness today.  Patient reports while cleaning at home, she developed heart racing palpitations, tingling of her extremities associated with lightheadedness.  Patient reports she has had similar symptoms intermittently when straining to have bowel movements.  Patient denies any fevers, chills, chest pain, headache, neck pain, numbness, weakness, changes in vision or hearing, illicit drug use, alcohol abuse, first-degree family history of CAD.  In the emergency department, troponin T was less than 6.  Patient had chest x-ray which showed no focal consolidation.  Patient was placed in the observation unit for telemetry and echocardiogram.  Plan to have echocardiogram performed.

## 2024-04-07 ENCOUNTER — RESULT REVIEW (OUTPATIENT)
Age: 64
End: 2024-04-07

## 2024-04-07 VITALS
SYSTOLIC BLOOD PRESSURE: 123 MMHG | DIASTOLIC BLOOD PRESSURE: 81 MMHG | TEMPERATURE: 98 F | RESPIRATION RATE: 16 BRPM | OXYGEN SATURATION: 98 % | HEART RATE: 71 BPM

## 2024-04-07 PROCEDURE — 93306 TTE W/DOPPLER COMPLETE: CPT | Mod: 26

## 2024-04-07 PROCEDURE — 93356 MYOCRD STRAIN IMG SPCKL TRCK: CPT

## 2024-04-07 PROCEDURE — 76376 3D RENDER W/INTRP POSTPROCES: CPT | Mod: 26

## 2024-04-07 PROCEDURE — 99239 HOSP IP/OBS DSCHRG MGMT >30: CPT

## 2024-04-07 RX ADMIN — LOSARTAN POTASSIUM 50 MILLIGRAM(S): 100 TABLET, FILM COATED ORAL at 06:10

## 2024-04-07 RX ADMIN — Medication 25 MICROGRAM(S): at 06:10

## 2024-04-07 NOTE — ED CDU PROVIDER SUBSEQUENT DAY NOTE - ATTENDING APP SHARED VISIT CONTRIBUTION OF CARE
65 y/o female with a hx of HTN, hypothyroidism presented to the ER c/o dizziness.  Pt placed in CDU for cardiac monitoring and echo.  agree with above: placed in cdu for echo in setting of lightheadedness  feels improved today, pending echo

## 2024-04-07 NOTE — ED CDU PROVIDER DISPOSITION NOTE - ATTENDING CONTRIBUTION TO CARE
Patient is a 64-year-old female with past medical history of hypertension, hypothyroidism presents with lightheadedness today.  In the emergency department, troponin T was less than 6.  Patient had chest x-ray which showed no focal consolidation.  Patient was placed in the observation unit for telemetry and echocardiogram.  While in CDU patient seen resting comfortably and reports improvement of symptoms. Patient Echo normal and showed no acute changes.  Spoke with Dr. Rae Chavez, and states that patient can follow up outpatient on April 11th. Patient is hemodynamically stable, ambulating well and reports improvement of symptoms  agree with above: pt placed in cdu for echo in setting of lightheadedness, echo wnl, pt has outpt fu

## 2024-04-07 NOTE — ED CDU PROVIDER DISPOSITION NOTE - CARE PROVIDER_API CALL
Pradeep Jalloh  Cardiovascular Disease  [address] [facility], [facility] [address]  [place] [address]  Phone: [phone]  Fax: [phone]  Follow Up Time:    Bulmaro Polanco  Cardiology  [address] [facility], [address]  [place] [address]  Phone: [phone]  Fax: [phone]  Follow Up Time:

## 2024-04-07 NOTE — ED CDU PROVIDER DISPOSITION NOTE - CLINICAL COURSE
Patient is a 64-year-old female with past medical history of hypertension, hypothyroidism presents with lightheadedness today.  In the emergency department, troponin T was less than 6.  Patient had chest x-ray which showed no focal consolidation.  Patient was placed in the observation unit for telemetry and echocardiogram.    While in CDU patient seen resting comfortably and reports improvement of symptoms. Patient Echo showed ***.  Cardiology seen patient at bedside and states that patient can follow up outpatient on April 11th. Patient is hemodynamically stable, ambulating well and reports improvement of symptoms Patient is a 64-year-old female with past medical history of hypertension, hypothyroidism presents with lightheadedness today.  In the emergency department, troponin T was less than 6.  Patient had chest x-ray which showed no focal consolidation.  Patient was placed in the observation unit for telemetry and echocardiogram.    While in CDU patient seen resting comfortably and reports improvement of symptoms. Patient Echo showed ***.  Spoke with Dr. Rae Chavez, and states that patient can follow up outpatient on April 11th. Patient is hemodynamically stable, ambulating well and reports improvement of symptoms Patient is a 64-year-old female with past medical history of hypertension, hypothyroidism presents with lightheadedness today.  In the emergency department, troponin T was less than 6.  Patient had chest x-ray which showed no focal consolidation.  Patient was placed in the observation unit for telemetry and echocardiogram.    While in CDU patient seen resting comfortably and reports improvement of symptoms. Patient Echo normal and showed no acute changes.  Spoke with Dr. Rae Chavez, and states that patient can follow up outpatient on April 11th. Patient is hemodynamically stable, ambulating well and reports improvement of symptoms

## 2024-04-07 NOTE — ED CDU PROVIDER DISPOSITION NOTE - PATIENT PORTAL LINK FT
You can access the FollowMyHealth Patient Portal offered by [facility] by registering at the following website: [email or web address]. By joining [employer]’s FollowMyHealth portal, you will also be able to view your health information using other applications (apps) compatible with our system.

## 2024-04-07 NOTE — ED CDU PROVIDER SUBSEQUENT DAY NOTE - HISTORY
FRANCO Overton: pt sleeping, comfortably in bed, NAD, pt had echo pending.  Will continue to monitor.

## 2024-04-07 NOTE — ED CDU PROVIDER DISPOSITION NOTE - NSFOLLOWUPINSTRUCTIONS_ED_ALL_ED_FT
You were seen in our department for Dizziness  Follow up with your PMD in 48-72 hours for further monitoring.  Follow up with Dr. Jalloh on Thursday April 11th, 2024 for further monitoring  if you develop any chest pain, dizziness, high fevers, weakness, numbness, tingling, vision changes, or any worsening symptoms return to our ED for evaluation. You were seen in our department for Dizziness  Follow up with your PMD in 48-72 hours for further monitoring.  Follow up with Dr Bulmaro Polanco this Thursday (04/11/2024) [facility] for [facility] management  if you develop any chest pain, dizziness, high fevers, weakness, numbness, tingling, vision changes, or any worsening symptoms return to our ED for evaluation.

## 2024-04-07 NOTE — ED CDU PROVIDER SUBSEQUENT DAY NOTE - CLINICAL SUMMARY MEDICAL DECISION MAKING FREE TEXT BOX
63 y/o female with a hx of HTN, hypothyroidism presented to the ER c/o dizziness.  Pt placed in CDU for cardiac monitoring and echo.

## 2024-04-07 NOTE — CONSULT NOTE ADULT - SUBJECTIVE AND OBJECTIVE BOX
EP ATTENDING    History: She is a pleasant 65 y/o female who presents with non-specific presyncope/dizziness. Her baseline EKG is unremarkable. Her prior cardiac workup includes unremarkable ETT in April 2023, and unremarkable echo/holter in 2022. She denies high risk features such as angina.    PMH: hypertension, hypothyroidism  PShx: cholecystectomy  Home Meds: losartan, synthroid  NKDA  MEDICATIONS  (STANDING):  levothyroxine 25 MICROGram(s) Oral daily  losartan 50 milliGRAM(s) Oral daily    Non-contributary for premature coronary disease or sudden cardiac death    SOCIAL HISTORY:    [x ] Non-smoker  [ ] Smoker  [ ] Alcohol      REVIEW OF SYSTEMS:  [ ]chest pain  [  ]shortness of breath  [  ]palpitations  [  ]syncope  [ ]near syncope [ ]upper extremity weakness   [ ] lower extremity weakness  [  ]diplopia  [  ]altered mental status   [  ]fevers  [ ]chills [ ]nausea  [ ]vomitting  [  ]dysphagia    [ ]abdominal pain  [ ]melena  [ ]BRBPR    [  ]epistaxis  [  ]rash    [ ]lower extremity edema        [ ] All others negative	  [ ] Unable to obtain    PHYSICAL EXAM:  T(C): 36.4 (04-07-24 @ 10:09), Max: 36.7 (04-06-24 @ 14:12)  HR: 73 (04-07-24 @ 10:09) (55 - 87)  BP: 107/71 (04-07-24 @ 10:09) (107/71 - 147/87)  RR: 16 (04-07-24 @ 10:09) (16 - 18)  SpO2: 99% (04-07-24 @ 10:09) (97% - 100%)  Wt(kg): --    Appearance: Normal	  HEENT:   Normal oral mucosa, PERRL, EOMI	  Lymphatic: No lymphadenopathy , no edema  Cardiovascular: Normal S1 S2, No JVD, No murmurs , Peripheral pulses palpable 2+ bilaterally  Respiratory: Lungs clear to auscultation, normal effort 	  Gastrointestinal:  Soft, Non-tender, + BS	  Skin: No rashes, No ecchymoses, No cyanosis, warm to touch  Musculoskeletal: Normal range of motion, normal strength  Psychiatry:  Mood & affect appropriate      TELEMETRY: 	    ECG:  	    Echo:  NST:  Cath:  	  	  LABS:	 	                          12.2   6.65  )-----------( 282      ( 06 Apr 2024 15:10 )             37.6     04-06    141  |  107  |  8   ----------------------------<  139<H>  4.6   |  23  |  0.45<L>    Ca    9.4      06 Apr 2024 15:10    TPro  7.5  /  Alb  4.6  /  TBili  0.8  /  DBili  x   /  AST  24  /  ALT  22  /  AlkPhos  102  04-06    proBNP:   Lipid Profile:   HgA1c:   TSH: Thyroid Stimulating Hormone, Serum: 1.52 uIU/mL (04-06 @ 15:10)      A/P) She is a pleasant 65 y/o female PMH hypertension and hypothyroidism who presents with non-specific presyncope/dizziness. Her baseline EKG is unremarkable. Her prior cardiac workup includes unremarkable ETT in April 2023, and unremarkable echo/holter in 2022. She denies high risk features such as angina.    -continue synthroid for hypothyroidism  -continue losartan for hypertension  -f/u repeat echo, if normal then d/c home  -f/u with her cardiologist Dr Bulmaro Polanco this thursday at our [place] office for repeat event monitoring        Rae Chavez M.D., RS  Cardiac Electrophysiology  [facility]  [address] [place] [address], [address] [place] [address]  [email or web address]    office [phone]  pager [phone] Airway patent, TM normal bilaterally, normal appearing mouth, nose, throat, neck supple with full range of motion, no cervical adenopathy. stuffy nose no bulging frontal bone Airway patent, TM normal bilaterally, normal appearing mouth, nose, throat, neck supple with full range of motion, no cervical adenopathy. stuffy nose no bulging frontal bone , no nasal flaring

## 2024-04-25 ENCOUNTER — INPATIENT (INPATIENT)
Facility: HOSPITAL | Age: 64
LOS: 5 days | Discharge: HOME | End: 2024-05-01
Attending: HOSPITALIST | Admitting: HOSPITALIST
Payer: MEDICAID

## 2024-04-25 VITALS
HEART RATE: 109 BPM | DIASTOLIC BLOOD PRESSURE: 76 MMHG | WEIGHT: 158.07 LBS | OXYGEN SATURATION: 95 % | SYSTOLIC BLOOD PRESSURE: 172 MMHG | RESPIRATION RATE: 16 BRPM | TEMPERATURE: 98 F

## 2024-04-25 DIAGNOSIS — Z90.49 ACQUIRED ABSENCE OF OTHER SPECIFIED PARTS OF DIGESTIVE TRACT: Chronic | ICD-10-CM

## 2024-04-25 LAB
ADD ON TEST-SPECIMEN IN LAB: SIGNIFICANT CHANGE UP
ALBUMIN SERPL ELPH-MCNC: 4.5 G/DL — SIGNIFICANT CHANGE UP (ref 3.3–5)
ALP SERPL-CCNC: 92 U/L — SIGNIFICANT CHANGE UP (ref 40–120)
ALT FLD-CCNC: 21 U/L — SIGNIFICANT CHANGE UP (ref 4–33)
ANION GAP SERPL CALC-SCNC: 13 MMOL/L — SIGNIFICANT CHANGE UP (ref 7–14)
AST SERPL-CCNC: 16 U/L — SIGNIFICANT CHANGE UP (ref 4–32)
BASOPHILS # BLD AUTO: 0.06 K/UL — SIGNIFICANT CHANGE UP (ref 0–0.2)
BASOPHILS NFR BLD AUTO: 0.6 % — SIGNIFICANT CHANGE UP (ref 0–2)
BILIRUB SERPL-MCNC: 0.7 MG/DL — SIGNIFICANT CHANGE UP (ref 0.2–1.2)
BUN SERPL-MCNC: 14 MG/DL — SIGNIFICANT CHANGE UP (ref 7–23)
CALCIUM SERPL-MCNC: 9.3 MG/DL — SIGNIFICANT CHANGE UP (ref 8.4–10.5)
CHLORIDE SERPL-SCNC: 104 MMOL/L — SIGNIFICANT CHANGE UP (ref 98–107)
CO2 SERPL-SCNC: 23 MMOL/L — SIGNIFICANT CHANGE UP (ref 22–31)
CREAT SERPL-MCNC: 0.55 MG/DL — SIGNIFICANT CHANGE UP (ref 0.5–1.3)
EGFR: 102 ML/MIN/1.73M2 — SIGNIFICANT CHANGE UP
EOSINOPHIL # BLD AUTO: 0.09 K/UL — SIGNIFICANT CHANGE UP (ref 0–0.5)
EOSINOPHIL NFR BLD AUTO: 0.9 % — SIGNIFICANT CHANGE UP (ref 0–6)
GLUCOSE SERPL-MCNC: 102 MG/DL — HIGH (ref 70–99)
HCT VFR BLD CALC: 38.3 % — SIGNIFICANT CHANGE UP (ref 34.5–45)
HGB BLD-MCNC: 12.5 G/DL — SIGNIFICANT CHANGE UP (ref 11.5–15.5)
IANC: 7.47 K/UL — HIGH (ref 1.8–7.4)
IMM GRANULOCYTES NFR BLD AUTO: 0.2 % — SIGNIFICANT CHANGE UP (ref 0–0.9)
LYMPHOCYTES # BLD AUTO: 2.15 K/UL — SIGNIFICANT CHANGE UP (ref 1–3.3)
LYMPHOCYTES # BLD AUTO: 20.9 % — SIGNIFICANT CHANGE UP (ref 13–44)
MCHC RBC-ENTMCNC: 31.3 PG — SIGNIFICANT CHANGE UP (ref 27–34)
MCHC RBC-ENTMCNC: 32.6 GM/DL — SIGNIFICANT CHANGE UP (ref 32–36)
MCV RBC AUTO: 95.8 FL — SIGNIFICANT CHANGE UP (ref 80–100)
MONOCYTES # BLD AUTO: 0.52 K/UL — SIGNIFICANT CHANGE UP (ref 0–0.9)
MONOCYTES NFR BLD AUTO: 5 % — SIGNIFICANT CHANGE UP (ref 2–14)
NEUTROPHILS # BLD AUTO: 7.47 K/UL — HIGH (ref 1.8–7.4)
NEUTROPHILS NFR BLD AUTO: 72.4 % — SIGNIFICANT CHANGE UP (ref 43–77)
NRBC # BLD: 0 /100 WBCS — SIGNIFICANT CHANGE UP (ref 0–0)
NRBC # FLD: 0 K/UL — SIGNIFICANT CHANGE UP (ref 0–0)
NT-PROBNP SERPL-SCNC: <36 PG/ML — SIGNIFICANT CHANGE UP
PLATELET # BLD AUTO: 277 K/UL — SIGNIFICANT CHANGE UP (ref 150–400)
POTASSIUM SERPL-MCNC: 4 MMOL/L — SIGNIFICANT CHANGE UP (ref 3.5–5.3)
POTASSIUM SERPL-SCNC: 4 MMOL/L — SIGNIFICANT CHANGE UP (ref 3.5–5.3)
PROT SERPL-MCNC: 7.2 G/DL — SIGNIFICANT CHANGE UP (ref 6–8.3)
RBC # BLD: 4 M/UL — SIGNIFICANT CHANGE UP (ref 3.8–5.2)
RBC # FLD: 12 % — SIGNIFICANT CHANGE UP (ref 10.3–14.5)
SODIUM SERPL-SCNC: 140 MMOL/L — SIGNIFICANT CHANGE UP (ref 135–145)
TROPONIN T, HIGH SENSITIVITY RESULT: 9 NG/L — SIGNIFICANT CHANGE UP
WBC # BLD: 10.31 K/UL — SIGNIFICANT CHANGE UP (ref 3.8–10.5)
WBC # FLD AUTO: 10.31 K/UL — SIGNIFICANT CHANGE UP (ref 3.8–10.5)

## 2024-04-25 PROCEDURE — 99285 EMERGENCY DEPT VISIT HI MDM: CPT

## 2024-04-25 RX ORDER — SODIUM CHLORIDE 9 MG/ML
1000 INJECTION INTRAMUSCULAR; INTRAVENOUS; SUBCUTANEOUS ONCE
Refills: 0 | Status: COMPLETED | OUTPATIENT
Start: 2024-04-25 | End: 2024-04-25

## 2024-04-25 RX ADMIN — SODIUM CHLORIDE 1000 MILLILITER(S): 9 INJECTION INTRAMUSCULAR; INTRAVENOUS; SUBCUTANEOUS at 20:25

## 2024-04-25 NOTE — ED ADULT NURSE NOTE - NSFALLUNIVINTERV_ED_ALL_ED
Bed/Stretcher in lowest position, wheels locked, appropriate side rails in place/Call bell, personal items and telephone in reach/Instruct patient to call for assistance before getting out of bed/chair/stretcher/Non-slip footwear applied when patient is off stretcher/[place] to call system/Physically safe environment - no spills, clutter or unnecessary equipment/Purposeful proactive rounding/Room/bathroom lighting operational, light cord in reach

## 2024-04-25 NOTE — ED PROVIDER NOTE - ATTENDING APP SHARED VISIT CONTRIBUTION OF CARE
agree with above hpi  on my exam  GEN - NAD;  nontoxic appearing; A+O x3   HEAD - NC/AT   EYES- PERRL, EOMI  ENT: Airway patent, mmm, Oral cavity and pharynx normal. No inflammation, swelling, exudate, or lesions.  NECK: Neck supple  PULMONARY - CTA b/l, symmetric breath sounds.   CARDIAC -s1s2, RRR, no M,G,R  ABDOMEN - +BS, ND, NT, soft, no guarding, no rebound, no masses   RECTAL-non thrombosed non bleeding nontender ext hemorrhoid, no internal mass-performed by paul العلي, chap by me.   BACK - no CVA tenderness, Normal  spine   EXTREMITIES - FROM, symmetric pulses, capillary refill < 2 seconds, no edema   SKIN - no rash or bruising   NEUROLOGIC - alert, speech clear, face symmetric, 5/5 str b/l ue and le, silt  PSYCH -nl mood/affect, nl insight.  a/p-patient presenting to ed with concern for progressive worsening of generalized weakness, daily intermittent lightheadedness, weight loss of approx 17 pounds unintentionally, dec appetite, occ int abd pain (none at this time), previous recent colonsocopy limited by stool burden despite patient reporting appropriate prep. Reports the last few months she feels so weak its difficult to walk more than a few steps and in last few days she feels very weak like she cant stand for for more then a few seconds before getting lightheaded and feeling like she is going to pass out. Has not actually lost consciousness. no ha/vision changes/numbness/confusion/speech difficulty. No fevers, cp, sob, cough, congestion, recent vomiting. Had previous blood in her stool at onset of symptoms which is what prompted the gi w/u with colonoscopy and ct a/p (does not know results of ct). At this time on exam in the bed she reports she feels ok, when stood up on eval reporting she feels lightheaded and like her legs are buckling. No localizing neuro deficits, unlabored breathing, no edema, ext well perfused, clear lungs, abd soft/nt. Plan for labs, ua/cx, ekg, evaluate for diff including but not limited to anemia, elec dist, organ dysfunction, infections, screen for arrythmia, acs, check orthostatics. Patient agreeable to plan. agree with above hpi  on my exam  GEN - NAD;  nontoxic appearing; A+O x3   HEAD - NC/AT   EYES- PERRL, EOMI  ENT: Airway patent, mmm, Oral cavity and pharynx normal. No inflammation, swelling, exudate, or lesions.  NECK: Neck supple  PULMONARY - CTA b/l, symmetric breath sounds.   CARDIAC -s1s2, RRR, no M,G,R  ABDOMEN - +BS, ND, NT, soft, no guarding, no rebound, no masses   RECTAL-non thrombosed non bleeding nontender ext hemorrhoid, no internal mass-performed by paul العلي, chap by me.   BACK - no CVA tenderness, Normal  spine   EXTREMITIES - FROM, symmetric pulses, capillary refill < 2 seconds, no edema   SKIN - no rash or bruising   NEUROLOGIC - alert, speech clear, face symmetric, 5/5 str b/l ue and le, silt  PSYCH -nl mood/affect, nl insight.  a/p-patient presenting to ed with concern for progressive worsening of generalized weakness, daily intermittent lightheadedness, weight loss of approx 17 pounds unintentionally, dec appetite, occ int abd pain (none at this time), previous recent colonsocopy limited by stool burden despite patient reporting appropriate prep. Reports the last few months she feels so weak its difficult to walk more than a few steps and in last few days she feels very weak like she cant stand for for more then a few seconds before getting lightheaded and feeling like she is going to pass out. Has not actually lost consciousness. no ha/vision changes/numbness/confusion/speech difficulty. No fevers, cp, sob, cough, congestion, recent vomiting. Had previous blood in her stool at onset of symptoms which is what prompted the gi w/u with colonoscopy and ct a/p (does not know results of ct). At this time on exam in the bed she reports she feels ok, when stood up on eval reporting she feels lightheaded and like her legs are buckling. No localizing neuro deficits, unlabored breathing, no edema, ext well perfused, clear lungs, abd soft/nt. Plan for labs, ua/cx, ekg, evaluate for diff including but not limited to anemia, elec dist, organ dysfunction, infections, screen for arrythmia, acs, check orthostatics. Patient agreeable to plan

## 2024-04-25 NOTE — ED PROVIDER NOTE - OBJECTIVE STATEMENT
63 y/o F PMH HTN, hypothyroidism c/o 1 month of 15-16lb unintentional weight loss and gradually worsening lightheadedness and fatigue. Pt states when she stands up and minimally exerts herself she experiences ADAMS, palpitations, and today while in kitchen cooking, her legs became tremulous and pt felt she was about to pass out. Pt was evaluated in ED earlier this month for similar sxs, had unremarkable echo done at the time. Pt notes she has been following with her PMD who ordered CT a/p and labs recently, labs were unremarkable but unsure of CT results. Also states she was supposed to have colonoscopy performed earlier this month but even after the prep, her GI told her he was unable to perform procedure d/t stool burden. Pt notes she has very small BMs this past month, often feels very lightheaded when having BM, and admits to postprandial abdominal cramping lately. Pt has colonoscopy scheduled for 5/6 next month. Her PMD has also referred her to oncologist d/t weight loss which is scheduled at the end of May. 63 y/o F PMH HTN, hypothyroidism c/o 1 month of 15-16lb unintentional weight loss and gradually worsening lightheadedness and fatigue. Pt states when she stands up and minimally exerts herself she experiences ADAMS, palpitations, and today while in kitchen cooking, her legs became tremulous and pt felt she was about to pass out. Pt was evaluated in ED earlier this month for similar sxs, had unremarkable echo done at the time. Pt notes she has been following with her PMD who ordered CT a/p and labs recently, labs were unremarkable but unsure of CT results. Also states she was supposed to have colonoscopy performed earlier this month but even after the prep, her GI told her he was unable to fully perform procedure d/t stool burden. Pt notes she has very small BMs this past month, often feels very lightheaded when having BM, and admits to postprandial abdominal cramping lately. Pt has colonoscopy scheduled for 5/6 next month. Her PMD has also referred her to oncologist d/t weight loss which is scheduled at the end of May.

## 2024-04-25 NOTE — ED ADULT TRIAGE NOTE - CHIEF COMPLAINT QUOTE
c/o BL leg weakness, SOB and sudden weight loss worsening since Sunday. States " my legs just tremble all time" Respirations even and unlabored. Oxygen saturation WNL. Able to speak full complete sentences without difficulty. Airway open patent and unobstructed. hx HTN, thyroid disease

## 2024-04-25 NOTE — ED ADULT NURSE NOTE - OBJECTIVE STATEMENT
Pt received to intake room 10B Pt A&O x 4, ambulatory. Pt c/o bilateral leg weakness, SOB and sudden weight loss (16lbs within a month). Pt states her legs shake and tremble. Pt endorses thin stools x 1 month and weakness. Hx of HTN. Pt denies fevers, headache, vision changes, chest pain, numbness or tingling, abdominal pain, V/D/C, or urinary symptoms. PERRLA observed. No neuro deficits. Respirations even and unlabored. Abdomen soft, tender in lower quadrants, nondistended. +2 pulses in all extremities. 20G IV placed in the left AC. Labs drawn and sent. Medicated as per MD orders. Comfort measures provided. Safety maintained. Pending lab results and imaging.

## 2024-04-25 NOTE — ED PROVIDER NOTE - CLINICAL SUMMARY MEDICAL DECISION MAKING FREE TEXT BOX
Pt with progressively worsening lightheadedness in context of unintentional weight loss, difficulty passing stool; now having difficulty performing ADLs d/t lightheadedness. Will assess for FTT with cbc, cmp, trop, pro-bnp, ekg

## 2024-04-25 NOTE — ED PROVIDER NOTE - PROGRESS NOTE DETAILS
Willow Andres PA:  pt signed out to me pending TSHr  pt with hx of Hypothyroid, HTN p/w generalized fatigue, significant weight loss. had outpatient failed colonscopy due to large stool burden recently, as well as was referred to oncologist for malignancy w/u for her weight loss. Labs wnl, TSH wnl. Plan for admission for failure to thrive.

## 2024-04-25 NOTE — ED ADULT NURSE NOTE - SUICIDE SCREENING DEPRESSION
Pt up in the chair per PT.  He did pivot transfer to the chair using a walker.
 No pain complaints although he was very weak.  No needs verbalized, call
light within reach.  I did touch base with her daughter and she questioned
what type of facility pt was going to as her brother stated it was just an
apartment with emergency care.  I informed her that he would be going
somewhere skilled with 24 hour care.  She asked that I confirm this which I
did with social work.  Attempted to call her back, but there was no answer. Negative

## 2024-04-26 DIAGNOSIS — R10.9 UNSPECIFIED ABDOMINAL PAIN: ICD-10-CM

## 2024-04-26 DIAGNOSIS — I10 ESSENTIAL (PRIMARY) HYPERTENSION: ICD-10-CM

## 2024-04-26 DIAGNOSIS — R63.4 ABNORMAL WEIGHT LOSS: ICD-10-CM

## 2024-04-26 DIAGNOSIS — R55 SYNCOPE AND COLLAPSE: ICD-10-CM

## 2024-04-26 DIAGNOSIS — E03.9 HYPOTHYROIDISM, UNSPECIFIED: ICD-10-CM

## 2024-04-26 DIAGNOSIS — Z29.9 ENCOUNTER FOR PROPHYLACTIC MEASURES, UNSPECIFIED: ICD-10-CM

## 2024-04-26 DIAGNOSIS — R53.1 WEAKNESS: ICD-10-CM

## 2024-04-26 LAB
ADD ON TEST-SPECIMEN IN LAB: SIGNIFICANT CHANGE UP
ADD ON TEST-SPECIMEN IN LAB: SIGNIFICANT CHANGE UP
ANION GAP SERPL CALC-SCNC: 10 MMOL/L — SIGNIFICANT CHANGE UP (ref 7–14)
BUN SERPL-MCNC: 9 MG/DL — SIGNIFICANT CHANGE UP (ref 7–23)
CALCIUM SERPL-MCNC: 8.9 MG/DL — SIGNIFICANT CHANGE UP (ref 8.4–10.5)
CHLORIDE SERPL-SCNC: 108 MMOL/L — HIGH (ref 98–107)
CO2 SERPL-SCNC: 23 MMOL/L — SIGNIFICANT CHANGE UP (ref 22–31)
CREAT SERPL-MCNC: 0.44 MG/DL — LOW (ref 0.5–1.3)
EGFR: 108 ML/MIN/1.73M2 — SIGNIFICANT CHANGE UP
FERRITIN SERPL-MCNC: 260 NG/ML — SIGNIFICANT CHANGE UP (ref 13–330)
GLUCOSE SERPL-MCNC: 83 MG/DL — SIGNIFICANT CHANGE UP (ref 70–99)
HCT VFR BLD CALC: 37.1 % — SIGNIFICANT CHANGE UP (ref 34.5–45)
HGB BLD-MCNC: 12.2 G/DL — SIGNIFICANT CHANGE UP (ref 11.5–15.5)
LIDOCAIN IGE QN: 34 U/L — SIGNIFICANT CHANGE UP (ref 7–60)
MAGNESIUM SERPL-MCNC: 2.1 MG/DL — SIGNIFICANT CHANGE UP (ref 1.6–2.6)
MCHC RBC-ENTMCNC: 32 PG — SIGNIFICANT CHANGE UP (ref 27–34)
MCHC RBC-ENTMCNC: 32.9 GM/DL — SIGNIFICANT CHANGE UP (ref 32–36)
MCV RBC AUTO: 97.4 FL — SIGNIFICANT CHANGE UP (ref 80–100)
NRBC # BLD: 0 /100 WBCS — SIGNIFICANT CHANGE UP (ref 0–0)
NRBC # FLD: 0 K/UL — SIGNIFICANT CHANGE UP (ref 0–0)
PHOSPHATE SERPL-MCNC: 3.2 MG/DL — SIGNIFICANT CHANGE UP (ref 2.5–4.5)
PLATELET # BLD AUTO: 245 K/UL — SIGNIFICANT CHANGE UP (ref 150–400)
POTASSIUM SERPL-MCNC: 4 MMOL/L — SIGNIFICANT CHANGE UP (ref 3.5–5.3)
POTASSIUM SERPL-SCNC: 4 MMOL/L — SIGNIFICANT CHANGE UP (ref 3.5–5.3)
RBC # BLD: 3.81 M/UL — SIGNIFICANT CHANGE UP (ref 3.8–5.2)
RBC # FLD: 11.9 % — SIGNIFICANT CHANGE UP (ref 10.3–14.5)
SODIUM SERPL-SCNC: 141 MMOL/L — SIGNIFICANT CHANGE UP (ref 135–145)
WBC # BLD: 6.73 K/UL — SIGNIFICANT CHANGE UP (ref 3.8–10.5)
WBC # FLD AUTO: 6.73 K/UL — SIGNIFICANT CHANGE UP (ref 3.8–10.5)

## 2024-04-26 PROCEDURE — 76700 US EXAM ABDOM COMPLETE: CPT | Mod: 26

## 2024-04-26 PROCEDURE — 99223 1ST HOSP IP/OBS HIGH 75: CPT

## 2024-04-26 PROCEDURE — 99222 1ST HOSP IP/OBS MODERATE 55: CPT | Mod: GC

## 2024-04-26 RX ORDER — SOD SULF/SODIUM/NAHCO3/KCL/PEG
2000 SOLUTION, RECONSTITUTED, ORAL ORAL ONCE
Refills: 0 | Status: COMPLETED | OUTPATIENT
Start: 2024-04-28 | End: 2024-04-28

## 2024-04-26 RX ORDER — ACETAMINOPHEN 500 MG
650 TABLET ORAL EVERY 6 HOURS
Refills: 0 | Status: DISCONTINUED | OUTPATIENT
Start: 2024-04-26 | End: 2024-05-01

## 2024-04-26 RX ORDER — SENNA PLUS 8.6 MG/1
2 TABLET ORAL AT BEDTIME
Refills: 0 | Status: DISCONTINUED | OUTPATIENT
Start: 2024-04-26 | End: 2024-05-01

## 2024-04-26 RX ORDER — LOSARTAN POTASSIUM 100 MG/1
1 TABLET, FILM COATED ORAL
Qty: 0 | Refills: 0 | DISCHARGE

## 2024-04-26 RX ORDER — PSYLLIUM SEED (WITH DEXTROSE)
1 POWDER (GRAM) ORAL DAILY
Refills: 0 | Status: DISCONTINUED | OUTPATIENT
Start: 2024-04-26 | End: 2024-04-27

## 2024-04-26 RX ORDER — LOSARTAN POTASSIUM 100 MG/1
50 TABLET, FILM COATED ORAL DAILY
Refills: 0 | Status: DISCONTINUED | OUTPATIENT
Start: 2024-04-26 | End: 2024-05-01

## 2024-04-26 RX ORDER — LEVOTHYROXINE SODIUM 125 MCG
1 TABLET ORAL
Qty: 0 | Refills: 0 | DISCHARGE

## 2024-04-26 RX ORDER — POLYETHYLENE GLYCOL 3350 17 G/17G
17 POWDER, FOR SOLUTION ORAL DAILY
Refills: 0 | Status: DISCONTINUED | OUTPATIENT
Start: 2024-04-26 | End: 2024-04-27

## 2024-04-26 RX ORDER — LEVOTHYROXINE SODIUM 125 MCG
25 TABLET ORAL DAILY
Refills: 0 | Status: DISCONTINUED | OUTPATIENT
Start: 2024-04-26 | End: 2024-05-01

## 2024-04-26 RX ORDER — LANOLIN ALCOHOL/MO/W.PET/CERES
3 CREAM (GRAM) TOPICAL AT BEDTIME
Refills: 0 | Status: DISCONTINUED | OUTPATIENT
Start: 2024-04-26 | End: 2024-05-01

## 2024-04-26 RX ORDER — ENOXAPARIN SODIUM 100 MG/ML
40 INJECTION SUBCUTANEOUS EVERY 24 HOURS
Refills: 0 | Status: DISCONTINUED | OUTPATIENT
Start: 2024-04-26 | End: 2024-04-29

## 2024-04-26 RX ORDER — PANTOPRAZOLE SODIUM 20 MG/1
40 TABLET, DELAYED RELEASE ORAL
Refills: 0 | Status: DISCONTINUED | OUTPATIENT
Start: 2024-04-26 | End: 2024-05-01

## 2024-04-26 RX ORDER — POLYETHYLENE GLYCOL 3350 17 G/17G
17 POWDER, FOR SOLUTION ORAL ONCE
Refills: 0 | Status: COMPLETED | OUTPATIENT
Start: 2024-04-26 | End: 2024-04-26

## 2024-04-26 RX ADMIN — PANTOPRAZOLE SODIUM 40 MILLIGRAM(S): 20 TABLET, DELAYED RELEASE ORAL at 05:22

## 2024-04-26 RX ADMIN — Medication 25 MICROGRAM(S): at 05:22

## 2024-04-26 RX ADMIN — LOSARTAN POTASSIUM 50 MILLIGRAM(S): 100 TABLET, FILM COATED ORAL at 05:22

## 2024-04-26 RX ADMIN — SENNA PLUS 2 TABLET(S): 8.6 TABLET ORAL at 22:41

## 2024-04-26 RX ADMIN — POLYETHYLENE GLYCOL 3350 17 GRAM(S): 17 POWDER, FOR SOLUTION ORAL at 06:35

## 2024-04-26 RX ADMIN — ENOXAPARIN SODIUM 40 MILLIGRAM(S): 100 INJECTION SUBCUTANEOUS at 06:21

## 2024-04-26 NOTE — ED ADULT NURSE REASSESSMENT NOTE - NS ED NURSE REASSESS COMMENT FT1
pt resting in stretcher, awake and alert. pt offers no complaints at this time pt "feels relaxed". pt NSR on cardiac monitor, RR even and unlabored. Safety maintained. Plan of care ongoing.

## 2024-04-26 NOTE — H&P ADULT - PROBLEM SELECTOR PLAN 1
Presents with epigastric abdominal cramping radiating to RUQ, worsened with meals as well as at night/lying down and sometimes with bloating  - had prior upper endoscopy and colonoscopy performed with reportedly gastritis. Pt also reports was treated for H Pylori more than 10 years ago  - has been on PPIs in the past with benefit  - will trial PPI again, Maalox PRN Presents with epigastric abdominal cramping radiating to RUQ, worsened with meals as well as at night/lying down and sometimes with bloating  - had prior upper endoscopy and colonoscopy performed with reportedly gastritis. Pt also reports was treated for H Pylori more than 10 years ago  - has been on PPIs in the past with benefit  - will trial PPI again, Maalox PRN  - obtain collateral from PCP regarding CT abd/pelvis performed recently (pt does not know results)  - f/u lipase  - f/u US abdomen  - with reported 15 pound unintentional weight loss as well as BM changes and constipation; will trial bulk fibers and stool softeners and c/s GI  - GI emailed, f/u recs

## 2024-04-26 NOTE — H&P ADULT - NSHPLABSRESULTS_GEN_ALL_CORE
LABS:                          12.5   10.31 )-----------( 277      ( 25 Apr 2024 20:24 )             38.3     04-25    140  |  104  |  14  ----------------------------<  102<H>  4.0   |  23  |  0.55    Ca    9.3      25 Apr 2024 20:24    TPro  7.2  /  Alb  4.5  /  TBili  0.7  /  DBili  x   /  AST  16  /  ALT  21  /  AlkPhos  92  04-25    LIVER FUNCTIONS - ( 25 Apr 2024 20:24 )  Alb: 4.5 g/dL / Pro: 7.2 g/dL / ALK PHOS: 92 U/L / ALT: 21 U/L / AST: 16 U/L / GGT: x             Urinalysis Basic - ( 25 Apr 2024 20:24 )    Color: x / Appearance: x / SG: x / pH: x  Gluc: 102 mg/dL / Ketone: x  / Bili: x / Urobili: x   Blood: x / Protein: x / Nitrite: x   Leuk Esterase: x / RBC: x / WBC x   Sq Epi: x / Non Sq Epi: x / Bacteria: x    Troponin T 9  ProBNP <36  TSH 2.37

## 2024-04-26 NOTE — H&P ADULT - PROBLEM SELECTOR PLAN 3
Presents with intermittent episodes of leg tremulousness and pre-syncopal symptoms. Denies having heart palpitations or poor diet recently nor with LOC. Has undergone work-up in the past before with Cardiology including echo, stress test, and Holter for 4 days with Dr. Ch in 2021 which were reportedly normal  - no focal deficits on exam nor significant weakness currently. Tremulousness intermittently occurs and sometimes wakes patient up from sleep  - recently in ED unremarkable baseline EKG  - TTE on (4/7) unremarkable  - reports having undergone monitoring again for around 3-4 days outpatient without significant events  - orthostatics negative (was tested prior to fluids)  - per chart review, has also had some household anxiety; restless leg if occurring at night and waking pt up? add-on ferritin to assess  - PT c/s Presents with intermittent episodes of leg tremulousness and pre-syncopal symptoms. Denies having heart palpitations or poor diet recently nor with LOC. Has undergone work-up in the past before with Cardiology including echo, stress test, and Holter for 4 days with Dr. Ch in 2021 which were reportedly normal  - no focal deficits on exam nor significant weakness currently. Tremulousness only on legs and intermittently occurs and sometimes wakes patient up from sleep  - recently in ED unremarkable baseline EKG  - TTE on (4/7) unremarkable  - reports having undergone monitoring again for around 3-4 days outpatient without significant events  - orthostatics negative (was tested prior to fluids)  - per chart review, has also had some household anxiety; restless leg if occurring at night and waking pt up? add-on ferritin to assess  - PT c/s

## 2024-04-26 NOTE — H&P ADULT - ASSESSMENT
64F with PMH of HTN and hypothyroidism who presents with complaints of weakness/fatigue, abdominal pain, and unintentional weight loss of around 15-16 pounds, admitted for further work-up and management

## 2024-04-26 NOTE — CONSULT NOTE ADULT - SUBJECTIVE AND OBJECTIVE BOX
C A R D I O L O G Y  *********************    DATE OF SERVICE: 04-26-24    HISTORY OF PRESENT ILLNESS: HPI:  Patient is a 64 year-old female with history of HTN, hypothyroidism who presents with complaints of weakness/fatigue as well as unintentional weight loss of around 15-16 pounds.     Patient states that she was cooking dinner when her legs began to feel tremulous and she felt like she was going to pass out but denies heart palpitations at the time. Denies any chest pain, orthopnea or SOB. She reports having undergone work-up in the past which was negative for etiology. In regards to the weight loss, patient reports that she has had post-prandial abdominal cramping lately. Cramping is also worsened when lying down/going to sleep and she sometimes has sensation of bloating after meals. She also notes having very small BMs and constipation for the past month.  Patient has complaints of chronic constipation. She just had acolonoscopy that was done March 21, 2024 in which she had liquid stool and debris throughout the whole: so they  recommended that she has a repeat colonoscopy in 6 to 12 months with a two day prep.  A CAT scan of the abdomenand pelvis without contrast due to patient's refusal for contrast, was done April 19, 2024.:  which showed cholecystectomy, no other findings in the upper abdomen. 2.1 cm soft tissue density adjacent to the uterine fund this is probably a peduncle fundal fibroid. no other findings in the pelvis.    In the ED, patient with vitals T 98.2F, , /76, and O2 saturation of 100% on RA with RR 12. Labs relatively unremarkable. Received 1L NS bolus in the ED.   (26 Apr 2024 05:52)      PAST MEDICAL & SURGICAL HISTORY:  Episodic tension-type headache, not intractable  HTN   Hypothyroidism  S/P cholecystectomy      MEDICATIONS:  MEDICATIONS  (STANDING):  enoxaparin Injectable 40 milliGRAM(s) SubCutaneous every 24 hours  levothyroxine 25 MICROGram(s) Oral daily  losartan 50 milliGRAM(s) Oral daily  pantoprazole    Tablet 40 milliGRAM(s) Oral before breakfast  polyethylene glycol 3350 17 Gram(s) Oral daily  psyllium Powder 1 Packet(s) Oral daily  senna 2 Tablet(s) Oral at bedtime    HOME MEDS:  cyclobenzaprine 10 mg tablet 1 TABLET BID, 01/24/2024  levothyroxine 25 mcg tablet 1 TABLET DAILY, 01/24/2024  losartan 50 mg tablet 1 TABLET DAILY, 01/24/2024  naproxen 500 mg tablet 1 TABLET DAILY, 01/24/2024    Allergies: IV Contrast- rash    FAMILY HISTORY: FH: HTN (hypertension) (Mother)  Non-contributary for premature coronary disease or sudden cardiac death    SOCIAL HISTORY:    [X ] Non-smoker  [ ] Smoker  [ ] Alcohol    FLU VACCINE THIS YEAR STARTS IN AUGUST:  [ ] Yes    [ ] No    IF OVER 65 HAVE YOU EVER HAD A PNA VACCINE:  [ ] Yes    [ ] No       [ ] N/A      REVIEW OF SYSTEMS:  [ ]chest pain  [  ]shortness of breath  [  ]palpitations  [  ]syncope  [ ]near syncope [ ]upper extremity weakness   [ ] lower extremity weakness  [  ]diplopia  [  ]altered mental status   [  ]fevers  [ ]chills [ ]nausea  [ ]vomiting  [  ]dysphagia    [ ]abdominal pain  [ ]melena  [ ]BRBPR    [  ]epistaxis  [  ]rash    [ ]lower extremity edema    [X] All others negative	  [ ] Unable to obtain      LABS:	 	    CARDIAC MARKERS:                          12.2   6.73  )-----------( 245      ( 26 Apr 2024 06:44 )             37.1     Hb Trend: 12.2<--, 12.5<--    04-26    141  |  108<H>  |  9   ----------------------------<  83  4.0   |  23  |  0.44<L>    Ca    8.9      26 Apr 2024 06:44  Phos  3.2     04-26  Mg     2.10     04-26    TPro  7.2  /  Alb  4.5  /  TBili  0.7  /  DBili  x   /  AST  16  /  ALT  21  /  AlkPhos  92  04-25    Creatinine Trend: 0.44<--, 0.55<--, 0.45<--  TSH: Thyroid Stimulating Hormone, Serum: 2.37 uIU/mL (04-25 @ 20:24)      PHYSICAL EXAM:  T(C): 36.6 (04-26-24 @ 05:20), Max: 36.8 (04-25-24 @ 17:34)  HR: 63 (04-26-24 @ 05:20) (63 - 109)  BP: 127/75 (04-26-24 @ 05:20) (122/69 - 172/76)  RR: 17 (04-26-24 @ 05:20) (12 - 17)  SpO2: 100% (04-26-24 @ 05:20) (95% - 100%)  Wt(kg): --     I&O's Summary      Gen: NAD  HEENT:  (-)icterus (-)pallor  CV: N S1 S2 1/6 MATT (+)2 Pulses B/l  Resp:  Clear to auscultation B/L, normal effort  GI: (+) BS Soft, NT, ND  Lymph:  (-)Edema, (-)obvious lymphadenopathy  Skin: Warm to touch, Normal turgor  Psych: Appropriate mood and affect      TELEMETRY: 	      ECG:  	Sinus, Ainus Arrhymia    Stress 04/2023  Conclusions: Normal study  Normal maximal exercise treadmill stress test.  Fair exercise performance.  No evidence of stress induced ischemia.       LE Dopplers:  Conclusions: Normal study  Normal maximal exercise treadmill stress test.  Fair exercise performance.  No evidence of stress induced ischemia.       Conclusions:  Complete bilateral study performed.  No other hemodynamically significant stenoses are identified in the lower extremity  arterial system, bilaterally    TTE  06/2022  Findings:  1. Normal left ventricular size and function.  Mild diastolic dysfunction.  2. Normal left atrial size  3. Right atrial cavity is normal in size.  4. Normal right ventricular size and function.  5. Normal trileaflet aortic valve opening.  6. Normal mitral valve opening.  7. Normal appearing tricuspid valve with mild tricuspid  regurgitation.  8. Pulmonic valve is grossly normal, yet poorly visualized  with no doppler evidence for pulmonic stenosis.  9. No evidence of significant pericardial effusion.  10. The aortic root is normal.  11. Normal pulmonary artery.  12. IVC is normal with respiratory variation.  FULL STUDY DONE INCLUDING M-MODE RECORDING,  SPECTRAL DOPPLER AND COLOR FLOW  ECHOCARDIOGRAPHY.      ASSESSMENT/PLAN: 	Patient is a 64 year-old female with history of HTN, hypothyroidism who presents with complaints of weakness/fatigue as well as unintentional weight loss of around 15-16 pounds. Cardiology following for near syncope    Weakness/Near Syncope  - suspect due to orthostasis from decreased intake  - check orthostats  - c/w losartan for now  - maintain tele while ion hospital  - suspect might need EGD and possibly repeat c-scope    Suchet Pearson MD  Pager: [phone]

## 2024-04-26 NOTE — H&P ADULT - NSHPREVIEWOFSYSTEMS_GEN_ALL_CORE
REVIEW OF SYSTEMS:    CONSTITUTIONAL: No fevers. No chills. +Generalized weakness, +Presyncopal symptoms.  HEENT: No change in vision. No change in hearing. No sore throat.  CARDIOVASCULAR: No chest pain. No palpitations.  RESPIRATORY: No shortness of breath. No cough.  GASTROINTESTINAL: No nausea. No vomiting. +Abdominal pain, +Constipation, +Decreased stool size  GENITOURINARY:  No hematuria. No dysuria. No change in urination.   EXTREMITIES/SKIN: No rashes. No itching. No swelling.  MUSCULOSKELETAL: No muscle aches. No stiffness.  PSYCHIATRIC: No history of depression or anxiety.  NEUROLOGICAL: No headaches, dizziness. No change in bowel or bladder control.  ENDOCRINOLOGIC: No reports of cold or heat intolerance. No polydipsia.  ALLERGIES: +Intolerances as below

## 2024-04-26 NOTE — PHYSICAL THERAPY INITIAL EVALUATION ADULT - PERTINENT HX OF CURRENT PROBLEM, REHAB EVAL
64 year old female complaining of 1 month of 15-16lb unintentional weight loss and gradually worsening lightheadedness and fatigue.

## 2024-04-26 NOTE — PHYSICAL THERAPY INITIAL EVALUATION ADULT - SITTING BALANCE: STATIC
Called patient, discussed surgery, post-op course, expectations, follow up plan.    Reviewed H&P from -  3/16/22 cleared for surgery.   Labs - WNL     MRI done on 3/4/22 - in [facility]    To OR as planned. Check in - 3:30 PM    Nothing to eat or drink for 8 hours prior to surgery.    Bring all pertinent films to hospital the day of surgery.     Continue to refrain from NSAIDS (Ibuprofen, Aleve, Naprosyn), ASA, Over the counter herbal medications or supplements, anti-coagulants and blood thinners. Pt has held NSAIDs and Plavix for 7 days, will hold Metformin and Crestor the day before and day of surgery.     Shower Instructions: using a washcloth and a bottle of provided Hibiclens, wash your body, avoiding your face, hair, and genitals. Preferably, shower the night before surgery and the morning of surgery using a half a bottle each time for your whole body shower.    Patient confirmed they have help/assistance in place at home upon discharge.    Patient was informed that we will provide up to 1 week prescription of pain medication for post-operative pain. Pt is not a chronic pain patient.     Instructed patient about the following: After your surgery, if you will be staying in-patient, a nursing provider team will be monitoring you closely throughout your stay and communicate your health status to your surgeon and other providers.  You will be seen by Advanced Practice Providers (e.g., nurse practitioners, clinic nurse specialist, and physician assistants) who will check on you regularly to assess the status of your surgery.   All of pt's questions were answered to his satisfaction. He was informed that we will call after discharge to schedule all necessary post-op follow up appointments.     Jennifer Rosado RN          
good balance

## 2024-04-26 NOTE — CONSULT NOTE ADULT - ASSESSMENT
65yo w/ PMHx HTN, hypothyroidism presenting w/ weakness/fatigue and reports of weight loss up to 16lbs. GI is consulted for abodminal pain and weight loss.    #Weight loss, abdominal pain  Etiology of above unclear at this time. Possibly 2/2 malignany (given weight loss) vs. GOO (less likely as most meals go down ok) vs. IBD (though no diarrhea or hematochezia) vs. functional. given weight loss, should under EGD/colonoscopy. Given that last study was inconclusive, would repeat.   Recommendations:  -Possible EGD/Colon on Mon vs. Tues depending on availability  -please put on CLD on Sunday  -Supportive care per primary team; zofran  -ppi daily   -contact GI team on Sunday to discuss availability of scheduling on Monday   -miralax to prevent constipation  -Can trial simethicone for possible bloating    Note incomplete until finalized by attending signature/attestation.    Alexsandra Zimmerman  GI/Hepatology Fellow PGY5    NON-URGENT CONSULTS:  Please email [email or web address]  AT NIGHT AND ON WEEKENDS:  Available on Microsoft Teams  [phone] (Long Range Pager)    For urgent consults, please contact on call GI team. See Amion schedule ([facility]) or PSafeing system ([facility]) 65yo w/ PMHx HTN, hypothyroidism presenting w/ weakness/fatigue and reports of weight loss up to 16lbs. GI is consulted for abodminal pain and weight loss.    #Weight loss, abdominal pain  Etiology of above unclear at this time. Possibly 2/2 malignany (given weight loss) vs. GOO (less likely as most meals go down ok) vs. IBD (though no diarrhea or hematochezia) vs. functional. given weight loss, should under EGD/colonoscopy. Given that last study was inconclusive, would repeat.   Recommendations:  -Possible EGD/Colon on Mon vs. Tues depending on availability  -please put on CLD on Sunday  -Supportive care per primary team; zofran  -ppi daily   -contact GI team on Sunday to discuss availability of scheduling on Monday   -miralax to prevent constipation  -Can trial simethicone for possible bloating  -mesenteric doppler to eval for mesenteric ischemia    Note incomplete until finalized by attending signature/attestation.    Alexsandra Zimmerman  GI/Hepatology Fellow PGY5    NON-URGENT CONSULTS:  Please email [email or web address] OR [email or web address]  AT NIGHT AND ON WEEKENDS:  Available on Microsoft Teams  [phone] (Long Range Pager)    For urgent consults, please contact on call GI team. See Amion schedule ([facility]) or [employer] system ([facility]) 65yo w/ PMHx HTN, hypothyroidism presenting w/ weakness/fatigue and reports of weight loss up to 16lbs. GI is consulted for abodminal pain and weight loss.    #Weight loss, abdominal pain  Etiology of above unclear at this time. Possibly 2/2 malignany (given weight loss) vs. GOO (less likely as most meals go down ok) vs. IBD (though no diarrhea or hematochezia) vs. functional. given weight loss, should under EGD/colonoscopy. Given that last study was inconclusive, would repeat. She had poor prep last 2 colons so will need 2 day prep and clears.  Recommendations:  -Possible EGD/Colon on Tues to allow for 2 day prep  -please put on CLD over weekend  -2L moviprep on Sunday at noon  -followed by 4L golytelty on monday  -miralax BID until then  -Supportive care per primary team; zofran  -ppi daily   -contact GI team on Sunday to discuss availability of scheduling on Monday   -miralax to prevent constipation  -Can trial simethicone for possible bloating  -mesenteric doppler to eval for mesenteric ischemia    Note incomplete until finalized by attending signature/attestation.    Alexsandra Zimmerman  GI/Hepatology Fellow PGY5    NON-URGENT CONSULTS:  Please email [email or web address]  AT NIGHT AND ON WEEKENDS:  Available on Microsoft Teams  [phone] (Long Range Pager)    For urgent consults, please contact on call GI team. See Amion schedule ([facility]) or [employer] system ([facility])

## 2024-04-26 NOTE — PHYSICAL THERAPY INITIAL EVALUATION ADULT - ADDITIONAL COMMENTS
Patient lives in an apartment with her boyfriend with no stairs to enter. Patient was independent with ADLs and required no device for ambulation prior to admission

## 2024-04-26 NOTE — PATIENT PROFILE ADULT - PATIENT REPRESENTATIVE: ( YOU CAN CHOOSE ANY PERSON THAT CAN ASSIST YOU WITH YOUR HEALTH CARE PREFERENCES, DOES NOT HAVE TO BE A SPOUSE, IMMEDIATE FAMILY OR SIGNIFICANT OTHER/PARTNER)
Number Of Passes: 0 Price (Use Numbers Only, No Special Characters Or $): 99 Indication: skin texture Treatment Number: 1 Vacuum Pressure: 10 Detail Level: Zone Post-Care Instructions: I reviewed with the patient in detail post-care instructions. Patient should stay away from the sun and wear sun protection until treated areas are fully healed. Consent: Written consent obtained, risks reviewed including but not limited to crusting, scabbing, blistering, scarring, darker or lighter pigmentary change, bruising, and/or incomplete response. declines

## 2024-04-26 NOTE — CONSULT NOTE ADULT - ATTENDING COMMENTS
IMP: Weight loss, abdominal pain  Etiology of above unclear at this time. Possibly 2/2 malignany (given weight loss) vs. GOO (less likely as most meals go down ok) vs. IBD (though no diarrhea or hematochezia) vs. functional. given weight loss, should under EGD/colonoscopy. Given that last study was inconclusive, would repeat. She had poor prep last 2 colons so will need 2 day prep and clears.  Recommendations:  -Possible EGD/Colon on Tues to allow for 2 day prep  -please put on CLD over weekend  -2L moviprep on Sunday at noon  -followed by 4L golytelty on monday  -miralax BID until then  -Supportive care per primary team; zofran  -ppi daily   -contact GI team on Sunday to discuss availability of scheduling on Monday   -miralax to prevent constipation  -Can trial simethicone for possible bloating  -mesenteric doppler to eval for mesenteric ischemia

## 2024-04-26 NOTE — PATIENT PROFILE ADULT - DEAF OR HARD OF HEARING?
Hearing test results:  Passed     Anchira passed the [facility] hearing screen prior to discharge from the NICU.  Hearing was tested with Otoacoustic Emissions (OAE) which were normal in each ear for 2000 Hz, 3000 Hz, 4000 Hz, 6000 Hz and 8000 Hz.       There are no audiology recommendations at this time.           no

## 2024-04-26 NOTE — H&P ADULT - NSHPPHYSICALEXAM_GEN_ALL_CORE
VITALS:   T(C): 36.6 (04-26-24 @ 05:20), Max: 36.8 (04-25-24 @ 17:34)  HR: 63 (04-26-24 @ 05:20) (63 - 109)  BP: 127/75 (04-26-24 @ 05:20) (122/69 - 172/76)  RR: 17 (04-26-24 @ 05:20) (12 - 17)  SpO2: 100% (04-26-24 @ 05:20) (95% - 100%)    GENERAL: no acute distress  EYES: EOMI, PERRLA, conjunctiva and sclera clear  ENMT: moist oral mucosa  NECK: supple, no palpable masses  RESPIRATORY: lungs clear to ascultation b/l, unlabored respirations  CARDIOVASCULAR: regular rate and rhythm, no murmurs/rubs/gallops  ABDOMEN: soft, normal bowel sounds, nondistended, nontender to palpation  EXTREMITIES: no joint swelling or tenderness to palpation  PSYCH: anxious affect  NEUROLOGY: AAOx3, CNs grossly intact  SKIN: no rashes, no palpable lesions

## 2024-04-26 NOTE — CONSULT NOTE ADULT - SUBJECTIVE AND OBJECTIVE BOX
INITIAL GI CONSULTATION  HPI:  65yo w/ PMHx HTN, hypothyroidism presenting w/ weakness/fatigue and reports of weight loss up to 16lbs. GI is consulted for abodminal pain and weight loss.    She reports she was in [facility] until a year ago when she started to develop thin stools with RUQ abdominal cramping while having a BM. The symptoms reportedly self resolved up until about 3 months ago when they started again. Now reports all of her stools are very thin and she sometimes strains to go. She also notes a 16lb weight loss. Her abdominal pain is described as right sided and cramping in nature, at times resolved when having a BM. She has not seen any blood in stool.  she says that she started to feel nauseous a week ago.   Patient is a 64 year-old female with history of HTN and hypothyroidism who presents with complaints of weakness/fatigue as well as unintentional weight loss of around 15-16 pounds. Patient states that she was cooking dinner when her legs began to feel tremulous and she felt like she was going to pass out but denies heart palpitations at the time. She reports having undergone work-up in the past which was negative for etiology. In regards to the weight loss, patient reports that she has had post-prandial abdominal cramping lately. Cramping is also worsened when lying down/going to sleep and she sometimes has sensation of bloating after meals. She also notes having very small BMs and constipation for the past month. Last BM was yesterday but significantly decreased in size and patient also reports not passing gas as often. She had a colonoscopy scheduled for earlier this month but reportedly was told by her gastroenterologist that the procedure couldn't be performed due to stool burden. As a result, she switched to another GI practice with scheduled first visit in around 2 weeks. Otherwise states her diet has been good and regular but endorses around 15 pound unintentional weight loss.    In the ED, patient with vitals T 98.2F, , /76, and O2 saturation of 100% on RA with RR 12. Labs relatively unremarkable. Received 1L NS bolus in the ED.   (2024 05:52)        ASA/NSAIDs/anticoagulation:     Labs/Imaging reviewed.                        12.2       6.73  )-----------( 245      ( 2024 06:44 )             37.1     Last Hb:Hemoglobin: 12.2 g/dL (24 @ 06:44)  Hemoglobin: 12.5 g/dL (24 @ 20:24)               141   |  108   |  9                  Ca: 8.9    BMP:   ----------------------------< 83     M.10  (24 @ 06:44)             4.0    |  23    | 0.44               Ph: 3.2      LFT:     TPro: 7.2 / Alb: 4.5 / TBili: 0.7 / DBili: x / AST: 16 / ALT: 21 / AlkPhos: 92   (24 @ 20:24)    Creatinine: 0.44 mg/dL  Creatinine: 0.55 mg/dL      BUN/Cr: Blood Urea Nitrogen: 9 mg/dL (24 @ 06:44)  /Creatinine: 0.44 mg/dL (24 @ 06:44)    AST/ALTAspartate Aminotransferase (AST/SGOT): 16 U/L (24 @ 20:24)  /Alanine Aminotransferase (ALT/SGPT): 21 U/L (24 @ 20:24)    ALP Alkaline Phosphatase: 92 U/L (24 @ 20:24)    T/Dbili /  INR:     EGD/[place]:      Imaging:  US Abdomen Complete:   ACC: [id number] EXAM:  US ABDOMEN COMPLETE   ORDERED BY: MIKAELA RICHARD     PROCEDURE DATE:  2024          INTERPRETATION:  CLINICAL INFORMATION: Abdominal pain, epigastric pain   radiating to right upper quadrant.    COMPARISON: CT abdomen pelvis2022    TECHNIQUE: Sonography of the abdomen.    FINDINGS:  Liver: Increased echogenicity.  Bile ducts: Stable common bile duct dilatation measuring 11 mm, may be   related to the postcholecystectomy state.  Gallbladder: Cholecystectomy.  Pancreas: Visualized portions are within normal limits.  Spleen: Not visualized.  Right kidney: 10.5 cm. No hydronephrosis.  Left kidney: 10.6 cm. No hydronephrosis.  Ascites: None.  Aorta and IVC: Visualized portions are within normal limits.    IMPRESSION:  Hepatic steatosis.        --- End of Report ---            MELISSA MAHONEY MD; Attending Radiologist  This document has been electronically signed. 2024 10:51AM (24 @ 08:35)      FamHx: ***no h/o GI malignancies known  PMH/PSH:  PAST MEDICAL & SURGICAL HISTORY:  Episodic tension-type headache, not intractable      HTN (hypertension)      Hypothyroidism      S/P cholecystectomy          MEDS:  MEDICATIONS  (STANDING):  enoxaparin Injectable 40 milliGRAM(s) SubCutaneous every 24 hours  levothyroxine 25 MICROGram(s) Oral daily  losartan 50 milliGRAM(s) Oral daily  pantoprazole    Tablet 40 milliGRAM(s) Oral before breakfast  polyethylene glycol 3350 17 Gram(s) Oral daily  psyllium Powder 1 Packet(s) Oral daily  senna 2 Tablet(s) Oral at bedtime    MEDICATIONS  (PRN):  acetaminophen     Tablet .. 650 milliGRAM(s) Oral every 6 hours PRN Temp greater or equal to 38C (100.4F), Mild Pain (1 - 3)  aluminum hydroxide/magnesium hydroxide/simethicone Suspension 30 milliLiter(s) Oral every 6 hours PRN Dyspepsia  melatonin 3 milliGRAM(s) Oral at bedtime PRN Insomnia    Allergies    IV Contrast (Unknown)    Intolerances          ______________________________________________________________________  PHYSICAL EXAM:  T(C): 36.6 (24 @ 05:20), Max: 36.8 (24 @ 17:34)  HR: 63 (24 @ 05:20)  BP: 127/75 (24 @ 05:20)  RR: 17 (24 @ 05:20)  SpO2: 100% (24 @ 05:20)  Wt(kg): --    GEN: NAD, normocephalic  CVS: Normal rate, HD stable  CHEST: No signs of respiratory distress, breathing comfortably, no accessory muscle usage  ABD: soft , nontender, nondistended, bowel sounds present  EXTR: no cyanosis, no clubbing, no edema  NEURO: Awake and alert, conversant  SKIN:  warm;  non icteric     INITIAL GI CONSULTATION  HPI:  65yo w/ PMHx HTN, hypothyroidism presenting w/ weakness/fatigue and reports of weight loss up to 16lbs. GI is consulted for abodminal pain and weight loss.    She reports she was in [facility] until a year ago when she started to develop thin stools with RUQ abdominal cramping while having a BM. The symptoms reportedly self resolved up until about 3 months ago when they started again. Now reports all of her stools are very thin and she sometimes strains to go. She also notes a 16lb weight loss. Her abdominal pain is described as right sided and cramping in nature, at times resolved when having a BM. She has not seen any blood in stool.  she says that she started to feel nauseous a few weeks ago which is partly why she has lost some weight. Saw outpt GI and reportedly had a colonoscopy 2 months ago but prep was poor despite 2 day prep. recent outpt CT A/P reportedly within normal limits.     Has remained HD stable. Labs showed normal CBC, normal BMP. RUQ U/S showed stable CBD 11mm (may be 2/2 cholecystectomy), hepatic steatosis    FamHx: ***no h/o GI malignancies known  PMH/PSH:  PAST MEDICAL & SURGICAL HISTORY:  Episodic tension-type headache, not intractable      HTN (hypertension)      Hypothyroidism      S/P cholecystectomy          MEDS:  MEDICATIONS  (STANDING):  enoxaparin Injectable 40 milliGRAM(s) SubCutaneous every 24 hours  levothyroxine 25 MICROGram(s) Oral daily  losartan 50 milliGRAM(s) Oral daily  pantoprazole    Tablet 40 milliGRAM(s) Oral before breakfast  polyethylene glycol 3350 17 Gram(s) Oral daily  psyllium Powder 1 Packet(s) Oral daily  senna 2 Tablet(s) Oral at bedtime    MEDICATIONS  (PRN):  acetaminophen     Tablet .. 650 milliGRAM(s) Oral every 6 hours PRN Temp greater or equal to 38C (100.4F), Mild Pain (1 - 3)  aluminum hydroxide/magnesium hydroxide/simethicone Suspension 30 milliLiter(s) Oral every 6 hours PRN Dyspepsia  melatonin 3 milliGRAM(s) Oral at bedtime PRN Insomnia    Allergies    IV Contrast (Unknown)    Intolerances          ______________________________________________________________________  PHYSICAL EXAM:  T(C): 36.6 (24 @ 05:20), Max: 36.8 (24 @ 17:34)  HR: 63 (24 @ 05:20)  BP: 127/75 (24 @ 05:20)  RR: 17 (24 @ 05:20)  SpO2: 100% (24 @ 05:20)  Wt(kg): --    GEN: NAD, normocephalic  CVS: Normal rate, HD stable  CHEST: No signs of respiratory distress, breathing comfortably, no accessory muscle usage  ABD: soft , nontender, nondistended, bowel sounds present  EXTR: no cyanosis, no clubbing, no edema  NEURO: Awake and alert, conversant  SKIN:  warm;  non icteric      Labs/Imaging reviewed.                        12.2       6.73  )-----------( 245      ( 2024 06:44 )             37.1     Last Hb:Hemoglobin: 12.2 g/dL (24 @ 06:44)  Hemoglobin: 12.5 g/dL (24 @ 20:24)               141   |  108   |  9                  Ca: 8.9    BMP:   ----------------------------< 83     M.10  (24 @ 06:44)             4.0    |  23    | 0.44               Ph: 3.2      LFT:     TPro: 7.2 / Alb: 4.5 / TBili: 0.7 / DBili: x / AST: 16 / ALT: 21 / AlkPhos: 92   (24 @ 20:24)    Creatinine: 0.44 mg/dL  Creatinine: 0.55 mg/dL      BUN/Cr: Blood Urea Nitrogen: 9 mg/dL (24 @ 06:44)  /Creatinine: 0.44 mg/dL (24 @ 06:44)    AST/ALTAspartate Aminotransferase (AST/SGOT): 16 U/L (04-25-24 @ 20:24)  /Alanine Aminotransferase (ALT/SGPT): 21 U/L (24 @ 20:24)    ALP Alkaline Phosphatase: 92 U/L (24 @ 20:24)    T/Dbili /  INR:     EGD/[place]:      Imaging:  US Abdomen Complete:   ACC: [id number] EXAM:  US ABDOMEN COMPLETE   ORDERED BY: MIKAELA RICHARD     PROCEDURE DATE:  2024          INTERPRETATION:  CLINICAL INFORMATION: Abdominal pain, epigastric pain   radiating to right upper quadrant.    COMPARISON: CT abdomen pelvis2022    TECHNIQUE: Sonography of the abdomen.    FINDINGS:  Liver: Increased echogenicity.  Bile ducts: Stable common bile duct dilatation measuring 11 mm, may be   related to the postcholecystectomy state.  Gallbladder: Cholecystectomy.  Pancreas: Visualized portions are within normal limits.  Spleen: Not visualized.  Right kidney: 10.5 cm. No hydronephrosis.  Left kidney: 10.6 cm. No hydronephrosis.  Ascites: None.  Aorta and IVC: Visualized portions are within normal limits.    IMPRESSION:  Hepatic steatosis.        --- End of Report ---

## 2024-04-26 NOTE — H&P ADULT - HISTORY OF PRESENT ILLNESS
Patient is a 64 year-old female with history of HTN and hypothyroidism who presents with complaints of weakness/fatigue as well as unintentional weight loss of around 15-16 pounds. Patient states that she was cooking dinner when her legs began to feel tremulous and she felt like she was going to pass out but denies heart palpitations at the time. She reports having undergone work-up in the past which was negative for etiology. In regards to the weight loss, patient reports that she has had post-prandial abdominal cramping lately. Cramping is also worsened when lying down/going to sleep and she sometimes has sensation of bloating after meals. She also notes having very small BMs and constipation for the past month. Last BM was yesterday but significantly decreased in size and patient also reports not passing gas as often. She had a colonoscopy scheduled for earlier this month but reportedly was told by her gastroenterologist that the procedure couldn't be performed due to stool burden. As a result, she switched to another GI practice with scheduled first visit in around 2 weeks. Otherwise states her diet has been good and regular but endorses around 15 pound unintentional weight loss.    In the ED, patient with vitals T 98.2F, , /76, and O2 saturation of 100% on RA with RR 12. Labs relatively unremarkable. Received 1L NS bolus in the ED.

## 2024-04-27 ENCOUNTER — RESULT REVIEW (OUTPATIENT)
Age: 64
End: 2024-04-27

## 2024-04-27 DIAGNOSIS — R63.8 OTHER SYMPTOMS AND SIGNS CONCERNING FOOD AND FLUID INTAKE: ICD-10-CM

## 2024-04-27 DIAGNOSIS — K59.00 CONSTIPATION, UNSPECIFIED: ICD-10-CM

## 2024-04-27 LAB
ANION GAP SERPL CALC-SCNC: 10 MMOL/L — SIGNIFICANT CHANGE UP (ref 7–14)
BUN SERPL-MCNC: 13 MG/DL — SIGNIFICANT CHANGE UP (ref 7–23)
CALCIUM SERPL-MCNC: 9 MG/DL — SIGNIFICANT CHANGE UP (ref 8.4–10.5)
CHLORIDE SERPL-SCNC: 104 MMOL/L — SIGNIFICANT CHANGE UP (ref 98–107)
CO2 SERPL-SCNC: 26 MMOL/L — SIGNIFICANT CHANGE UP (ref 22–31)
CREAT SERPL-MCNC: 0.46 MG/DL — LOW (ref 0.5–1.3)
EGFR: 107 ML/MIN/1.73M2 — SIGNIFICANT CHANGE UP
GLUCOSE SERPL-MCNC: 99 MG/DL — SIGNIFICANT CHANGE UP (ref 70–99)
HCT VFR BLD CALC: 39.4 % — SIGNIFICANT CHANGE UP (ref 34.5–45)
HGB BLD-MCNC: 12.7 G/DL — SIGNIFICANT CHANGE UP (ref 11.5–15.5)
MAGNESIUM SERPL-MCNC: 2.1 MG/DL — SIGNIFICANT CHANGE UP (ref 1.6–2.6)
MCHC RBC-ENTMCNC: 31.2 PG — SIGNIFICANT CHANGE UP (ref 27–34)
MCHC RBC-ENTMCNC: 32.2 GM/DL — SIGNIFICANT CHANGE UP (ref 32–36)
MCV RBC AUTO: 96.8 FL — SIGNIFICANT CHANGE UP (ref 80–100)
NRBC # BLD: 0 /100 WBCS — SIGNIFICANT CHANGE UP (ref 0–0)
NRBC # FLD: 0 K/UL — SIGNIFICANT CHANGE UP (ref 0–0)
PHOSPHATE SERPL-MCNC: 3.5 MG/DL — SIGNIFICANT CHANGE UP (ref 2.5–4.5)
PLATELET # BLD AUTO: 263 K/UL — SIGNIFICANT CHANGE UP (ref 150–400)
POTASSIUM SERPL-MCNC: 4 MMOL/L — SIGNIFICANT CHANGE UP (ref 3.5–5.3)
POTASSIUM SERPL-SCNC: 4 MMOL/L — SIGNIFICANT CHANGE UP (ref 3.5–5.3)
RBC # BLD: 4.07 M/UL — SIGNIFICANT CHANGE UP (ref 3.8–5.2)
RBC # FLD: 11.9 % — SIGNIFICANT CHANGE UP (ref 10.3–14.5)
SODIUM SERPL-SCNC: 140 MMOL/L — SIGNIFICANT CHANGE UP (ref 135–145)
WBC # BLD: 7.07 K/UL — SIGNIFICANT CHANGE UP (ref 3.8–10.5)
WBC # FLD AUTO: 7.07 K/UL — SIGNIFICANT CHANGE UP (ref 3.8–10.5)

## 2024-04-27 PROCEDURE — 93978 VASCULAR STUDY: CPT | Mod: 26

## 2024-04-27 PROCEDURE — 99232 SBSQ HOSP IP/OBS MODERATE 35: CPT

## 2024-04-27 RX ORDER — SIMETHICONE 80 MG/1
80 TABLET, CHEWABLE ORAL EVERY 8 HOURS
Refills: 0 | Status: DISCONTINUED | OUTPATIENT
Start: 2024-04-27 | End: 2024-04-27

## 2024-04-27 RX ORDER — SOD SULF/SODIUM/NAHCO3/KCL/PEG
4000 SOLUTION, RECONSTITUTED, ORAL ORAL ONCE
Refills: 0 | Status: DISCONTINUED | OUTPATIENT
Start: 2024-04-29 | End: 2024-04-29

## 2024-04-27 RX ORDER — SODIUM CHLORIDE 9 MG/ML
1000 INJECTION, SOLUTION INTRAVENOUS
Refills: 0 | Status: DISCONTINUED | OUTPATIENT
Start: 2024-04-27 | End: 2024-04-29

## 2024-04-27 RX ORDER — SIMETHICONE 80 MG/1
80 TABLET, CHEWABLE ORAL THREE TIMES A DAY
Refills: 0 | Status: DISCONTINUED | OUTPATIENT
Start: 2024-04-27 | End: 2024-05-01

## 2024-04-27 RX ORDER — SODIUM CHLORIDE 9 MG/ML
1000 INJECTION, SOLUTION INTRAVENOUS
Refills: 0 | Status: DISCONTINUED | OUTPATIENT
Start: 2024-04-27 | End: 2024-04-27

## 2024-04-27 RX ORDER — POLYETHYLENE GLYCOL 3350 17 G/17G
17 POWDER, FOR SOLUTION ORAL
Refills: 0 | Status: DISCONTINUED | OUTPATIENT
Start: 2024-04-27 | End: 2024-05-01

## 2024-04-27 RX ADMIN — PANTOPRAZOLE SODIUM 40 MILLIGRAM(S): 20 TABLET, DELAYED RELEASE ORAL at 07:11

## 2024-04-27 RX ADMIN — Medication 1 PACKET(S): at 11:09

## 2024-04-27 RX ADMIN — SIMETHICONE 80 MILLIGRAM(S): 80 TABLET, CHEWABLE ORAL at 21:19

## 2024-04-27 RX ADMIN — ENOXAPARIN SODIUM 40 MILLIGRAM(S): 100 INJECTION SUBCUTANEOUS at 07:11

## 2024-04-27 RX ADMIN — SODIUM CHLORIDE 100 MILLILITER(S): 9 INJECTION, SOLUTION INTRAVENOUS at 15:56

## 2024-04-27 RX ADMIN — SENNA PLUS 2 TABLET(S): 8.6 TABLET ORAL at 21:19

## 2024-04-27 RX ADMIN — POLYETHYLENE GLYCOL 3350 17 GRAM(S): 17 POWDER, FOR SOLUTION ORAL at 11:09

## 2024-04-27 RX ADMIN — Medication 25 MICROGRAM(S): at 07:11

## 2024-04-27 RX ADMIN — SODIUM CHLORIDE 75 MILLILITER(S): 9 INJECTION, SOLUTION INTRAVENOUS at 07:46

## 2024-04-27 RX ADMIN — POLYETHYLENE GLYCOL 3350 17 GRAM(S): 17 POWDER, FOR SOLUTION ORAL at 18:10

## 2024-04-27 NOTE — PROGRESS NOTE ADULT - SUBJECTIVE AND OBJECTIVE BOX
DATE OF SERVICE: 04-27-24    Patient denies chest pain or shortness of breath.   Review of symptoms otherwise negative.    MEDICATIONS:  acetaminophen     Tablet .. 650 milliGRAM(s) Oral every 6 hours PRN  aluminum hydroxide/magnesium hydroxide/simethicone Suspension 30 milliLiter(s) Oral every 6 hours PRN  enoxaparin Injectable 40 milliGRAM(s) SubCutaneous every 24 hours  lactated ringers. 1000 milliLiter(s) IV Continuous <Continuous>  levothyroxine 25 MICROGram(s) Oral daily  losartan 50 milliGRAM(s) Oral daily  melatonin 3 milliGRAM(s) Oral at bedtime PRN  pantoprazole    Tablet 40 milliGRAM(s) Oral before breakfast  polyethylene glycol 3350 17 Gram(s) Oral daily  psyllium Powder 1 Packet(s) Oral daily  senna 2 Tablet(s) Oral at bedtime  simethicone 80 milliGRAM(s) Chew every 8 hours PRN      LABS:                        12.7   7.07  )-----------( 263      ( 27 Apr 2024 05:59 )             39.4       Hemoglobin: 12.7 g/dL (04-27 @ 05:59)  Hemoglobin: 12.2 g/dL (04-26 @ 06:44)  Hemoglobin: 12.5 g/dL (04-25 @ 20:24)      04-27    140  |  104  |  13  ----------------------------<  99  4.0   |  26  |  0.46<L>    Ca    9.0      27 Apr 2024 05:59  Phos  3.5     04-27  Mg     2.10     04-27    TPro  7.2  /  Alb  4.5  /  TBili  0.7  /  DBili  x   /  AST  16  /  ALT  21  /  AlkPhos  92  04-25    Creatinine Trend: 0.46<--, 0.44<--, 0.55<--, 0.45<--    COAGS:           PHYSICAL EXAM:  T(C): 36.4 (04-27-24 @ 11:36), Max: 36.6 (04-26-24 @ 21:30)  HR: 61 (04-27-24 @ 11:36) (61 - 66)  BP: 113/68 (04-27-24 @ 11:36) (98/56 - 123/63)  RR: 18 (04-27-24 @ 11:36) (16 - 18)  SpO2: 100% (04-27-24 @ 11:36) (99% - 100%)  Wt(kg): --    General:  Alert and Oriented * 3.   Head: Normocephalic and atraumatic.   Neck: No JVD. No bruits. Supple. Does not appear to be enlarged.   Cardiovascular: + S1,S2 ; RRR Soft systolic murmur at the left lower sternal border. No rubs noted.    Lungs: CTA b/l. No rhonchi, rales or wheezes.   Abdomen: + BS, soft. Non tender. Non distended. No rebound. No guarding.   Extremities: No clubbing/cyanosis/edema.   Neurologic: Moves all four extremities. Full range of motion.   Skin: Warm and moist. The patient's skin has normal elasticity and good skin turgor.   Psychiatric: Appropriate mood and affect.  Musculoskeletal: Normal range of motion, normal strength     I&O's Summary      TELEMETRY: NSR	      ECG:  	Sinus, Sinus Arrhymia    Stress 04/2023  Conclusions: Normal study  Normal maximal exercise treadmill stress test.  Fair exercise performance.  No evidence of stress induced ischemia.       LE Dopplers:  Conclusions: Normal study  Normal maximal exercise treadmill stress test.  Fair exercise performance.  No evidence of stress induced ischemia.       Conclusions:  Complete bilateral study performed.  No other hemodynamically significant stenoses are identified in the lower extremity  arterial system, bilaterally    TTE  06/2022  Findings:  1. Normal left ventricular size and function.  Mild diastolic dysfunction.  2. Normal left atrial size  3. Right atrial cavity is normal in size.  4. Normal right ventricular size and function.  5. Normal trileaflet aortic valve opening.  6. Normal mitral valve opening.  7. Normal appearing tricuspid valve with mild tricuspid  regurgitation.  8. Pulmonic valve is grossly normal, yet poorly visualized  with no doppler evidence for pulmonic stenosis.  9. No evidence of significant pericardial effusion.  10. The aortic root is normal.  11. Normal pulmonary artery.  12. IVC is normal with respiratory variation.  FULL STUDY DONE INCLUDING M-MODE RECORDING,  SPECTRAL DOPPLER AND COLOR FLOW  ECHOCARDIOGRAPHY.      ASSESSMENT/PLAN: 	Patient is a 64 year-old female with history of HTN, hypothyroidism who presents with complaints of weakness/fatigue as well as unintentional weight loss of around 15-16 pounds. Cardiology following for near syncope    Weakness/Near Syncope  - suspect due to orthostasis from decreased intake  - check orthostats  - c/w losartan for now  - maintain tele while in hospital  - EGD/C-Scope next week    Lina Pearson MD  Pager: [phone]

## 2024-04-27 NOTE — PROGRESS NOTE ADULT - PROBLEM SELECTOR PLAN 6
- F: mIVF LR 100cc/h x24h  - E: replete K<4, Mg<2  - N: CLD  - D: lovenox 40mg q24h  - G: protonix 40mg daily    code: full  dispo: pending medical optimization; PT recs home no needs

## 2024-04-27 NOTE — PROGRESS NOTE ADULT - PROBLEM SELECTOR PLAN 2
- pt p/w 4d constipation, change to stool caliber, abd pain, and weight loss as above  - GI following, tentative plan for EGD/c-scope 4/30  - start CLD  - plan for moviprep 2L 4/29 and golytely 4L 4/30   - start miralax BID   - simethicone as above

## 2024-04-27 NOTE — PROGRESS NOTE ADULT - SUBJECTIVE AND OBJECTIVE BOX
[facility] Department of Hospital Medicine  Belle Dodd DO  Available on MS Teams  Pager: [phone]    Patient is a 64y old  Female who presents with a chief complaint of Weakness, constipation (27 Apr 2024 14:43)    Subjective:  Pt seen and examined at bedside resting comfortably, about to get up to use the bathroom. Still no BM. Denies complaints. Hoping for scope Tuesday.    VITAL SIGNS:  T(C): 36.4 (04-27-24 @ 11:36), Max: 36.6 (04-26-24 @ 21:30)  T(F): 97.6 (04-27-24 @ 11:36), Max: 97.9 (04-27-24 @ 07:00)  HR: 61 (04-27-24 @ 11:36) (61 - 66)  BP: 113/68 (04-27-24 @ 11:36) (98/56 - 123/63)  BP(mean): --  RR: 18 (04-27-24 @ 11:36) (16 - 18)  SpO2: 100% (04-27-24 @ 11:36) (99% - 100%)  Wt(kg): --    PHYSICAL EXAM:  Constitutional: resting comfortably in bed; NAD  Head: NC/AT  Eyes: PERRL, EOMI, anicteric sclera  ENT: no nasal discharge; MMM  Neck: supple; no JVD  Respiratory: CTA B/L; no W/R/R  Cardiac: +S1/S2; RRR; no M/R/G  Gastrointestinal: soft, NT/ND; no rebound or guarding; +BSx4  Extremities: WWP, no clubbing or cyanosis; no peripheral edema  Musculoskeletal: NROM x4; no joint swelling, tenderness or erythema  Vascular: 2+ radial, DP/PT pulses B/L  Dermatologic: skin warm, dry and intact; no rashes, wounds, or scars  Neurologic: AAOx3; CNII-XII grossly intact; no focal deficits  Psychiatric: affect and characteristics of appearance, verbalizations, behaviors are appropriate    MEDICATIONS  (STANDING):  enoxaparin Injectable 40 milliGRAM(s) SubCutaneous every 24 hours  levothyroxine 25 MICROGram(s) Oral daily  losartan 50 milliGRAM(s) Oral daily  pantoprazole    Tablet 40 milliGRAM(s) Oral before breakfast  polyethylene glycol 3350 17 Gram(s) Oral two times a day  senna 2 Tablet(s) Oral at bedtime  simethicone 80 milliGRAM(s) Chew three times a day    MEDICATIONS  (PRN):  acetaminophen     Tablet .. 650 milliGRAM(s) Oral every 6 hours PRN Temp greater or equal to 38C (100.4F), Mild Pain (1 - 3)  aluminum hydroxide/magnesium hydroxide/simethicone Suspension 30 milliLiter(s) Oral every 6 hours PRN Dyspepsia  melatonin 3 milliGRAM(s) Oral at bedtime PRN Insomnia    LABS:                        12.7   7.07  )-----------( 263      ( 27 Apr 2024 05:59 )             39.4     04-27    140  |  104  |  13  ----------------------------<  99  4.0   |  26  |  0.46<L>    Ca    9.0      27 Apr 2024 05:59  Phos  3.5     04-27  Mg     2.10     04-27    TPro  7.2  /  Alb  4.5  /  TBili  0.7  /  DBili  x   /  AST  16  /  ALT  21  /  AlkPhos  92  04-25      Urinalysis Basic - ( 27 Apr 2024 05:59 )    Color: x / Appearance: x / SG: x / pH: x  Gluc: 99 mg/dL / Ketone: x  / Bili: x / Urobili: x   Blood: x / Protein: x / Nitrite: x   Leuk Esterase: x / RBC: x / WBC x   Sq Epi: x / Non Sq Epi: x / Bacteria: x      CAPILLARY BLOOD GLUCOSE          RADIOLOGY & ADDITIONAL TESTS: Reviewed.

## 2024-04-27 NOTE — PROGRESS NOTE ADULT - ASSESSMENT
Pt is a 63 yo F with PMH HTN and hypothyroidism p/w BL LE weakness, SOB, LH, fatigue, and unintentional weight loss (15#) x1mo. Has undergone 2x unsuccessful c-scopes in this time period outpt. Outpt CT a/p and TTE benign. On arrival, hemodynamically stable with EKG ST and orthostatics neg. Labs largely unremarkable. US abd showing hepatic steatosis. GI following, recommending CLD and 2d prep with tentative plan for EGD/c-scope 4/30.

## 2024-04-28 PROCEDURE — 99232 SBSQ HOSP IP/OBS MODERATE 35: CPT

## 2024-04-28 RX ADMIN — Medication 25 MICROGRAM(S): at 05:14

## 2024-04-28 RX ADMIN — PANTOPRAZOLE SODIUM 40 MILLIGRAM(S): 20 TABLET, DELAYED RELEASE ORAL at 06:12

## 2024-04-28 RX ADMIN — SIMETHICONE 80 MILLIGRAM(S): 80 TABLET, CHEWABLE ORAL at 06:14

## 2024-04-28 RX ADMIN — Medication 2000 MILLILITER(S): at 15:08

## 2024-04-28 RX ADMIN — SIMETHICONE 80 MILLIGRAM(S): 80 TABLET, CHEWABLE ORAL at 22:00

## 2024-04-28 RX ADMIN — SENNA PLUS 2 TABLET(S): 8.6 TABLET ORAL at 21:53

## 2024-04-28 RX ADMIN — LOSARTAN POTASSIUM 50 MILLIGRAM(S): 100 TABLET, FILM COATED ORAL at 06:13

## 2024-04-28 RX ADMIN — POLYETHYLENE GLYCOL 3350 17 GRAM(S): 17 POWDER, FOR SOLUTION ORAL at 17:59

## 2024-04-28 RX ADMIN — ENOXAPARIN SODIUM 40 MILLIGRAM(S): 100 INJECTION SUBCUTANEOUS at 06:15

## 2024-04-28 RX ADMIN — POLYETHYLENE GLYCOL 3350 17 GRAM(S): 17 POWDER, FOR SOLUTION ORAL at 06:13

## 2024-04-28 NOTE — DIETITIAN INITIAL EVALUATION ADULT - ADD RECOMMEND
1. Gradually advance PO diet as tolerated at medical team's discretion  2. Please obtain height measurements for current admission   3. Monitor weights, labs, BM's, skin integrity, p.o. intake, diet progression  4. Please monitor % PO intake on flowsheets   5. Defer fluid management to medical team   6. Consider food allergy testing as able, refer outpatient if necessary

## 2024-04-28 NOTE — DIETITIAN INITIAL EVALUATION ADULT - OTHER INFO
Patient is a 64y Female with PMH HTN and hypothyroidism who presents to [facility] with complaints of weakness/fatigue, abdominal pain, and unintentional weight loss of around 15-16lb.    Patient is currently on a clear liquids diet, pending EGD/colonoscopy as per GI documentation. Patient reports tolerance to clear liquids thus far. Expressed preference for low sugar, savory items; added chicken broth (lunch/dinner) and sugar-free juices via CBORD accordingly. Patient denies any chewing or swallowing difficulty with regular solids or thin liquids. No report of nausea, vomiting, diarrhea. Patient complains of constipation, reports no BMs x4 days. No BMs noted per RN flowsheet documentation. Noted to be on a bowel regimen. Noted provision of IV hydration (lactated ringers) as per medication list    Writer provided verbal education regarding current diet order (clear liquids). Patient verbalized understanding to the discussion.

## 2024-04-28 NOTE — DIETITIAN INITIAL EVALUATION ADULT - ORAL INTAKE PTA/DIET HISTORY
Patient reports no known food allergies, however notes she has trouble when consuming red pepper, eggplant, sweet potato, papaya, gluten...? Reports she has not been tested for food allergies or Celiac Disease. Patient denies any chewing or swallowing difficulty with regular solids or thin liquids. Patient reports poor PO intake x2 months secondary to decreased appetite and early satiety. Reports consuming small portioned meals of various foods (i.e. eggs, dark leafy greens, soups, fish), likely meeting <75% estimated energy needs     No height documentation noted for current admission. Noted height per [facility] HIE as 170.2cm (12/15/22)  Patient reports usual body weight 180lb, endorses 17lb (9%) weight loss x2 months period of time (significant)  Per [facility], noted the following weight history: 71.7kg (4/25). No other weight history data available for review within the previous calendar year.

## 2024-04-28 NOTE — PROGRESS NOTE ADULT - PROBLEM SELECTOR PLAN 2
- pt p/w 4d constipation, change to stool caliber, abd pain, and weight loss as above  - GI following, tentative plan for EGD/c-scope 4/30  - start CLD  - plan for moviprep 2L 4/29 and golytely 4L 4/30   - c/w miralax BID   - simethicone as above - pt p/w 4d constipation, change to stool caliber, abd pain, and weight loss as above  - GI following, tentative plan for EGD/c-scope 4/29  - start CLD  - plan for moviprep 2L 4/28 and golytely 4L 4/29  - c/w miralax BID   - simethicone as above

## 2024-04-28 NOTE — PROGRESS NOTE ADULT - SUBJECTIVE AND OBJECTIVE BOX
[facility] of Hospital Medicine  Belle Dodd DO  Available on MS Teams  Pager: [phone]    Patient is a 64y old  Female who presents with a chief complaint of Weakness, constipation (27 Apr 2024 14:43)    Subjective:  Pt seen and examined at bedside resting comfortably. Feels well today. Still no BM. Plans to start prep this afternoon.    Vital Signs Last 24 Hrs  T(C): 36.8 (28 Apr 2024 09:46), Max: 36.8 (27 Apr 2024 18:13)  T(F): 98.2 (28 Apr 2024 09:46), Max: 98.2 (27 Apr 2024 18:13)  HR: 73 (28 Apr 2024 09:46) (63 - 73)  BP: 128/73 (28 Apr 2024 09:46) (113/72 - 131/79)  BP(mean): --  RR: 15 (28 Apr 2024 09:46) (15 - 18)  SpO2: 97% (28 Apr 2024 09:46) (97% - 100%)    Parameters below as of 28 Apr 2024 09:46  Patient On (Oxygen Delivery Method): room air    PHYSICAL EXAM:  Constitutional: resting comfortably in bed; NAD  Head: NC/AT  Eyes: PERRL, EOMI, anicteric sclera  ENT: no nasal discharge; MMM  Neck: supple; no JVD  Respiratory: CTA B/L; no W/R/R  Cardiac: +S1/S2; RRR; no M/R/G  Gastrointestinal: soft, NT/ND; no rebound or guarding; +BSx4  Extremities: WWP, no clubbing or cyanosis; no peripheral edema  Musculoskeletal: NROM x4; no joint swelling, tenderness or erythema  Vascular: 2+ radial, DP/PT pulses B/L  Dermatologic: skin warm, dry and intact; no rashes, wounds, or scars  Neurologic: AAOx3; CNII-XII grossly intact; no focal deficits  Psychiatric: affect and characteristics of appearance, verbalizations, behaviors are appropriate    MEDICATIONS  (STANDING):  enoxaparin Injectable 40 milliGRAM(s) SubCutaneous every 24 hours  lactated ringers. 1000 milliLiter(s) (100 mL/Hr) IV Continuous <Continuous>  levothyroxine 25 MICROGram(s) Oral daily  losartan 50 milliGRAM(s) Oral daily  pantoprazole    Tablet 40 milliGRAM(s) Oral before breakfast  polyethylene glycol 3350 17 Gram(s) Oral two times a day  polyethylene glycol/electrolyte Solution 2000 milliLiter(s) Oral once  senna 2 Tablet(s) Oral at bedtime  simethicone 80 milliGRAM(s) Chew three times a day    MEDICATIONS  (PRN):  acetaminophen     Tablet .. 650 milliGRAM(s) Oral every 6 hours PRN Temp greater or equal to 38C (100.4F), Mild Pain (1 - 3)  aluminum hydroxide/magnesium hydroxide/simethicone Suspension 30 milliLiter(s) Oral every 6 hours PRN Dyspepsia  melatonin 3 milliGRAM(s) Oral at bedtime PRN Insomnia    LABS:                        12.7   7.07  )-----------( 263      ( 27 Apr 2024 05:59 )             39.4     04-27    140  |  104  |  13  ----------------------------<  99  4.0   |  26  |  0.46<L>    Ca    9.0      27 Apr 2024 05:59  Phos  3.5     04-27  Mg     2.10     04-27        Urinalysis Basic - ( 27 Apr 2024 05:59 )    Color: x / Appearance: x / SG: x / pH: x  Gluc: 99 mg/dL / Ketone: x  / Bili: x / Urobili: x   Blood: x / Protein: x / Nitrite: x   Leuk Esterase: x / RBC: x / WBC x   Sq Epi: x / Non Sq Epi: x / Bacteria: x      CAPILLARY BLOOD GLUCOSE          RADIOLOGY & ADDITIONAL TESTS: Reviewed.

## 2024-04-28 NOTE — PROGRESS NOTE ADULT - ASSESSMENT
Pt is a 65 yo F with PMH HTN and hypothyroidism p/w BL LE weakness, SOB, LH, fatigue, and unintentional weight loss (15#) x1mo. Has undergone 2x unsuccessful c-scopes in this time period outpt. Outpt CT a/p and TTE benign. On arrival, hemodynamically stable with EKG ST and orthostatics neg. Labs largely unremarkable. US abd showing hepatic steatosis. GI following, recommending CLD and 2d prep with tentative plan for EGD/c-scope 4/30.  0

## 2024-04-28 NOTE — DIETITIAN NUTRITION RISK NOTIFICATION - ADDITIONAL COMMENTS/DIETITIAN RECOMMENDATIONS
Please see Dietitian Initial Assessment for complete recommendations.     Mariama Lopez MS RDN CDN  On Microsoft Teams, Pager #[phone]

## 2024-04-28 NOTE — DIETITIAN INITIAL EVALUATION ADULT - PERTINENT MEDS FT
MEDICATIONS  (STANDING):  enoxaparin Injectable 40 milliGRAM(s) SubCutaneous every 24 hours  lactated ringers. 1000 milliLiter(s) (100 mL/Hr) IV Continuous <Continuous>  levothyroxine 25 MICROGram(s) Oral daily  losartan 50 milliGRAM(s) Oral daily  pantoprazole    Tablet 40 milliGRAM(s) Oral before breakfast  polyethylene glycol 3350 17 Gram(s) Oral two times a day  polyethylene glycol/electrolyte Solution 2000 milliLiter(s) Oral once  senna 2 Tablet(s) Oral at bedtime  simethicone 80 milliGRAM(s) Chew three times a day    MEDICATIONS  (PRN):  acetaminophen     Tablet .. 650 milliGRAM(s) Oral every 6 hours PRN Temp greater or equal to 38C (100.4F), Mild Pain (1 - 3)  aluminum hydroxide/magnesium hydroxide/simethicone Suspension 30 milliLiter(s) Oral every 6 hours PRN Dyspepsia  melatonin 3 milliGRAM(s) Oral at bedtime PRN Insomnia

## 2024-04-28 NOTE — DIETITIAN INITIAL EVALUATION ADULT - PERTINENT LABORATORY DATA
04-27    140  |  104  |  13  ----------------------------<  99  4.0   |  26  |  0.46<L>    Ca    9.0      27 Apr 2024 05:59  Phos  3.5     04-27  Mg     2.10     04-27

## 2024-04-28 NOTE — PROGRESS NOTE ADULT - SUBJECTIVE AND OBJECTIVE BOX
DATE OF SERVICE: 04-28-24    Patient denies chest pain or shortness of breath.   Review of symptoms otherwise negative.    MEDICATIONS:  acetaminophen     Tablet .. 650 milliGRAM(s) Oral every 6 hours PRN  aluminum hydroxide/magnesium hydroxide/simethicone Suspension 30 milliLiter(s) Oral every 6 hours PRN  enoxaparin Injectable 40 milliGRAM(s) SubCutaneous every 24 hours  lactated ringers. 1000 milliLiter(s) IV Continuous <Continuous>  levothyroxine 25 MICROGram(s) Oral daily  losartan 50 milliGRAM(s) Oral daily  melatonin 3 milliGRAM(s) Oral at bedtime PRN  pantoprazole    Tablet 40 milliGRAM(s) Oral before breakfast  polyethylene glycol 3350 17 Gram(s) Oral two times a day  polyethylene glycol/electrolyte Solution 2000 milliLiter(s) Oral once  senna 2 Tablet(s) Oral at bedtime  simethicone 80 milliGRAM(s) Chew three times a day      LABS:                        12.7   7.07  )-----------( 263      ( 27 Apr 2024 05:59 )             39.4       Hemoglobin: 12.7 g/dL (04-27 @ 05:59)  Hemoglobin: 12.2 g/dL (04-26 @ 06:44)  Hemoglobin: 12.5 g/dL (04-25 @ 20:24)      04-27    140  |  104  |  13  ----------------------------<  99  4.0   |  26  |  0.46<L>    Ca    9.0      27 Apr 2024 05:59  Phos  3.5     04-27  Mg     2.10     04-27      Creatinine Trend: 0.46<--, 0.44<--, 0.55<--, 0.45<--    COAGS:           PHYSICAL EXAM:  T(C): 36.8 (04-28-24 @ 09:46), Max: 36.8 (04-27-24 @ 18:13)  HR: 73 (04-28-24 @ 09:46) (63 - 73)  BP: 128/73 (04-28-24 @ 09:46) (113/72 - 131/79)  RR: 15 (04-28-24 @ 09:46) (15 - 18)  SpO2: 97% (04-28-24 @ 09:46) (97% - 100%)  Wt(kg): --    I&O's Summary      General:  Alert and Oriented * 3.   Head: Normocephalic and atraumatic.   Neck: No JVD. No bruits. Supple. Does not appear to be enlarged.   Cardiovascular: + S1,S2 ; RRR Soft systolic murmur at the left lower sternal border. No rubs noted.    Lungs: CTA b/l. No rhonchi, rales or wheezes.   Abdomen: + BS, soft. Non tender. Non distended. No rebound. No guarding.   Extremities: No clubbing/cyanosis/edema.   Neurologic: Moves all four extremities. Full range of motion.   Skin: Warm and moist. The patient's skin has normal elasticity and good skin turgor.   Psychiatric: Appropriate mood and affect.  Musculoskeletal: Normal range of motion, normal strength     I&O's Summary      TELEMETRY: NSR	      ECG:  	Sinus, Sinus Arrhymia    Stress 04/2023  Conclusions: Normal study  Normal maximal exercise treadmill stress test.  Fair exercise performance.  No evidence of stress induced ischemia.       LE Dopplers:  Conclusions: Normal study  Normal maximal exercise treadmill stress test.  Fair exercise performance.  No evidence of stress induced ischemia.       Conclusions:  Complete bilateral study performed.  No other hemodynamically significant stenoses are identified in the lower extremity  arterial system, bilaterally    TTE  06/2022  Findings:  1. Normal left ventricular size and function.  Mild diastolic dysfunction.  2. Normal left atrial size  3. Right atrial cavity is normal in size.  4. Normal right ventricular size and function.  5. Normal trileaflet aortic valve opening.  6. Normal mitral valve opening.  7. Normal appearing tricuspid valve with mild tricuspid  regurgitation.  8. Pulmonic valve is grossly normal, yet poorly visualized  with no doppler evidence for pulmonic stenosis.  9. No evidence of significant pericardial effusion.  10. The aortic root is normal.  11. Normal pulmonary artery.  12. IVC is normal with respiratory variation.  FULL STUDY DONE INCLUDING M-MODE RECORDING,  SPECTRAL DOPPLER AND COLOR FLOW  ECHOCARDIOGRAPHY.      ASSESSMENT/PLAN: 	Patient is a 64 year-old female with history of HTN, hypothyroidism who presents with complaints of weakness/fatigue as well as unintentional weight loss of around 15-16 pounds. Cardiology following for near syncope    Weakness/Near Syncope  - suspect due to orthostasis from decreased intake  - check orthostats  - c/w losartan for now  - maintain tele while in hospital  - EGD/C-Scope next week    Lina Pearson MD  Pager: [phone]

## 2024-04-29 LAB
ANION GAP SERPL CALC-SCNC: 11 MMOL/L — SIGNIFICANT CHANGE UP (ref 7–14)
BUN SERPL-MCNC: 6 MG/DL — LOW (ref 7–23)
CALCIUM SERPL-MCNC: 8.4 MG/DL — SIGNIFICANT CHANGE UP (ref 8.4–10.5)
CHLORIDE SERPL-SCNC: 106 MMOL/L — SIGNIFICANT CHANGE UP (ref 98–107)
CO2 SERPL-SCNC: 23 MMOL/L — SIGNIFICANT CHANGE UP (ref 22–31)
CREAT SERPL-MCNC: 0.43 MG/DL — LOW (ref 0.5–1.3)
EGFR: 109 ML/MIN/1.73M2 — SIGNIFICANT CHANGE UP
GLUCOSE SERPL-MCNC: 79 MG/DL — SIGNIFICANT CHANGE UP (ref 70–99)
HCT VFR BLD CALC: 35.7 % — SIGNIFICANT CHANGE UP (ref 34.5–45)
HGB BLD-MCNC: 11.5 G/DL — SIGNIFICANT CHANGE UP (ref 11.5–15.5)
MAGNESIUM SERPL-MCNC: 2.1 MG/DL — SIGNIFICANT CHANGE UP (ref 1.6–2.6)
MCHC RBC-ENTMCNC: 31.2 PG — SIGNIFICANT CHANGE UP (ref 27–34)
MCHC RBC-ENTMCNC: 32.2 GM/DL — SIGNIFICANT CHANGE UP (ref 32–36)
MCV RBC AUTO: 96.7 FL — SIGNIFICANT CHANGE UP (ref 80–100)
NRBC # BLD: 0 /100 WBCS — SIGNIFICANT CHANGE UP (ref 0–0)
NRBC # FLD: 0 K/UL — SIGNIFICANT CHANGE UP (ref 0–0)
PHOSPHATE SERPL-MCNC: 3.4 MG/DL — SIGNIFICANT CHANGE UP (ref 2.5–4.5)
PLATELET # BLD AUTO: 218 K/UL — SIGNIFICANT CHANGE UP (ref 150–400)
POTASSIUM SERPL-MCNC: 3.7 MMOL/L — SIGNIFICANT CHANGE UP (ref 3.5–5.3)
POTASSIUM SERPL-SCNC: 3.7 MMOL/L — SIGNIFICANT CHANGE UP (ref 3.5–5.3)
RBC # BLD: 3.69 M/UL — LOW (ref 3.8–5.2)
RBC # FLD: 11.9 % — SIGNIFICANT CHANGE UP (ref 10.3–14.5)
SODIUM SERPL-SCNC: 140 MMOL/L — SIGNIFICANT CHANGE UP (ref 135–145)
WBC # BLD: 4.91 K/UL — SIGNIFICANT CHANGE UP (ref 3.8–10.5)
WBC # FLD AUTO: 4.91 K/UL — SIGNIFICANT CHANGE UP (ref 3.8–10.5)

## 2024-04-29 PROCEDURE — 99232 SBSQ HOSP IP/OBS MODERATE 35: CPT | Mod: GC

## 2024-04-29 PROCEDURE — 99232 SBSQ HOSP IP/OBS MODERATE 35: CPT

## 2024-04-29 RX ORDER — POTASSIUM CHLORIDE 20 MEQ
20 PACKET (EA) ORAL ONCE
Refills: 0 | Status: DISCONTINUED | OUTPATIENT
Start: 2024-04-29 | End: 2024-04-30

## 2024-04-29 RX ORDER — POTASSIUM CHLORIDE 20 MEQ
20 PACKET (EA) ORAL ONCE
Refills: 0 | Status: COMPLETED | OUTPATIENT
Start: 2024-04-29 | End: 2024-04-29

## 2024-04-29 RX ORDER — SOD SULF/SODIUM/NAHCO3/KCL/PEG
1000 SOLUTION, RECONSTITUTED, ORAL ORAL ONCE
Refills: 0 | Status: COMPLETED | OUTPATIENT
Start: 2024-04-29 | End: 2024-04-29

## 2024-04-29 RX ORDER — SOD SULF/SODIUM/NAHCO3/KCL/PEG
4000 SOLUTION, RECONSTITUTED, ORAL ORAL ONCE
Refills: 0 | Status: COMPLETED | OUTPATIENT
Start: 2024-04-29 | End: 2024-04-29

## 2024-04-29 RX ADMIN — ENOXAPARIN SODIUM 40 MILLIGRAM(S): 100 INJECTION SUBCUTANEOUS at 06:07

## 2024-04-29 RX ADMIN — SIMETHICONE 80 MILLIGRAM(S): 80 TABLET, CHEWABLE ORAL at 21:41

## 2024-04-29 RX ADMIN — SIMETHICONE 80 MILLIGRAM(S): 80 TABLET, CHEWABLE ORAL at 06:16

## 2024-04-29 RX ADMIN — PANTOPRAZOLE SODIUM 40 MILLIGRAM(S): 20 TABLET, DELAYED RELEASE ORAL at 06:15

## 2024-04-29 RX ADMIN — SIMETHICONE 80 MILLIGRAM(S): 80 TABLET, CHEWABLE ORAL at 13:40

## 2024-04-29 RX ADMIN — Medication 30 MILLILITER(S): at 20:03

## 2024-04-29 RX ADMIN — Medication 25 MICROGRAM(S): at 06:08

## 2024-04-29 RX ADMIN — Medication 20 MILLIEQUIVALENT(S): at 09:43

## 2024-04-29 RX ADMIN — POLYETHYLENE GLYCOL 3350 17 GRAM(S): 17 POWDER, FOR SOLUTION ORAL at 06:09

## 2024-04-29 RX ADMIN — Medication 1000 MILLILITER(S): at 09:44

## 2024-04-29 RX ADMIN — Medication 4000 MILLILITER(S): at 17:13

## 2024-04-29 NOTE — PROGRESS NOTE ADULT - ASSESSMENT
65yo w/ PMHx HTN, hypothyroidism presenting w/ weakness/fatigue and reports of weight loss up to 16lbs. GI is consulted for abodminal pain and weight loss.    #Weight loss, abdominal pain  Etiology of above unclear at this time. Possibly 2/2 malignany (given weight loss) vs. GOO (less likely as most meals go down ok) vs. IBD (though no diarrhea or hematochezia) vs. functional. given weight loss, should under EGD/colonoscopy. Given that last study was inconclusive, would repeat. She had poor prep last 2 colons so will need 2 day prep and clears. S/p day one of prep, planning for EGD/Colon tomorrow.  Recommendations:  - Clear liquid diet   - 4L of golytely at 6PM on the day prior to colonoscopy.   - Primary team to check BM at 5am, if stool is not watery/ clear, please give additional 1-2L of golytely to be finished by 7am  - NPO after midnight; hold any TFs  - 3AM preop labs night prior to procedure: CBC, BMP, T&S, Coags  - If potassium repletion needed, please give IV repletion only   -Supportive care per primary team; zofran  -ppi daily   -Can trial simethicone for possible bloating  -mesenteric doppler to eval for mesenteric ischemia    Note incomplete until finalized by attending signature/attestation.    Alexsandra Zimmerman  GI/Hepatology Fellow PGY5    NON-URGENT CONSULTS:  Please email [email or web address] OR [email or web address]  AT NIGHT AND ON WEEKENDS:  Available on Microsoft Teams  [phone] ([place] Pager)    For urgent consults, please contact on call GI team. See Amion schedule ([facility]) or ITao paging system ([facility])

## 2024-04-29 NOTE — PROGRESS NOTE ADULT - ASSESSMENT
64F PMH HTN and hypothyroidism p/w BL LE weakness, SOB, fatigue, unintentional weight loss, decreased stool caliber x1mo with 2x unsuccessful c-scopes as an o/p admitted for EGD and c-scope, planned 4/30.

## 2024-04-29 NOTE — PROGRESS NOTE ADULT - PROBLEM SELECTOR PLAN 6
- F: none  - E: replete K<4, Mg<2  - N: CLD  - D: lovenox 40mg q24h  - G: protonix 40mg daily    code: full  dispo: pending medical optimization; PT recs home no needs - F: none  - E: replete K<4, Mg<2  - N: CLD  - D: lovenox 40mg q24h - held for c-scope  - G: protonix 40mg daily    code: full  dispo: pending medical optimization; PT recs home no needs

## 2024-04-29 NOTE — PROGRESS NOTE ADULT - SUBJECTIVE AND OBJECTIVE BOX
Interval Events:   -A bit uncomfortable today. Had 2000ml moviprep now w/ yellow clear stools    Hospital Medications:  acetaminophen     Tablet .. 650 milliGRAM(s) Oral every 6 hours PRN  aluminum hydroxide/magnesium hydroxide/simethicone Suspension 30 milliLiter(s) Oral every 6 hours PRN  enoxaparin Injectable 40 milliGRAM(s) SubCutaneous every 24 hours  lactated ringers. 1000 milliLiter(s) (100 mL/Hr) IV Continuous <Continuous>  levothyroxine 25 MICROGram(s) Oral daily  losartan 50 milliGRAM(s) Oral daily  melatonin 3 milliGRAM(s) Oral at bedtime PRN  pantoprazole    Tablet 40 milliGRAM(s) Oral before breakfast  polyethylene glycol 3350 17 Gram(s) Oral two times a day  polyethylene glycol/electrolyte Solution. 4000 milliLiter(s) Oral once  potassium chloride   Powder 20 milliEquivalent(s) Oral once  senna 2 Tablet(s) Oral at bedtime  simethicone 80 milliGRAM(s) Chew three times a day        24 @ 07:01  -  24 @ 07:00  --------------------------------------------------------  IN: 240 mL / OUT: 0 mL / NET: 240 mL          PHYSICAL EXAM:   Vital Signs:  Vital Signs Last 24 Hrs  T(C): 36.5 (2024 09:03), Max: 36.7 (2024 07:03)  T(F): 97.7 (2024 09:03), Max: 98 (2024 07:03)  HR: 62 (2024 09:03) (56 - 80)  BP: 117/71 (2024 09:03) (109/64 - 124/76)  BP(mean): --  RR: 17 (2024 09:03) (16 - 17)  SpO2: 99% (2024 09:03) (98% - 99%)    Parameters below as of 2024 09:03  Patient On (Oxygen Delivery Method): room air      Daily Height in cm: 160.02 (2024 15:40)    Daily   GENERAL:  NAD, Appears stated age  HEENT:  NC/AT,  conjunctivae clear and pink, sclera -anicteric  CHEST:  Normal Effort, no signs of resp distress  HEART:  RRR, HD stable  ABDOMEN:  Soft, non-tender, non-distended  EXTREMITIES:  no cyanosis or edema  SKIN:  Warm & Dry. No rash or erythema  NEURO:  Alert, oriented, no focal deficit  LABS:                        11.5   4.91  )-----------( 218      ( 2024 05:14 )             35.7     Last Hb:Hemoglobin: 11.5 g/dL (24 @ 05:14)  Hemoglobin: 12.7 g/dL (24 @ 05:59)  Hemoglobin: 12.2 g/dL (24 @ 06:44)               140   |  106   |  6                  Ca: 8.4    BMP:   ----------------------------< 79     M.10  (24 @ 05:14)             3.7    |  23    | 0.43               Ph: 3.4      LFT:     TPro: 7.2 / Alb: 4.5 / TBili: 0.7 / DBili: x / AST: 16 / ALT: 21 / AlkPhos: 92   (24 @ 20:24)    Creatinine: 0.43 mg/dL  Creatinine: 0.46 mg/dL  Creatinine: 0.44 mg/dL          Urinalysis Basic - ( 2024 05:14 )    Color: x / Appearance: x / SG: x / pH: x  Gluc: 79 mg/dL / Ketone: x  / Bili: x / Urobili: x   Blood: x / Protein: x / Nitrite: x   Leuk Esterase: x / RBC: x / WBC x   Sq Epi: x / Non Sq Epi: x / Bacteria: x

## 2024-04-29 NOTE — PROGRESS NOTE ADULT - SUBJECTIVE AND OBJECTIVE BOX
Clif Anthony MD  Division of Hospitalist Medicine  Pager [phone]  Available on Teams    CC: Patient is a 64y old  Female who presents with a chief complaint of Weakness, constipation (29 Apr 2024 14:52)        SUBJECTIVE / INTERVAL HPI: Patient seen and examined at bedside. Pt grossly denies any new complaints.     ROS: negative unless otherwise stated above.    VITAL SIGNS:  Vital Signs Last 24 Hrs  T(C): 36.5 (29 Apr 2024 09:03), Max: 36.7 (29 Apr 2024 07:03)  T(F): 97.7 (29 Apr 2024 09:03), Max: 98 (29 Apr 2024 07:03)  HR: 62 (29 Apr 2024 09:03) (56 - 66)  BP: 117/71 (29 Apr 2024 09:03) (109/64 - 124/76)  BP(mean): --  RR: 17 (29 Apr 2024 09:03) (16 - 17)  SpO2: 99% (29 Apr 2024 09:03) (98% - 99%)    Parameters below as of 29 Apr 2024 09:03  Patient On (Oxygen Delivery Method): room air          04-28-24 @ 07:01  -  04-29-24 @ 07:00  --------------------------------------------------------  IN: 240 mL / OUT: 0 mL / NET: 240 mL        PHYSICAL EXAM:    General: NAD  HEENT: MMM  Neck: supple  Cardiovascular: +S1/S2; RRR  Respiratory: CTA B/L; no W/R/R  Gastrointestinal: soft, NT/ND  Extremities: WWP; no edema, clubbing or cyanosis  Neurological: awake and alert; communicating and following commands without appreciated deficits    MEDICATIONS:  MEDICATIONS  (STANDING):  enoxaparin Injectable 40 milliGRAM(s) SubCutaneous every 24 hours  levothyroxine 25 MICROGram(s) Oral daily  losartan 50 milliGRAM(s) Oral daily  pantoprazole    Tablet 40 milliGRAM(s) Oral before breakfast  polyethylene glycol 3350 17 Gram(s) Oral two times a day  polyethylene glycol/electrolyte Solution. 4000 milliLiter(s) Oral once  potassium chloride   Powder 20 milliEquivalent(s) Oral once  senna 2 Tablet(s) Oral at bedtime  simethicone 80 milliGRAM(s) Chew three times a day    MEDICATIONS  (PRN):  acetaminophen     Tablet .. 650 milliGRAM(s) Oral every 6 hours PRN Temp greater or equal to 38C (100.4F), Mild Pain (1 - 3)  aluminum hydroxide/magnesium hydroxide/simethicone Suspension 30 milliLiter(s) Oral every 6 hours PRN Dyspepsia  melatonin 3 milliGRAM(s) Oral at bedtime PRN Insomnia      ALLERGIES:  Allergies    IV Contrast (Unknown)    Intolerances        LABS:                        11.5   4.91  )-----------( 218      ( 29 Apr 2024 05:14 )             35.7     04-29    140  |  106  |  6<L>  ----------------------------<  79  3.7   |  23  |  0.43<L>    Ca    8.4      29 Apr 2024 05:14  Phos  3.4     04-29  Mg     2.10     04-29        Urinalysis Basic - ( 29 Apr 2024 05:14 )    Color: x / Appearance: x / SG: x / pH: x  Gluc: 79 mg/dL / Ketone: x  / Bili: x / Urobili: x   Blood: x / Protein: x / Nitrite: x   Leuk Esterase: x / RBC: x / WBC x   Sq Epi: x / Non Sq Epi: x / Bacteria: x      CAPILLARY BLOOD GLUCOSE          RADIOLOGY & ADDITIONAL TESTS: Reviewed.

## 2024-04-29 NOTE — PROGRESS NOTE ADULT - TIME BILLING
review of laboratory data, radiology results, consultants' recommendations, documentation in [place], discussion with patient/ACP and interdisciplinary staff (such as , social workers, etc). Interventions were performed as documented above.
review of laboratory data, radiology results, consultants' recommendations, documentation in [place], discussion with patient/ACP and interdisciplinary staff (such as , social workers, etc). Interventions were performed as documented above.

## 2024-04-29 NOTE — PROGRESS NOTE ADULT - SUBJECTIVE AND OBJECTIVE BOX
DATE OF SERVICE: 04-29-24    Patient denies chest pain or shortness of breath.   Review of symptoms otherwise negative.    Review of Systems:   Constitutional: [ ] fevers, [ ] chills.   Skin: [ ] dry skin. [ ] rashes.  Psychiatric: [ ] depression, [ ] anxiety.   Gastrointestinal: [ ] BRBPR, [ ] melena.   Neurological: [ ] confusion. [ ] seizures. [ ] shuffling gait.   Ears,Nose,Mouth and Throat: [ ] ear pain [ ] sore throat.   Eyes: [ ] diplopia.   Respiratory: [ ] hemoptysis. [ ] shortness of breath  Cardiovascular: See HPI above  Hematologic/Lymphatic: [ ] anemia. [ ] painful nodes. [ ] prolonged bleeding.   Genitourinary: [ ] hematuria. [ ] flank pain.   Endocrine: [ ] significant change in weight. [ ] intolerance to heat and cold.     Review of systems [x ] otherwise negative, [ ] otherwise unable to obtain    FH: no family history of sudden cardiac death in first degree relatives    SH: [ ] tobacco, [ ] alcohol, [ ] drugs    acetaminophen     Tablet .. 650 milliGRAM(s) Oral every 6 hours PRN  aluminum hydroxide/magnesium hydroxide/simethicone Suspension 30 milliLiter(s) Oral every 6 hours PRN  enoxaparin Injectable 40 milliGRAM(s) SubCutaneous every 24 hours  lactated ringers. 1000 milliLiter(s) IV Continuous <Continuous>  levothyroxine 25 MICROGram(s) Oral daily  losartan 50 milliGRAM(s) Oral daily  melatonin 3 milliGRAM(s) Oral at bedtime PRN  pantoprazole    Tablet 40 milliGRAM(s) Oral before breakfast  polyethylene glycol 3350 17 Gram(s) Oral two times a day  polyethylene glycol/electrolyte Solution. 4000 milliLiter(s) Oral once  potassium chloride   Powder 20 milliEquivalent(s) Oral once  senna 2 Tablet(s) Oral at bedtime  simethicone 80 milliGRAM(s) Chew three times a day                            11.5   4.91  )-----------( 218      ( 29 Apr 2024 05:14 )             35.7     140  |  106  |  6<L>  ----------------------------<  79  3.7   |  23  |  0.43<L>    Ca    8.4      29 Apr 2024 05:14  Phos  3.4     04-29  Mg     2.10     04-29      T(C): 36.5 (04-29-24 @ 09:03), Max: 36.7 (04-29-24 @ 07:03)  HR: 62 (04-29-24 @ 09:03) (56 - 80)  BP: 117/71 (04-29-24 @ 09:03) (109/64 - 124/76)  RR: 17 (04-29-24 @ 09:03) (16 - 17)  SpO2: 99% (04-29-24 @ 09:03) (98% - 99%)  Wt(kg): --    I&O's Summary    28 Apr 2024 07:01  -  29 Apr 2024 07:00  --------------------------------------------------------  IN: 240 mL / OUT: 0 mL / NET: 240 mL      General:  Alert and Oriented * 3.   Head: Normocephalic and atraumatic.   Neck: No JVD. No bruits. Supple. Does not appear to be enlarged.   Cardiovascular: + S1,S2 ; RRR Soft systolic murmur at the left lower sternal border. No rubs noted.    Lungs: CTA b/l. No rhonchi, rales or wheezes.   Abdomen: + BS, soft. Non tender. Non distended. No rebound. No guarding.   Extremities: No clubbing/cyanosis/edema.   Neurologic: Moves all four extremities. Full range of motion.   Skin: Warm and moist. The patient's skin has normal elasticity and good skin turgor.   Psychiatric: Appropriate mood and affect.  Musculoskeletal: Normal range of motion, normal strength     TELEMETRY: NSR	      ECG:  	Sinus, Sinus Arrhymia    Stress 04/2023  Conclusions: Normal study  Normal maximal exercise treadmill stress test.  Fair exercise performance.  No evidence of stress induced ischemia.       LE Dopplers:  Conclusions: Normal study  Normal maximal exercise treadmill stress test.  Fair exercise performance.  No evidence of stress induced ischemia.       Conclusions:  Complete bilateral study performed.  No other hemodynamically significant stenoses are identified in the lower extremity  arterial system, bilaterally    TTE  06/2022  Findings:  1. Normal left ventricular size and function.  Mild diastolic dysfunction.  2. Normal left atrial size  3. Right atrial cavity is normal in size.  4. Normal right ventricular size and function.  5. Normal trileaflet aortic valve opening.  6. Normal mitral valve opening.  7. Normal appearing tricuspid valve with mild tricuspid  regurgitation.  8. Pulmonic valve is grossly normal, yet poorly visualized  with no doppler evidence for pulmonic stenosis.  9. No evidence of significant pericardial effusion.  10. The aortic root is normal.  11. Normal pulmonary artery.  12. IVC is normal with respiratory variation.  FULL STUDY DONE INCLUDING M-MODE RECORDING,  SPECTRAL DOPPLER AND COLOR FLOW  ECHOCARDIOGRAPHY.      ASSESSMENT/PLAN: 	Patient is a 64 year-old female with history of HTN, hypothyroidism who presents with complaints of weakness/fatigue as well as unintentional weight loss of around 15-16 pounds. Cardiology following for near syncope    Weakness/Near Syncope  - suspect due to orthostasis from decreased intake  - check orthostats  - c/w losartan for now  - maintain tele while in hospital  - EGD/C-Scope planned this week  --prior stress and echo as above, pt with no high risk features, no anginal sx, CHF, arrhythmias or severe valvular disease no further work up planned prior to procedures, optimized from CV perspective    Kristen GAMEZ  [phone]          DATE OF SERVICE: 04-29-24    Patient denies chest pain or shortness of breath.   Review of symptoms otherwise negative.    Review of Systems:   Constitutional: [ ] fevers, [ ] chills.   Skin: [ ] dry skin. [ ] rashes.  Psychiatric: [ ] depression, [ ] anxiety.   Gastrointestinal: [ ] BRBPR, [ ] melena.   Neurological: [ ] confusion. [ ] seizures. [ ] shuffling gait.   Ears,Nose,Mouth and Throat: [ ] ear pain [ ] sore throat.   Eyes: [ ] diplopia.   Respiratory: [ ] hemoptysis. [ ] shortness of breath  Cardiovascular: See HPI above  Hematologic/Lymphatic: [ ] anemia. [ ] painful nodes. [ ] prolonged bleeding.   Genitourinary: [ ] hematuria. [ ] flank pain.   Endocrine: [ ] significant change in weight. [ ] intolerance to heat and cold.     Review of systems [x ] otherwise negative, [ ] otherwise unable to obtain    FH: no family history of sudden cardiac death in first degree relatives    SH: [ ] tobacco, [ ] alcohol, [ ] drugs    acetaminophen     Tablet .. 650 milliGRAM(s) Oral every 6 hours PRN  aluminum hydroxide/magnesium hydroxide/simethicone Suspension 30 milliLiter(s) Oral every 6 hours PRN  enoxaparin Injectable 40 milliGRAM(s) SubCutaneous every 24 hours  lactated ringers. 1000 milliLiter(s) IV Continuous <Continuous>  levothyroxine 25 MICROGram(s) Oral daily  losartan 50 milliGRAM(s) Oral daily  melatonin 3 milliGRAM(s) Oral at bedtime PRN  pantoprazole    Tablet 40 milliGRAM(s) Oral before breakfast  polyethylene glycol 3350 17 Gram(s) Oral two times a day  polyethylene glycol/electrolyte Solution. 4000 milliLiter(s) Oral once  potassium chloride   Powder 20 milliEquivalent(s) Oral once  senna 2 Tablet(s) Oral at bedtime  simethicone 80 milliGRAM(s) Chew three times a day                            11.5   4.91  )-----------( 218      ( 29 Apr 2024 05:14 )             35.7     140  |  106  |  6<L>  ----------------------------<  79  3.7   |  23  |  0.43<L>    Ca    8.4      29 Apr 2024 05:14  Phos  3.4     04-29  Mg     2.10     04-29      T(C): 36.5 (04-29-24 @ 09:03), Max: 36.7 (04-29-24 @ 07:03)  HR: 62 (04-29-24 @ 09:03) (56 - 80)  BP: 117/71 (04-29-24 @ 09:03) (109/64 - 124/76)  RR: 17 (04-29-24 @ 09:03) (16 - 17)  SpO2: 99% (04-29-24 @ 09:03) (98% - 99%)  Wt(kg): --    I&O's Summary    28 Apr 2024 07:01  -  29 Apr 2024 07:00  --------------------------------------------------------  IN: 240 mL / OUT: 0 mL / NET: 240 mL      General:  Alert and Oriented * 3.   Head: Normocephalic and atraumatic.   Neck: No JVD. No bruits. Supple. Does not appear to be enlarged.   Cardiovascular: + S1,S2 ; RRR Soft systolic murmur at the left lower sternal border. No rubs noted.    Lungs: CTA b/l. No rhonchi, rales or wheezes.   Abdomen: + BS, soft. Non tender. Non distended. No rebound. No guarding.   Extremities: No clubbing/cyanosis/edema.   Neurologic: Moves all four extremities. Full range of motion.   Skin: Warm and moist. The patient's skin has normal elasticity and good skin turgor.   Psychiatric: Appropriate mood and affect.  Musculoskeletal: Normal range of motion, normal strength     TELEMETRY: NSR	      ECG:  	Sinus, Sinus Arrhymia    Stress 04/2023  Conclusions: Normal study  Normal maximal exercise treadmill stress test.  Fair exercise performance.  No evidence of stress induced ischemia.       LE Dopplers:  Conclusions: Normal study  Normal maximal exercise treadmill stress test.  Fair exercise performance.  No evidence of stress induced ischemia.       Conclusions:  Complete bilateral study performed.  No other hemodynamically significant stenoses are identified in the lower extremity  arterial system, bilaterally    TTE  06/2022  Findings:  1. Normal left ventricular size and function.  Mild diastolic dysfunction.  2. Normal left atrial size  3. Right atrial cavity is normal in size.  4. Normal right ventricular size and function.  5. Normal trileaflet aortic valve opening.  6. Normal mitral valve opening.  7. Normal appearing tricuspid valve with mild tricuspid  regurgitation.  8. Pulmonic valve is grossly normal, yet poorly visualized  with no doppler evidence for pulmonic stenosis.  9. No evidence of significant pericardial effusion.  10. The aortic root is normal.  11. Normal pulmonary artery.  12. IVC is normal with respiratory variation.  FULL STUDY DONE INCLUDING M-MODE RECORDING,  SPECTRAL DOPPLER AND COLOR FLOW  ECHOCARDIOGRAPHY.      ASSESSMENT/PLAN: 	Patient is a 64 year-old female with history of HTN, hypothyroidism who presents with complaints of weakness/fatigue as well as unintentional weight loss of around 15-16 pounds. Cardiology following for near syncope    Weakness/Near Syncope  - suspect due to orthostasis from decreased intake  - check orthostats  - c/w losartan for now  - maintain tele while in hospital  - EGD/C-Scope planned this week  - prior stress and echo as above, pt with no high risk features, no anginal sx, CHF, arrhythmias or severe valvular disease no further work up planned prior to procedures, optimized from CV perspective    Kristen GAMEZ  [phone]

## 2024-04-30 ENCOUNTER — RESULT REVIEW (OUTPATIENT)
Age: 64
End: 2024-04-30

## 2024-04-30 LAB
ANION GAP SERPL CALC-SCNC: 13 MMOL/L — SIGNIFICANT CHANGE UP (ref 7–14)
APTT BLD: 30.8 SEC — SIGNIFICANT CHANGE UP (ref 24.5–35.6)
BLD GP AB SCN SERPL QL: NEGATIVE — SIGNIFICANT CHANGE UP
BUN SERPL-MCNC: 5 MG/DL — LOW (ref 7–23)
CALCIUM SERPL-MCNC: 8.6 MG/DL — SIGNIFICANT CHANGE UP (ref 8.4–10.5)
CHLORIDE SERPL-SCNC: 105 MMOL/L — SIGNIFICANT CHANGE UP (ref 98–107)
CO2 SERPL-SCNC: 23 MMOL/L — SIGNIFICANT CHANGE UP (ref 22–31)
CREAT SERPL-MCNC: 0.4 MG/DL — LOW (ref 0.5–1.3)
EGFR: 110 ML/MIN/1.73M2 — SIGNIFICANT CHANGE UP
GLUCOSE BLDC GLUCOMTR-MCNC: 188 MG/DL — HIGH (ref 70–99)
GLUCOSE BLDC GLUCOMTR-MCNC: 68 MG/DL — LOW (ref 70–99)
GLUCOSE BLDC GLUCOMTR-MCNC: 69 MG/DL — LOW (ref 70–99)
GLUCOSE SERPL-MCNC: 68 MG/DL — LOW (ref 70–99)
HCT VFR BLD CALC: 34.8 % — SIGNIFICANT CHANGE UP (ref 34.5–45)
HGB BLD-MCNC: 11.5 G/DL — SIGNIFICANT CHANGE UP (ref 11.5–15.5)
INR BLD: 1.27 RATIO — HIGH (ref 0.85–1.18)
MAGNESIUM SERPL-MCNC: 2.1 MG/DL — SIGNIFICANT CHANGE UP (ref 1.6–2.6)
MCHC RBC-ENTMCNC: 31.7 PG — SIGNIFICANT CHANGE UP (ref 27–34)
MCHC RBC-ENTMCNC: 33 GM/DL — SIGNIFICANT CHANGE UP (ref 32–36)
MCV RBC AUTO: 95.9 FL — SIGNIFICANT CHANGE UP (ref 80–100)
NRBC # BLD: 0 /100 WBCS — SIGNIFICANT CHANGE UP (ref 0–0)
NRBC # FLD: 0 K/UL — SIGNIFICANT CHANGE UP (ref 0–0)
PHOSPHATE SERPL-MCNC: 3 MG/DL — SIGNIFICANT CHANGE UP (ref 2.5–4.5)
PLATELET # BLD AUTO: 229 K/UL — SIGNIFICANT CHANGE UP (ref 150–400)
POTASSIUM SERPL-MCNC: 3.7 MMOL/L — SIGNIFICANT CHANGE UP (ref 3.5–5.3)
POTASSIUM SERPL-SCNC: 3.7 MMOL/L — SIGNIFICANT CHANGE UP (ref 3.5–5.3)
PROTHROM AB SERPL-ACNC: 14.3 SEC — HIGH (ref 9.5–13)
RBC # BLD: 3.63 M/UL — LOW (ref 3.8–5.2)
RBC # FLD: 11.8 % — SIGNIFICANT CHANGE UP (ref 10.3–14.5)
RH IG SCN BLD-IMP: POSITIVE — SIGNIFICANT CHANGE UP
SODIUM SERPL-SCNC: 141 MMOL/L — SIGNIFICANT CHANGE UP (ref 135–145)
WBC # BLD: 5.86 K/UL — SIGNIFICANT CHANGE UP (ref 3.8–10.5)
WBC # FLD AUTO: 5.86 K/UL — SIGNIFICANT CHANGE UP (ref 3.8–10.5)

## 2024-04-30 PROCEDURE — 99232 SBSQ HOSP IP/OBS MODERATE 35: CPT

## 2024-04-30 PROCEDURE — 43239 EGD BIOPSY SINGLE/MULTIPLE: CPT | Mod: GC,59

## 2024-04-30 PROCEDURE — 88305 TISSUE EXAM BY PATHOLOGIST: CPT | Mod: 26

## 2024-04-30 PROCEDURE — 45378 DIAGNOSTIC COLONOSCOPY: CPT | Mod: GC

## 2024-04-30 RX ORDER — SODIUM CHLORIDE 9 MG/ML
1000 INJECTION, SOLUTION INTRAVENOUS
Refills: 0 | Status: DISCONTINUED | OUTPATIENT
Start: 2024-04-30 | End: 2024-05-01

## 2024-04-30 RX ORDER — DIPHENHYDRAMINE HCL 50 MG
50 CAPSULE ORAL ONCE
Refills: 0 | Status: COMPLETED | OUTPATIENT
Start: 2024-05-01 | End: 2024-05-01

## 2024-04-30 RX ADMIN — SIMETHICONE 80 MILLIGRAM(S): 80 TABLET, CHEWABLE ORAL at 06:26

## 2024-04-30 RX ADMIN — Medication 50 MILLIGRAM(S): at 23:38

## 2024-04-30 RX ADMIN — Medication 25 MICROGRAM(S): at 05:48

## 2024-04-30 RX ADMIN — PANTOPRAZOLE SODIUM 40 MILLIGRAM(S): 20 TABLET, DELAYED RELEASE ORAL at 06:26

## 2024-04-30 RX ADMIN — SIMETHICONE 80 MILLIGRAM(S): 80 TABLET, CHEWABLE ORAL at 21:45

## 2024-04-30 RX ADMIN — SODIUM CHLORIDE 75 MILLILITER(S): 9 INJECTION, SOLUTION INTRAVENOUS at 08:28

## 2024-04-30 NOTE — PACU DISCHARGE NOTE - AIRWAY PATENCY:
Spoke with  and relayed MJS message below--he states, \"We used the Medi honey before for a foot wound. When we went to the wound clinic, they told us not to use Medi honey because it kills the skin around the wound. \"     then states, \"Ok-I Satisfactory

## 2024-04-30 NOTE — PROGRESS NOTE ADULT - SUBJECTIVE AND OBJECTIVE BOX
Patient is a 64y old  Female who presents with a chief complaint of Weakness, constipation (30 Apr 2024 13:34)      SUBJECTIVE / OVERNIGHT EVENTS: patient seen and examined by bedside at 12:10 Pm , pt alert  and awake no acute distress noted, Pt s/p EGD  and colonoscopy , tolerated procedure well, pt feeling hungry , wants to eat       MEDICATIONS  (STANDING):  dextrose 5% + lactated ringers. 1000 milliLiter(s) (75 mL/Hr) IV Continuous <Continuous>  dextrose 5% + sodium chloride 0.45%. 1000 milliLiter(s) (75 mL/Hr) IV Continuous <Continuous>  levothyroxine 25 MICROGram(s) Oral daily  losartan 50 milliGRAM(s) Oral daily  pantoprazole    Tablet 40 milliGRAM(s) Oral before breakfast  polyethylene glycol 3350 17 Gram(s) Oral two times a day  predniSONE   Tablet 50 milliGRAM(s) Oral once  senna 2 Tablet(s) Oral at bedtime  simethicone 80 milliGRAM(s) Chew three times a day    MEDICATIONS  (PRN):  acetaminophen     Tablet .. 650 milliGRAM(s) Oral every 6 hours PRN Temp greater or equal to 38C (100.4F), Mild Pain (1 - 3)  aluminum hydroxide/magnesium hydroxide/simethicone Suspension 30 milliLiter(s) Oral every 6 hours PRN Dyspepsia  melatonin 3 milliGRAM(s) Oral at bedtime PRN Insomnia      Vital Signs Last 24 Hrs  T(C): 36.2 (30 Apr 2024 09:52), Max: 36.7 (29 Apr 2024 17:30)  T(F): 97.2 (30 Apr 2024 09:52), Max: 98.1 (30 Apr 2024 07:15)  HR: 90 (30 Apr 2024 10:22) (61 - 93)  BP: 114/53 (30 Apr 2024 10:22) (99/53 - 137/69)  BP(mean): --  RR: 11 (30 Apr 2024 10:22) (11 - 18)  SpO2: 100% (30 Apr 2024 10:22) (98% - 100%)    Parameters below as of 30 Apr 2024 10:22  Patient On (Oxygen Delivery Method): room air      CAPILLARY BLOOD GLUCOSE      POCT Blood Glucose.: 188 mg/dL (30 Apr 2024 10:09)  POCT Blood Glucose.: 68 mg/dL (30 Apr 2024 08:12)  POCT Blood Glucose.: 69 mg/dL (30 Apr 2024 08:10)    I&O's Summary      PHYSICAL EXAM:  GENERAL: NAD, well-developed  HEAD:  Atraumatic, Normocephalic  EYES: EOMI, PERRLA, conjunctiva and sclera clear  CHEST/LUNG: Clear to auscultation bilaterally; No wheeze  HEART: Regular rate and rhythm; No murmurs, rubs, or gallops  ABDOMEN: Soft, Nontender, Nondistended; Bowel sounds present  EXTREMITIES:  2+ Peripheral Pulses, No clubbing, cyanosis, or edema  PSYCH: AAOx3  NEUROLOGY: non-focal  SKIN: No rashes or lesions    LABS:                        11.5   5.86  )-----------( 229      ( 30 Apr 2024 02:53 )             34.8     04-30    141  |  105  |  5<L>  ----------------------------<  68<L>  3.7   |  23  |  0.40<L>    Ca    8.6      30 Apr 2024 02:53  Phos  3.0     04-30  Mg     2.10     04-30      PT/INR - ( 30 Apr 2024 02:53 )   PT: 14.3 sec;   INR: 1.27 ratio         PTT - ( 30 Apr 2024 02:53 )  PTT:30.8 sec      Urinalysis Basic - ( 30 Apr 2024 02:53 )    Color: x / Appearance: x / SG: x / pH: x  Gluc: 68 mg/dL / Ketone: x  / Bili: x / Urobili: x   Blood: x / Protein: x / Nitrite: x   Leuk Esterase: x / RBC: x / WBC x   Sq Epi: x / Non Sq Epi: x / Bacteria: x        RADIOLOGY & ADDITIONAL TESTS:  < from: Colonoscopy (04.30.24 @ 08:11) >                                                                        Impression:          - Non-bleeding internal hemorrhoids.                       - Stool in the entire examined colon.                       - No specimens collected.  Recommendation:      - Consider repeat colonoscopy in 3 - 5 years for                        screening purposes given prep quality.                       - Proceed with EGD as previously planned.    < end of copied text >  < from: Upper Endoscopy (04.30.24 @ 08:10) >  Findings:       A small hiatal hernia was present.       The exam of the esophagus was otherwise normal.       Patchy minimal inflammation characterized by erythema was found in the        stomach. Biopsies were taken with a cold forceps for Helicobacter pylori        testing.       The duodenal bulb and second portion of the duodenum were normal.                                                                                   Impression:          - Small hiatal hernia.                - Gastritis. Biopsied.                       - Normal duodenal bulb and second portion of the                        duodenum.  Recommendation:      - Await pathology results.                       - Advance diet as tolerated.              - Suggest cross-sectional imaging (chest/abd/pelvis) to                        further evaluation significant unintentional weight loss    < end of copied text >    Imaging Personally Reviewed:    Consultant(s) Notes Reviewed:  cardiology , GI     Care Discussed with Consultants/Other Providers:

## 2024-04-30 NOTE — PROGRESS NOTE ADULT - PROBLEM SELECTOR PLAN 6
- F: none  - E: replete K<4, Mg<2  - N: CLD  - D: lovenox 40mg q24h - held for c-scope  - G: protonix 40mg daily    code: full  dispo: pending medical optimization; PT recs home no needs  Plan of care d/w pt and ACP

## 2024-04-30 NOTE — PROGRESS NOTE ADULT - SUBJECTIVE AND OBJECTIVE BOX
DATE OF SERVICE: 04-30-24    Patient denies chest pain or shortness of breath.   Review of symptoms otherwise negative.    MEDICATIONS:  acetaminophen     Tablet .. 650 milliGRAM(s) Oral every 6 hours PRN  aluminum hydroxide/magnesium hydroxide/simethicone Suspension 30 milliLiter(s) Oral every 6 hours PRN  dextrose 5% + lactated ringers. 1000 milliLiter(s) IV Continuous <Continuous>  dextrose 5% + sodium chloride 0.45%. 1000 milliLiter(s) IV Continuous <Continuous>  levothyroxine 25 MICROGram(s) Oral daily  losartan 50 milliGRAM(s) Oral daily  melatonin 3 milliGRAM(s) Oral at bedtime PRN  pantoprazole    Tablet 40 milliGRAM(s) Oral before breakfast  polyethylene glycol 3350 17 Gram(s) Oral two times a day  senna 2 Tablet(s) Oral at bedtime  simethicone 80 milliGRAM(s) Chew three times a day      LABS:                        11.5   5.86  )-----------( 229      ( 30 Apr 2024 02:53 )             34.8       Hemoglobin: 11.5 g/dL (04-30 @ 02:53)  Hemoglobin: 11.5 g/dL (04-29 @ 05:14)  Hemoglobin: 12.7 g/dL (04-27 @ 05:59)  Hemoglobin: 12.2 g/dL (04-26 @ 06:44)  Hemoglobin: 12.5 g/dL (04-25 @ 20:24)      04-30    141  |  105  |  5<L>  ----------------------------<  68<L>  3.7   |  23  |  0.40<L>    Ca    8.6      30 Apr 2024 02:53  Phos  3.0     04-30  Mg     2.10     04-30      Creatinine Trend: 0.40<--, 0.43<--, 0.46<--, 0.44<--, 0.55<--, 0.45<--    COAGS: PT/INR - ( 30 Apr 2024 02:53 )   PT: 14.3 sec;   INR: 1.27 ratio         PTT - ( 30 Apr 2024 02:53 )  PTT:30.8 sec          PHYSICAL EXAM:  T(C): 36.2 (04-30-24 @ 09:52), Max: 36.7 (04-29-24 @ 17:30)  HR: 90 (04-30-24 @ 10:22) (61 - 93)  BP: 114/53 (04-30-24 @ 10:22) (99/53 - 137/69)  RR: 11 (04-30-24 @ 10:22) (11 - 18)  SpO2: 100% (04-30-24 @ 10:22) (98% - 100%)  Wt(kg): --    I&O's Summary    General:  Alert and Oriented * 3.   Head: Normocephalic and atraumatic.   Neck: No JVD. No bruits. Supple. Does not appear to be enlarged.   Cardiovascular: + S1,S2 ; RRR Soft systolic murmur at the left lower sternal border. No rubs noted.    Lungs: CTA b/l. No rhonchi, rales or wheezes.   Abdomen: + BS, soft. Non tender. Non distended. No rebound. No guarding.   Extremities: No clubbing/cyanosis/edema.   Neurologic: Moves all four extremities. Full range of motion.   Skin: Warm and moist. The patient's skin has normal elasticity and good skin turgor.   Psychiatric: Appropriate mood and affect.  Musculoskeletal: Normal range of motion, normal strength     TELEMETRY: NSR	      ECG:  	Sinus, Sinus Arrhymia    Stress 04/2023  Conclusions: Normal study  Normal maximal exercise treadmill stress test.  Fair exercise performance.  No evidence of stress induced ischemia.       LE Dopplers:  Conclusions: Normal study  Normal maximal exercise treadmill stress test.  Fair exercise performance.  No evidence of stress induced ischemia.       Conclusions:  Complete bilateral study performed.  No other hemodynamically significant stenoses are identified in the lower extremity  arterial system, bilaterally    TTE  06/2022  Findings:  1. Normal left ventricular size and function.  Mild diastolic dysfunction.  2. Normal left atrial size  3. Right atrial cavity is normal in size.  4. Normal right ventricular size and function.  5. Normal trileaflet aortic valve opening.  6. Normal mitral valve opening.  7. Normal appearing tricuspid valve with mild tricuspid  regurgitation.  8. Pulmonic valve is grossly normal, yet poorly visualized  with no doppler evidence for pulmonic stenosis.  9. No evidence of significant pericardial effusion.  10. The aortic root is normal.  11. Normal pulmonary artery.  12. IVC is normal with respiratory variation.  FULL STUDY DONE INCLUDING M-MODE RECORDING,  SPECTRAL DOPPLER AND COLOR FLOW  ECHOCARDIOGRAPHY.      < from: Colonoscopy (04.30.24 @ 08:11) >  indings:       The perianal and digital rectal examinations were normal.       Non-bleeding internal hemorrhoids were found during retroflexion and        during endoscopy. The hemorrhoids were small.       A moderate amount of stool was found in the entire colon, interfering        with visualization. The majority was cleared with irrigation/suction,        but there were a few patches of solid debris that could not be cleared        and small underlying lesions may have been missed.       The exam was otherwise normal throughout the examined colon.                                                                        Impression:          - Non-bleeding internal hemorrhoids.                       - Stool in the entire examined colon.                       - No specimens collected.  Recommendation:      - Consider repeat colonoscopy in 3 - 5 years for                        screening purposes given prep quality.                       - Proceed with EGD as previously planned.      < from: Upper Endoscopy (04.30.24 @ 08:10) >  Findings:       A small hiatal hernia was present.       The exam of the esophagus was otherwise normal.       Patchy minimal inflammation characterized by erythema was found in the        stomach. Biopsies were taken with a cold forceps for Helicobacter pylori        testing.       The duodenal bulb and second portion of the duodenum were normal.                                                                                   Impression:          - Small hiatal hernia.                - Gastritis. Biopsied.                       - Normal duodenal bulb and second portion of the                        duodenum.  Recommendation:      - Await pathology results.                       - Advance diet as tolerated.              - Suggest cross-sectional imaging (chest/abd/pelvis) to                        further evaluation significant unintentional weight loss.          ASSESSMENT/PLAN: 	Patient is a 64 year-old female with history of HTN, hypothyroidism who presents with complaints of weakness/fatigue as well as unintentional weight loss of around 15-16 pounds. Cardiology following for near syncope    Weakness/Near Syncope  - suspect due to orthostasis from decreased intake  - check orthostats  - c/w losartan for now  - maintain tele while in hospital  - EGD/C-Scope today as above  - prior stress and echo as above, pt with no high risk features, no anginal sx, CHF, arrhythmias or severe valvular disease no further work up planned prior to procedures, optimized from CV perspective    Lina Pearson MD  Pager: [phone]

## 2024-04-30 NOTE — CHART NOTE - NSCHARTNOTEFT_GEN_A_CORE
GI recommending CT imaging for patient with abdominal pain and unexplained weight loss. Patient with known IV contrast allergy; per patient, reaction to contrast is a rash. Per discussion with medicine attending, kelly Craig to proceed with CT with pre-medication. CT C/A/P w/ contrast ordered. Pre-medication per protocol ordered - prednisone 50mg at 13 hours, 7 hours, and 1 hour prior to IV contrast administration and benadryl 50mg at 1 hour prior to IV contrast administration. Provider discussed with Genet Andrea at [phone]; per discussion, patient scheduled for CT at 12pm 5/1.
[facility] Department of Hospital Medicine  Belle Dodd DO  Available on MS Teams  Pager: [phone]    Patient is a 64y old  Female who presents with a chief complaint of Weakness, constipation (26 Apr 2024 11:55)    Pt seen and examined at bedside, sitting in bedside chair. Perplexed and anxious about constellation of symptoms, mostly with her post- PO bloating and lack of significant BMs / change to stool caliber. Denies personal or FHx cancer. Waiting to see GI. Reviewed outpt records kindly provided by Dr. Pearson and in physical chart -- recent CT a/p benign. Discussed normal abd US with pt -- again expressed frustration with lack of findings despite her ongoing symptoms. No other concerns or complaints.     VITAL SIGNS:  T(C): 36.6 (04-26-24 @ 05:20), Max: 36.8 (04-25-24 @ 17:34)  T(F): 97.9 (04-26-24 @ 05:20), Max: 98.2 (04-25-24 @ 17:34)  HR: 63 (04-26-24 @ 05:20) (63 - 109)  BP: 127/75 (04-26-24 @ 05:20) (122/69 - 172/76)  BP(mean): --  RR: 17 (04-26-24 @ 05:20) (12 - 17)  SpO2: 100% (04-26-24 @ 05:20) (95% - 100%)  Wt(kg): --    PHYSICAL EXAM:  Constitutional: resting comfortably in bed; NAD  Head: NC/AT  Eyes: PERRL, EOMI, anicteric sclera  ENT: no nasal discharge; MMM  Neck: supple; no JVD  Respiratory: CTA B/L; no W/R/R  Cardiac: +S1/S2; RRR; no M/R/G  Gastrointestinal: soft, NT/ND; no rebound or guarding; +BSx4  Extremities: WWP, no clubbing or cyanosis; no peripheral edema  Musculoskeletal: NROM x4; no joint swelling, tenderness or erythema  Vascular: 2+ radial, DP/PT pulses B/L  Dermatologic: skin warm, dry and intact; no rashes, wounds, or scars  Neurologic: AAOx3; CNII-XII grossly intact; no focal deficits  Psychiatric: affect and characteristics of appearance, verbalizations, behaviors are appropriate    Please see H&P from earlier this morning for additional details.

## 2024-05-01 ENCOUNTER — TRANSCRIPTION ENCOUNTER (OUTPATIENT)
Age: 64
End: 2024-05-01

## 2024-05-01 VITALS
TEMPERATURE: 98 F | SYSTOLIC BLOOD PRESSURE: 106 MMHG | RESPIRATION RATE: 18 BRPM | DIASTOLIC BLOOD PRESSURE: 62 MMHG | OXYGEN SATURATION: 100 % | HEART RATE: 73 BPM

## 2024-05-01 LAB
ANION GAP SERPL CALC-SCNC: 9 MMOL/L — SIGNIFICANT CHANGE UP (ref 7–14)
BUN SERPL-MCNC: 12 MG/DL — SIGNIFICANT CHANGE UP (ref 7–23)
CALCIUM SERPL-MCNC: 8.6 MG/DL — SIGNIFICANT CHANGE UP (ref 8.4–10.5)
CHLORIDE SERPL-SCNC: 105 MMOL/L — SIGNIFICANT CHANGE UP (ref 98–107)
CO2 SERPL-SCNC: 24 MMOL/L — SIGNIFICANT CHANGE UP (ref 22–31)
CREAT SERPL-MCNC: 0.37 MG/DL — LOW (ref 0.5–1.3)
EGFR: 113 ML/MIN/1.73M2 — SIGNIFICANT CHANGE UP
GLUCOSE SERPL-MCNC: 138 MG/DL — HIGH (ref 70–99)
HCT VFR BLD CALC: 36.3 % — SIGNIFICANT CHANGE UP (ref 34.5–45)
HGB BLD-MCNC: 11.7 G/DL — SIGNIFICANT CHANGE UP (ref 11.5–15.5)
MAGNESIUM SERPL-MCNC: 1.9 MG/DL — SIGNIFICANT CHANGE UP (ref 1.6–2.6)
MCHC RBC-ENTMCNC: 31 PG — SIGNIFICANT CHANGE UP (ref 27–34)
MCHC RBC-ENTMCNC: 32.2 GM/DL — SIGNIFICANT CHANGE UP (ref 32–36)
MCV RBC AUTO: 96 FL — SIGNIFICANT CHANGE UP (ref 80–100)
NRBC # BLD: 0 /100 WBCS — SIGNIFICANT CHANGE UP (ref 0–0)
NRBC # FLD: 0 K/UL — SIGNIFICANT CHANGE UP (ref 0–0)
PHOSPHATE SERPL-MCNC: 2.9 MG/DL — SIGNIFICANT CHANGE UP (ref 2.5–4.5)
PLATELET # BLD AUTO: 236 K/UL — SIGNIFICANT CHANGE UP (ref 150–400)
POTASSIUM SERPL-MCNC: 4.3 MMOL/L — SIGNIFICANT CHANGE UP (ref 3.5–5.3)
POTASSIUM SERPL-SCNC: 4.3 MMOL/L — SIGNIFICANT CHANGE UP (ref 3.5–5.3)
RBC # BLD: 3.78 M/UL — LOW (ref 3.8–5.2)
RBC # FLD: 11.8 % — SIGNIFICANT CHANGE UP (ref 10.3–14.5)
SODIUM SERPL-SCNC: 138 MMOL/L — SIGNIFICANT CHANGE UP (ref 135–145)
WBC # BLD: 7.03 K/UL — SIGNIFICANT CHANGE UP (ref 3.8–10.5)
WBC # FLD AUTO: 7.03 K/UL — SIGNIFICANT CHANGE UP (ref 3.8–10.5)

## 2024-05-01 PROCEDURE — 99232 SBSQ HOSP IP/OBS MODERATE 35: CPT | Mod: GC

## 2024-05-01 PROCEDURE — 99239 HOSP IP/OBS DSCHRG MGMT >30: CPT

## 2024-05-01 PROCEDURE — 71260 CT THORAX DX C+: CPT | Mod: 26

## 2024-05-01 PROCEDURE — 74177 CT ABD & PELVIS W/CONTRAST: CPT | Mod: 26

## 2024-05-01 RX ORDER — SIMETHICONE 80 MG/1
1 TABLET, CHEWABLE ORAL
Qty: 21 | Refills: 0
Start: 2024-05-01 | End: 2024-05-07

## 2024-05-01 RX ORDER — SENNA PLUS 8.6 MG/1
2 TABLET ORAL
Qty: 0 | Refills: 0 | DISCHARGE
Start: 2024-05-01

## 2024-05-01 RX ORDER — PANTOPRAZOLE SODIUM 20 MG/1
1 TABLET, DELAYED RELEASE ORAL
Qty: 30 | Refills: 0
Start: 2024-05-01 | End: 2024-05-30

## 2024-05-01 RX ORDER — POLYETHYLENE GLYCOL 3350 17 G/17G
17 POWDER, FOR SOLUTION ORAL
Qty: 0 | Refills: 0 | DISCHARGE
Start: 2024-05-01

## 2024-05-01 RX ADMIN — Medication 25 MICROGRAM(S): at 06:10

## 2024-05-01 RX ADMIN — Medication 50 MILLIGRAM(S): at 06:21

## 2024-05-01 RX ADMIN — SIMETHICONE 80 MILLIGRAM(S): 80 TABLET, CHEWABLE ORAL at 13:36

## 2024-05-01 RX ADMIN — PANTOPRAZOLE SODIUM 40 MILLIGRAM(S): 20 TABLET, DELAYED RELEASE ORAL at 06:10

## 2024-05-01 RX ADMIN — Medication 50 MILLIGRAM(S): at 11:24

## 2024-05-01 RX ADMIN — Medication 50 MILLIGRAM(S): at 11:25

## 2024-05-01 NOTE — DISCHARGE NOTE PROVIDER - HOSPITAL COURSE
64F PMH HTN and hypothyroidism p/w BL LE weakness, SOB, fatigue, unintentional weight loss, decreased stool caliber x1mo with 2x unsuccessful c-scopes as an o/p admitted for EGD and c-scope, planned 4/30.       Nutritional Assessment:  · Nutritional Assessment	This patient has been assessed with a concern for Malnutrition and has been determined to have a diagnosis/diagnoses of Moderate protein-calorie malnutrition.    This patient is being managed with:   Diet Regular-  DASH/TLC {Sodium & Cholesterol Restricted} (DASH)  Entered: Apr 30 2024 12:23PM     Abdominal pain.   ·- pt p/w abd pain, constipation, decreased stool caliber, 15lb weight loss x1mo. Has been evaluated by PCP with CT a/p benign but unsuccessful c-scope x2 as o/p due to poor prep.   - hemodynamically stable on arrival; EKG ST without ischemic changes  - lipase neg, trop neg, BNP neg, TSH WNL  - US abd with hepatic steatosis  - hx cholecystectomy in the past  - abd exam largely benign  - VA duplex aorta, IVC, iliac poor quality due to habitus  - GI recs appreciated  -  simethicone 80mg TID x5d  - PRN tylenol for pain  - avoid opioids   - protonix 40mg daily   pt s/p EGD/colonoscopy, no significant finding that can be attributed to the pain, GI rec  cross-sectional imaging (chest/abd/pelvis) to further evaluation significant unintentional weight loss, , Notified by ACP , pt has hx of allergy to IV contrast, got a rash, will premedicate  CT  C/A/P noted, unremarkable ,   will f/u with GI.     Hypertension.   ·  Plan: - c/w home losartan 50mg daily.     Hypothyroidism.   ·  Plan: - TSH WNL  - c/w home synthroid 25mcg daily.  Patient hemodynamically stable for discharge home  Time spent in discharge process is 32 min

## 2024-05-01 NOTE — DISCHARGE NOTE PROVIDER - NSDCMRMEDTOKEN_GEN_ALL_CORE_FT
losartan 50 mg oral tablet: 1 tab(s) orally once a day  Synthroid 25 mcg (0.025 mg) oral tablet: 1 tab(s) orally once a day   losartan 50 mg oral tablet: 1 tab(s) orally once a day  pantoprazole 40 mg oral delayed release tablet: 1 tab(s) orally once a day (before a meal)  polyethylene glycol 3350 oral powder for reconstitution: 17 gram(s) orally 2 times a day  senna leaf extract oral tablet: 2 tab(s) orally once a day (at bedtime)  simethicone 80 mg oral tablet, chewable: 1 tab(s) orally 3 times a day as needed for  indigestion  Synthroid 25 mcg (0.025 mg) oral tablet: 1 tab(s) orally once a day

## 2024-05-01 NOTE — DISCHARGE NOTE PROVIDER - NSDCCPTREATMENT_GEN_ALL_CORE_FT
PRINCIPAL PROCEDURE  Procedure: Endoscopy, UGI  Findings and Treatment:   < end of copied text >    A small hiatal hernia was present.       The exam of the esophagus was otherwise normal.       Patchy minimal inflammation characterized by erythema was found in the        stomach. Biopsies were taken with a cold forceps for Helicobacter pylori        testing.       The duodenal bulb and second portion of the duodenum were normal.                                                                                   Impression:          - Small hiatal hernia.                - Gastritis. Biopsied.                       - Normal duodenal bulb and second portion of the                        duodenum.  Recommendation:      - Await pathology results.                       - Advance diet as tolerated.              - Suggest cross-sectional imaging (chest/abd/pelvis) to                        further evaluation significant unintentional weight loss.< from: Upper Endoscopy (04.30.24 @ 08:10) >        SECONDARY PROCEDURE  Procedure: Colonoscopy  Findings and Treatment:   < end of copied text >  indings:       The perianal and digital rectal examinations were normal.       Non-bleeding internal hemorrhoids were found during retroflexion and        during endoscopy. The hemorrhoids were small.       A moderate amount of stool was found in the entire colon, interfering        with visualization. The majority was cleared with irrigation/suction,        but there were a few patches of solid debris that could not be cleared        and small underlying lesions may have been missed.       The exam was otherwise normal throughout the examined colon.                                                                        Impression:          - Non-bleeding internal hemorrhoids.                       - Stool in the entire examined colon.                       - No specimens collected.  Recommendation:      - Consider repeat colonoscopy in 3 - 5 years for                        screening purposes given prep quality.                       - Proceed with EGD as previously planned.               Attending Participation:< from: Colonoscopy (04.30.24 @ 08:11) >      Procedure: CT scan  Findings and Treatment:   < end of copied text >  FINDINGS:  CHEST:  LUNGS AND LARGE AIRWAYS: Patent central airways. No pulmonary nodules.  PLEURA: No pleural effusion.  VESSELS: Within normal limits.  HEART: Heart size is normal. No pericardial effusion.  MEDIASTINUM AND VELIA: No lymphadenopathy.  CHEST WALL AND LOWER NECK: Within normal limits.  ABDOMEN AND PELVIS:  LIVER: Within normal limits.  BILE DUCTS: Postcholecystectomy biliary ductal dilatation.  GALLBLADDER: Within normal limits.  SPLEEN: Within normal limits.  PANCREAS: Within normal limits.  ADRENALS: Within normal limits.  KIDNEYS/URETERS: Within normal limits.  BLADDER: Within normal limits.  REPRODUCTIVE ORGANS: Myomatous uterus. Bilateral adnexa within normal   limits.  BOWEL: No bowel obstruction. Appendix is normal.  PERITONEUM: No ascites.  VESSELS: Within normal limits.  RETROPERITONEUM/LYMPH NODES: No lymphadenopathy.  ABDOMINAL WALL: Within normal limits.  BONES: Degenerative changes.  IMPRESSION:  No evidence of suspicious mass or adenopathy in the chest, abdomen, or   pelvis.< from: CT Abdomen and Pelvis w/ IV Cont (05.01.24 @ 12:05) >

## 2024-05-01 NOTE — PROGRESS NOTE ADULT - PROBLEM SELECTOR PLAN 1
- pt p/w abd pain, constipation, decreased stool caliber, 15lb weight loss x1mo. Has been evaluated by PCP with CT a/p benign but unsuccessful c-scope x2 as o/p due to poor prep.   - hemodynamically stable on arrival; EKG ST without ischemic changes  - lipase neg, trop neg, BNP neg, TSH WNL  - US abd with hepatic steatosis  - hx cholecystectomy in the past  - abd exam largely benign  - VA duplex aorta, IVC, iliac poor quality due to habitus  Plan:  - GI recs appreciated  - c/w simethicone 80mg TID x5d  - PRN tylenol for pain  - avoid opioids   - protonix 40mg daily   - plan for c-scope 4/30. additional 4L golytely to be given at 6pm 4/29. if BM not clear tomorrow morning, will order additional 2L to be finished by 7am.  - will discuss with GI if need CTA to better characterise mesenteric vasculature
- pt p/w abd pain, constipation, decreased stool caliber, 15lb weight loss x1mo. Has been evaluated by PCP with CT a/p benign but unsuccessful c-scope x2 as o/p due to poor prep.   - hemodynamically stable on arrival; EKG ST without ischemic changes  - lipase neg, trop neg, BNP neg, TSH WNL  - US abd with hepatic steatosis  - hx cholecystectomy in the past  - abd exam largely benign  - VA duplex aorta, IVC, iliac poor quality due to habitus  Plan:  - GI recs appreciated  - c/w simethicone 80mg TID x5d  - PRN tylenol for pain  - avoid opioids   - protonix 40mg daily   pt s/p EGD/colonoscopy, no significant finding that can be attributed to the pain, GI rec  cross-sectional imaging (chest/abd/pelvis) to further evaluation significant unintentional weight loss, , Notified by ACP , pt has hx of allergy to IV contrast, got a rash, will premedicate  CT  C/A/P noted, unremarkable ,   will f/u with GI
- pt p/w abd pain x1mo, evaluated by PCP with CT a/p benign and 2x unsuccessful outpt attempts for scope d/t poor prep   - hemodynamically stable on arrival; EKG ST without ischemic changes  - largely largely unremarkable  - lipase neg, trop neg, BNP neg, TSH WNL  - US abd with hepatic steatosis  - hx cholecystectomy in the past  - abd exam largely benign  - start simethicone 80mg TID x5d empirically  - PRN tylenol for pain  - avoid opioids   - protonix 40mg daily   - GI consulted, appreciate recs
- pt p/w abd pain, constipation, decreased stool caliber, 15lb weight loss x1mo. Has been evaluated by PCP with CT a/p benign but unsuccessful c-scope x2 as o/p due to poor prep.   - hemodynamically stable on arrival; EKG ST without ischemic changes  - lipase neg, trop neg, BNP neg, TSH WNL  - US abd with hepatic steatosis  - hx cholecystectomy in the past  - abd exam largely benign  - VA duplex aorta, IVC, iliac poor quality due to habitus  Plan:  - GI recs appreciated  - c/w simethicone 80mg TID x5d  - PRN tylenol for pain  - avoid opioids   - protonix 40mg daily   pt s/p EGD/colonoscopy, no significant finding that can be attributed to the pain, GI rec  cross-sectional imaging (chest/abd/pelvis) to further evaluation significant unintentional weight loss, will check CT C/A/P with IV con, Notified by ACP , pt has hx of allergy to IV contrast, got a rash, will premedicate
- pt p/w abd pain x1mo, evaluated by PCP with CT a/p benign and 2x unsuccessful outpt attempts for scope d/t poor prep   - hemodynamically stable on arrival; EKG ST without ischemic changes  - largely largely unremarkable  - lipase neg, trop neg, BNP neg, TSH WNL  - US abd with hepatic steatosis  - hx cholecystectomy in the past  - abd exam largely benign  - c/w simethicone 80mg TID x5d empirically  - PRN tylenol for pain  - avoid opioids   - protonix 40mg daily   - GI consulted, appreciate recs

## 2024-05-01 NOTE — DISCHARGE NOTE PROVIDER - PROVIDER TOKENS
PROVIDER:[TOKEN:[[id number]]] PROVIDER:[TOKEN:[[id number]]],PROVIDER:[TOKEN:[[id number]]]

## 2024-05-01 NOTE — PROGRESS NOTE ADULT - ATTENDING COMMENTS
Tolerated first day of bowel preparation and reports that bowels are starting to appear liquid yellow.     # Weight loss  # Lower abdominal pain  # Pencil-like stools  # History of poor prep for colonoscopies    --Plan for second day of prep today  --Tentatively plan for colonoscopy +/- EGD tomorrow if adequate prepped and otherwise medically optimized to evaluate for etiology of weight loss and change in bowel habits
S/p EGD and colonoscopy yesterday with results outlined above, but no obvious endoscopic findings to explain patient's abdominal pain, weight loss, or change in bowel habits. CT obtained without evidence of underlying malignancy. Follow up pending path from EGD and repeat colonoscopy as outpatient in 3-5 years. Suggest outpatient GI follow up and would consider NM GES to further evaluation patient's complaints of poor PO tolerance and ?"delayed digestion." OK to trial PPI for dyspeptic symptoms. Additional recommendations as above.

## 2024-05-01 NOTE — PROGRESS NOTE ADULT - SUBJECTIVE AND OBJECTIVE BOX
Patient is a 64y old  Female who presents with a chief complaint of Weakness, constipation (01 May 2024 11:59)      SUBJECTIVE / OVERNIGHT EVENTS: patient seen and examined by bedside, pt tolerated regular diet , still with abdominal discomfort, pt passing very little gas  denies headache, dizziness, SOB, CP, Palpitations , N/V/D,   Tele: NSR, no acute events   MEDICATIONS  (STANDING):  levothyroxine 25 MICROGram(s) Oral daily  losartan 50 milliGRAM(s) Oral daily  pantoprazole    Tablet 40 milliGRAM(s) Oral before breakfast  polyethylene glycol 3350 17 Gram(s) Oral two times a day  senna 2 Tablet(s) Oral at bedtime  simethicone 80 milliGRAM(s) Chew three times a day    MEDICATIONS  (PRN):  acetaminophen     Tablet .. 650 milliGRAM(s) Oral every 6 hours PRN Temp greater or equal to 38C (100.4F), Mild Pain (1 - 3)  aluminum hydroxide/magnesium hydroxide/simethicone Suspension 30 milliLiter(s) Oral every 6 hours PRN Dyspepsia  melatonin 3 milliGRAM(s) Oral at bedtime PRN Insomnia      Vital Signs Last 24 Hrs  T(C): 36.4 (01 May 2024 09:35), Max: 37.1 (30 Apr 2024 17:45)  T(F): 97.5 (01 May 2024 09:35), Max: 98.8 (30 Apr 2024 17:45)  HR: 73 (01 May 2024 09:35) (64 - 73)  BP: 106/62 (01 May 2024 09:35) (99/65 - 115/71)  BP(mean): --  RR: 18 (01 May 2024 09:35) (18 - 18)  SpO2: 100% (01 May 2024 09:35) (97% - 100%)    Parameters below as of 01 May 2024 09:35  Patient On (Oxygen Delivery Method): room air      CAPILLARY BLOOD GLUCOSE        I&O's Summary      PHYSICAL EXAM:  GENERAL: NAD, well-developed  HEAD:  Atraumatic, Normocephalic  EYES: EOMI, PERRLA, conjunctiva and sclera clear  CHEST/LUNG: Clear to auscultation bilaterally; No wheeze  HEART: Regular rate and rhythm; No murmurs, rubs, or gallops  ABDOMEN: Soft, Nontender, Nondistended; Bowel sounds present  EXTREMITIES:  2+ Peripheral Pulses, No clubbing, cyanosis, or edema  PSYCH: AAOx3  NEUROLOGY: non-focal  SKIN: No rashes or lesions    LABS:                        11.7   7.03  )-----------( 236      ( 01 May 2024 05:15 )             36.3     05-01    138  |  105  |  12  ----------------------------<  138<H>  4.3   |  24  |  0.37<L>    Ca    8.6      01 May 2024 05:15  Phos  2.9     05-01  Mg     1.90     05-01      PT/INR - ( 30 Apr 2024 02:53 )   PT: 14.3 sec;   INR: 1.27 ratio         PTT - ( 30 Apr 2024 02:53 )  PTT:30.8 sec      Urinalysis Basic - ( 01 May 2024 05:15 )    Color: x / Appearance: x / SG: x / pH: x  Gluc: 138 mg/dL / Ketone: x  / Bili: x / Urobili: x   Blood: x / Protein: x / Nitrite: x   Leuk Esterase: x / RBC: x / WBC x   Sq Epi: x / Non Sq Epi: x / Bacteria: x        RADIOLOGY & ADDITIONAL TESTS:  < from: CT Abdomen and Pelvis w/ IV Cont (05.01.24 @ 12:05) >    FINDINGS:  CHEST:  LUNGS AND LARGE AIRWAYS: Patent central airways. No pulmonary nodules.  PLEURA: No pleural effusion.  VESSELS: Within normal limits.  HEART: Heart size is normal. No pericardial effusion.  MEDIASTINUM AND VELIA: No lymphadenopathy.  CHEST WALL AND LOWER NECK: Within normal limits.    ABDOMEN AND PELVIS:  LIVER: Within normal limits.  BILE DUCTS: Postcholecystectomy biliary ductal dilatation.  GALLBLADDER: Within normal limits.  SPLEEN: Within normal limits.  PANCREAS: Within normal limits.  ADRENALS: Within normal limits.  KIDNEYS/URETERS: Within normal limits.    BLADDER: Within normal limits.  REPRODUCTIVE ORGANS: Myomatous uterus. Bilateral adnexa within normal   limits.    BOWEL: No bowel obstruction. Appendix is normal.  PERITONEUM: No ascites.  VESSELS: Within normal limits.  RETROPERITONEUM/LYMPH NODES: No lymphadenopathy.  ABDOMINAL WALL: Within normal limits.  BONES: Degenerative changes.    IMPRESSION:  No evidence of suspicious mass or adenopathy in the chest, abdomen, or   pelvis.    < end of copied text >    Imaging Personally Reviewed:    Consultant(s) Notes Reviewed:  GI     Care Discussed with Consultants/Other Providers:

## 2024-05-01 NOTE — DISCHARGE NOTE PROVIDER - NSDCFUADDAPPT_GEN_ALL_CORE_FT
Follow up with your primary care provider in 1-2 weeks of discharge.    Follow up with gastroenterology on discharge for pathology results from EGD performed on 4/30. It is recommended that you have a repeat colonoscopy in 3-5 years. You may also benefit from a nuclear medicine Gastric Emptying Study outpatient. Follow up with your primary care provider in 1-2 weeks of discharge.    Follow up with gastroenterology on discharge for pathology results from EGD performed on 4/30. It is recommended that you have a repeat colonoscopy in 3-5 years. You may also benefit from a nuclear medicine Gastric Emptying Study outpatient.    Follow up with cardiology, Dr. Polanco, on 5/16 at 12:45pm as scheduled - [phone].

## 2024-05-01 NOTE — PROGRESS NOTE ADULT - PROBLEM SELECTOR PLAN 5
- TSH WNL  - c/w home synthroid 25mcg daily

## 2024-05-01 NOTE — DISCHARGE NOTE NURSING/CASE MANAGEMENT/SOCIAL WORK - PATIENT PORTAL LINK FT
You can access the FollowMyHealth Patient Portal offered by [facility] by registering at the following website: [email or web address]. By joining [facility]’s FollowMyHealth portal, you will also be able to view your health information using other applications (apps) compatible with our system.

## 2024-05-01 NOTE — PROGRESS NOTE ADULT - NS ATTEND AMEND GEN_ALL_CORE FT
Patient seen and examined. Agree with plan as detailed in PA/NP Note.     - c/w losartan for HTN    Lina Pearson MD  Pager: [phone]
Patient seen and examined. Agree with plan as detailed in PA/NP Note.     Plan for EGD/c-scope tomorrow, no chest pain, not in decompensated CHF no severe valvular stenosis on last TTE, optimized for EGD and c-scope from cardio perspective    Lina Pearson MD  Pager: [phone]

## 2024-05-01 NOTE — PROGRESS NOTE ADULT - PROBLEM SELECTOR PLAN 6
- F: none  - E: replete K<4, Mg<2  - N: CLD  - D: lovenox 40mg q24h - held for c-scope  - G: protonix 40mg daily    code: full  dispo:; PT recs home no needs   Will plan for discharge , outpt GI f/u for pathology report, will d/w GI, if pt will benefit from Motility study as outpt   Plan of care d/w pt and ACP

## 2024-05-01 NOTE — PROGRESS NOTE ADULT - ASSESSMENT
63yo w/ PMHx HTN, hypothyroidism presenting w/ weakness/fatigue and reports of weight loss up to 16lbs. GI is consulted for abdominal pain and weight loss.    #Weight loss, abdominal pain  Etiology of above unclear at this time. S/P EGD/Colon 4/30 with small HH; gastritis s/p biopsy; colonoscopy      Recommendations:  ***   65yo w/ PMHx HTN, hypothyroidism presenting w/ weakness/fatigue and reports of weight loss up to 16lbs. GI is consulted for abodminal pain and weight loss.    #Weight loss, abdominal pain  Etiology of above unclear at this time. S/P EGD/colonoscopy unrevealing for etiology of weight loss. EGD with HH; gastritis s/p bx; colonoscopy with IH, otherwise unremarkable although prep not optimal    Recommendations:  - CT C/A/P  - follow up path  - repeat colonoscopy in 3-5 years for screening purposes given bowel prep  - will sign off at this time, however please call back with questions or should any worsening/persistent issues arise. Follow up in Gastroenterology Clinic: [phone] ([facility] at [address] [place] [address]) or [phone] ([facility] at [address] [place] [address]) or [phone] ([facility] at [address] [place] [address])  or [place] Office: [phone] ([address] [place] [address]. [address] [place], [place], [address])    Note and recommendations are incomplete until reviewed and attested by attending.    Joey Davis MD  GI/Hepatology Fellow, PGY4  [employer] Pager [phone] or [facility] Pager [phone]  Teams preferred (7AM to 5PM); after 5PM, call GI fellow on call    On Weekends/Holidays (All Day) and Weekdays after 5PM to 8AM  For non-urgent consults, please email [email or web address] and [email or web address]  For urgent consults, please contact on call GI team. See Amion schedule ([facility]), [employer] paging system ([facility]), or call hospital  ([facility]) 65yo w/ PMHx HTN, hypothyroidism presenting w/ weakness/fatigue and reports of weight loss up to 16lbs. GI is consulted for abodminal pain and weight loss.    #Weight loss, abdominal pain  Etiology of above unclear at this time. S/P EGD/colonoscopy unrevealing for etiology of weight loss. EGD with HH; gastritis s/p bx; colonoscopy with IH, otherwise unremarkable although prep not optimal    Recommendations:  - CT C/A/P pending  - follow up path  - repeat colonoscopy in 3-5 years for screening purposes given bowel prep  - will sign off at this time, however please call back with questions or should any worsening/persistent issues arise. Follow up in Gastroenterology Clinic: [phone] ([facility] at [address] [place] [address]) or [phone] ([facility] at [address] [place] [address]) or [phone] ([facility] at [address] [place] [address])  or [place] Office: [phone] ([address] [place] [address]. [address] [place], [address])    Note and recommendations are incomplete until reviewed and attested by attending.    Joey Davis MD  GI/Hepatology Fellow, PGY4  [employer] [place] Pager [phone] or [facility] Pager [phone]  Teams preferred (7AM to 5PM); after 5PM, call GI fellow on call    On Weekends/Holidays (All Day) and Weekdays after 5PM to 8AM  For non-urgent consults, please email [email or web address] and [email or web address]  For urgent consults, please contact on call GI team. See Amion schedule ([facility]), [employer] paging system ([facility]), or call hospital  ([facility])

## 2024-05-01 NOTE — PROGRESS NOTE ADULT - SUBJECTIVE AND OBJECTIVE BOX
DATE OF SERVICE: 05-01-24    Patient denies chest pain or shortness of breath.   Review of symptoms otherwise negative.    Review of Systems:   Constitutional: [ ] fevers, [ ] chills.   Skin: [ ] dry skin. [ ] rashes.  Psychiatric: [ ] depression, [ ] anxiety.   Gastrointestinal: [ ] BRBPR, [ ] melena.   Neurological: [ ] confusion. [ ] seizures. [ ] shuffling gait.   Ears,Nose,Mouth and Throat: [ ] ear pain [ ] sore throat.   Eyes: [ ] diplopia.   Respiratory: [ ] hemoptysis. [ ] shortness of breath  Cardiovascular: See HPI above  Hematologic/Lymphatic: [ ] anemia. [ ] painful nodes. [ ] prolonged bleeding.   Genitourinary: [ ] hematuria. [ ] flank pain.   Endocrine: [ ] significant change in weight. [ ] intolerance to heat and cold.     Review of systems [x ] otherwise negative, [ ] otherwise unable to obtain    FH: no family history of sudden cardiac death in first degree relatives    SH: [ ] tobacco, [ ] alcohol, [ ] drugs    acetaminophen     Tablet .. 650 milliGRAM(s) Oral every 6 hours PRN  aluminum hydroxide/magnesium hydroxide/simethicone Suspension 30 milliLiter(s) Oral every 6 hours PRN  levothyroxine 25 MICROGram(s) Oral daily  losartan 50 milliGRAM(s) Oral daily  melatonin 3 milliGRAM(s) Oral at bedtime PRN  pantoprazole    Tablet 40 milliGRAM(s) Oral before breakfast  polyethylene glycol 3350 17 Gram(s) Oral two times a day  senna 2 Tablet(s) Oral at bedtime  simethicone 80 milliGRAM(s) Chew three times a day                          11.7   7.03  )-----------( 236      ( 01 May 2024 05:15 )             36.3       138  |  105  |  12  ----------------------------<  138<H>  4.3   |  24  |  0.37<L>    Ca    8.6      01 May 2024 05:15  Phos  2.9     05-01  Mg     1.90     05-01      T(C): 36.4 (05-01-24 @ 09:35), Max: 37.1 (04-30-24 @ 17:45)  HR: 73 (05-01-24 @ 09:35) (64 - 73)  BP: 106/62 (05-01-24 @ 09:35) (99/65 - 115/71)  RR: 18 (05-01-24 @ 09:35) (18 - 18)  SpO2: 100% (05-01-24 @ 09:35) (97% - 100%)  Wt(kg): --    General:  Alert and Oriented * 3.   Head: Normocephalic and atraumatic.   Neck: No JVD. No bruits. Supple. Does not appear to be enlarged.   Cardiovascular: + S1,S2 ; RRR Soft systolic murmur at the left lower sternal border. No rubs noted.    Lungs: CTA b/l. No rhonchi, rales or wheezes.   Abdomen: + BS, soft. Non tender. Non distended. No rebound. No guarding.   Extremities: No clubbing/cyanosis/edema.   Neurologic: Moves all four extremities. Full range of motion.   Skin: Warm and moist. The patient's skin has normal elasticity and good skin turgor.   Psychiatric: Appropriate mood and affect.  Musculoskeletal: Normal range of motion, normal strength     TELEMETRY: NSR	      ECG:  	Sinus, Sinus Arrhymia    Stress 04/2023  Conclusions: Normal study  Normal maximal exercise treadmill stress test.  Fair exercise performance.  No evidence of stress induced ischemia.       LE Dopplers:  Conclusions: Normal study  Normal maximal exercise treadmill stress test.  Fair exercise performance.  No evidence of stress induced ischemia.       Conclusions:  Complete bilateral study performed.  No other hemodynamically significant stenoses are identified in the lower extremity  arterial system, bilaterally    TTE  06/2022  Findings:  1. Normal left ventricular size and function.  Mild diastolic dysfunction.  2. Normal left atrial size  3. Right atrial cavity is normal in size.  4. Normal right ventricular size and function.  5. Normal trileaflet aortic valve opening.  6. Normal mitral valve opening.  7. Normal appearing tricuspid valve with mild tricuspid  regurgitation.  8. Pulmonic valve is grossly normal, yet poorly visualized  with no doppler evidence for pulmonic stenosis.  9. No evidence of significant pericardial effusion.  10. The aortic root is normal.  11. Normal pulmonary artery.  12. IVC is normal with respiratory variation.  FULL STUDY DONE INCLUDING M-MODE RECORDING,  SPECTRAL DOPPLER AND COLOR FLOW  ECHOCARDIOGRAPHY.      < from: Colonoscopy (04.30.24 @ 08:11) >  indings:       The perianal and digital rectal examinations were normal.       Non-bleeding internal hemorrhoids were found during retroflexion and        during endoscopy. The hemorrhoids were small.       A moderate amount of stool was found in the entire colon, interfering        with visualization. The majority was cleared with irrigation/suction,        but there were a few patches of solid debris that could not be cleared        and small underlying lesions may have been missed.       The exam was otherwise normal throughout the examined colon.                                                                        Impression:          - Non-bleeding internal hemorrhoids.                       - Stool in the entire examined colon.                       - No specimens collected.  Recommendation:      - Consider repeat colonoscopy in 3 - 5 years for                        screening purposes given prep quality.                       - Proceed with EGD as previously planned.      < from: Upper Endoscopy (04.30.24 @ 08:10) >  Findings:       A small hiatal hernia was present.       The exam of the esophagus was otherwise normal.       Patchy minimal inflammation characterized by erythema was found in the        stomach. Biopsies were taken with a cold forceps for Helicobacter pylori        testing.       The duodenal bulb and second portion of the duodenum were normal.                                                                                   Impression:          - Small hiatal hernia.                - Gastritis. Biopsied.                       - Normal duodenal bulb and second portion of the                        duodenum.  Recommendation:      - Await pathology results.                       - Advance diet as tolerated.              - Suggest cross-sectional imaging (chest/abd/pelvis) to                        further evaluation significant unintentional weight loss.      < from: CT Chest w/ IV Cont (05.01.24 @ 12:04) >  IMPRESSION:  No evidence of suspicious mass or adenopathy in the chest, abdomen, or   pelvis.    < end of copied text >      ASSESSMENT/PLAN: 	Patient is a 64 year-old female with history of HTN, hypothyroidism who presents with complaints of weakness/fatigue as well as unintentional weight loss of around 15-16 pounds. Cardiology following for near syncope    Weakness/Near Syncope  - suspect due to orthostasis from decreased intake  - c/w losartan for now  - maintain tele while in hospital  - EGD/C-Scope as above, Rx of gastritis per GI  --CT CAP noted, no suspicious mass or adenopathy    F/U with Dr Polanco 5/16 at 12:45PM, [phone]    Kristen GAMEZ  [phone]

## 2024-05-01 NOTE — PROGRESS NOTE ADULT - REASON FOR ADMISSION
Weakness, constipation

## 2024-05-01 NOTE — DISCHARGE NOTE NURSING/CASE MANAGEMENT/SOCIAL WORK - NSDCFUADDAPPT_GEN_ALL_CORE_FT
Follow up with your primary care provider in 1-2 weeks of discharge.    Follow up with gastroenterology on discharge for pathology results from EGD performed on 4/30. It is recommended that you have a repeat colonoscopy in 3-5 years. You may also benefit from a nuclear medicine Gastric Emptying Study outpatient.

## 2024-05-01 NOTE — DISCHARGE NOTE PROVIDER - CARE PROVIDER_API CALL
Hayden hCristian  Internal Medicine  [address] [facility], [address]  [place] [address]  Phone: [phone]  Fax: [phone]  Follow Up Time:    Hayden ChristianSathish  Internal Medicine  [address] [facility], [address]  [place] [address]  Phone: [phone]  Fax: [phone]  Follow Up Time:     Bulmaro Polanco  Cardiology  [address] [facility], [address]  [place] [address]  Phone: [phone]  Fax: [phone]  Follow Up Time:

## 2024-05-01 NOTE — PROGRESS NOTE ADULT - SUBJECTIVE AND OBJECTIVE BOX
Chief Complaint:  Patient is a 64y old  Female who presents with a chief complaint of Weakness, constipation (30 Apr 2024 16:47)    Interval Events:   s/p EGD/colonoscopy yesterday  brief episode of abdominal pain this AM, which is improving; no nausea/vomiting    Allergies:  IV Contrast (Unknown)        Hospital Medications:  acetaminophen     Tablet .. 650 milliGRAM(s) Oral every 6 hours PRN  aluminum hydroxide/magnesium hydroxide/simethicone Suspension 30 milliLiter(s) Oral every 6 hours PRN  levothyroxine 25 MICROGram(s) Oral daily  losartan 50 milliGRAM(s) Oral daily  melatonin 3 milliGRAM(s) Oral at bedtime PRN  pantoprazole    Tablet 40 milliGRAM(s) Oral before breakfast  polyethylene glycol 3350 17 Gram(s) Oral two times a day  senna 2 Tablet(s) Oral at bedtime  simethicone 80 milliGRAM(s) Chew three times a day      PMHX/PSHX:  Episodic tension-type headache, not intractable    HTN (hypertension)    Hypothyroidism    S/P cholecystectomy        Family history:  FH: HTN (hypertension) (Mother)        PHYSICAL EXAM:   Vital Signs:  Vital Signs Last 24 Hrs  T(C): 36.4 (01 May 2024 09:35), Max: 37.1 (30 Apr 2024 17:45)  T(F): 97.5 (01 May 2024 09:35), Max: 98.8 (30 Apr 2024 17:45)  HR: 73 (01 May 2024 09:35) (64 - 73)  BP: 106/62 (01 May 2024 09:35) (99/65 - 115/71)  BP(mean): --  RR: 18 (01 May 2024 09:35) (18 - 18)  SpO2: 100% (01 May 2024 09:35) (97% - 100%)    Parameters below as of 01 May 2024 09:35  Patient On (Oxygen Delivery Method): room air      Daily     Daily     GENERAL:  No acute distress  HEENT:  no scleral icterus  CHEST:  no accessory muscle use  HEART:  Regular rate and rhythm  ABDOMEN:  Soft, non-tender, non-distended  EXTREMITIES:  No edema  SKIN:  No rash/ecchymoses  NEURO:  Alert and oriented x 3    LABS:                        11.7   7.03  )-----------( 236      ( 01 May 2024 05:15 )             36.3     Mean Cell Volume: 96.0 fL (05-01-24 @ 05:15)    05-01    138  |  105  |  12  ----------------------------<  138<H>  4.3   |  24  |  0.37<L>    Ca    8.6      01 May 2024 05:15  Phos  2.9     05-01  Mg     1.90     05-01        PT/INR - ( 30 Apr 2024 02:53 )   PT: 14.3 sec;   INR: 1.27 ratio         PTT - ( 30 Apr 2024 02:53 )  PTT:30.8 sec  Urinalysis Basic - ( 01 May 2024 05:15 )    Color: x / Appearance: x / SG: x / pH: x  Gluc: 138 mg/dL / Ketone: x  / Bili: x / Urobili: x   Blood: x / Protein: x / Nitrite: x   Leuk Esterase: x / RBC: x / WBC x   Sq Epi: x / Non Sq Epi: x / Bacteria: x                              11.7   7.03  )-----------( 236      ( 01 May 2024 05:15 )             36.3                         11.5   5.86  )-----------( 229      ( 30 Apr 2024 02:53 )             34.8                         11.5   4.91  )-----------( 218      ( 29 Apr 2024 05:14 )             35.7     < from: Upper Endoscopy (04.30.24 @ 08:10) >  Findings:       A small hiatal hernia was present.       The exam of the esophagus was otherwise normal.       Patchy minimal inflammation characterized by erythema was found in the        stomach. Biopsies were taken with a cold forceps for Helicobacter pylori        testing.       The duodenal bulb and second portion of the duodenum were normal.                                                                                     < end of copied text >      < from: Colonoscopy (04.30.24 @ 08:11) >  Findings:       The perianal and digital rectal examinations were normal.       Non-bleeding internal hemorrhoids were found during retroflexion and        during endoscopy. The hemorrhoids were small.       A moderate amount of stool was found in the entire colon, interfering        with visualization. The majority was cleared with irrigation/suction,        but there were a few patches of solid debris that could not be cleared        and small underlying lesions may have been missed.       The exam was otherwise normal throughout the examined colon.    < end of copied text >   Interval Events:   s/p EGD/colonoscopy yesterday  brief episode of abdominal pain this AM, which is improving; no nausea/vomiting    Allergies:  IV Contrast (Unknown)        Hospital Medications:  acetaminophen     Tablet .. 650 milliGRAM(s) Oral every 6 hours PRN  aluminum hydroxide/magnesium hydroxide/simethicone Suspension 30 milliLiter(s) Oral every 6 hours PRN  levothyroxine 25 MICROGram(s) Oral daily  losartan 50 milliGRAM(s) Oral daily  melatonin 3 milliGRAM(s) Oral at bedtime PRN  pantoprazole    Tablet 40 milliGRAM(s) Oral before breakfast  polyethylene glycol 3350 17 Gram(s) Oral two times a day  senna 2 Tablet(s) Oral at bedtime  simethicone 80 milliGRAM(s) Chew three times a day      PMHX/PSHX:  Episodic tension-type headache, not intractable    HTN (hypertension)    Hypothyroidism    S/P cholecystectomy        Family history:  FH: HTN (hypertension) (Mother)        PHYSICAL EXAM:   Vital Signs:  Vital Signs Last 24 Hrs  T(C): 36.4 (01 May 2024 09:35), Max: 37.1 (30 Apr 2024 17:45)  T(F): 97.5 (01 May 2024 09:35), Max: 98.8 (30 Apr 2024 17:45)  HR: 73 (01 May 2024 09:35) (64 - 73)  BP: 106/62 (01 May 2024 09:35) (99/65 - 115/71)  BP(mean): --  RR: 18 (01 May 2024 09:35) (18 - 18)  SpO2: 100% (01 May 2024 09:35) (97% - 100%)    Parameters below as of 01 May 2024 09:35  Patient On (Oxygen Delivery Method): room air      Daily     Daily     GENERAL:  No acute distress  HEENT:  no scleral icterus  CHEST:  no accessory muscle use  HEART:  Regular rate and rhythm  ABDOMEN:  Soft, non-tender, non-distended  EXTREMITIES:  No edema  SKIN:  No rash/ecchymoses  NEURO:  Alert and oriented x 3    LABS:                        11.7   7.03  )-----------( 236      ( 01 May 2024 05:15 )             36.3     Mean Cell Volume: 96.0 fL (05-01-24 @ 05:15)    05-01    138  |  105  |  12  ----------------------------<  138<H>  4.3   |  24  |  0.37<L>    Ca    8.6      01 May 2024 05:15  Phos  2.9     05-01  Mg     1.90     05-01        PT/INR - ( 30 Apr 2024 02:53 )   PT: 14.3 sec;   INR: 1.27 ratio         PTT - ( 30 Apr 2024 02:53 )  PTT:30.8 sec  Urinalysis Basic - ( 01 May 2024 05:15 )    Color: x / Appearance: x / SG: x / pH: x  Gluc: 138 mg/dL / Ketone: x  / Bili: x / Urobili: x   Blood: x / Protein: x / Nitrite: x   Leuk Esterase: x / RBC: x / WBC x   Sq Epi: x / Non Sq Epi: x / Bacteria: x                              11.7   7.03  )-----------( 236      ( 01 May 2024 05:15 )             36.3                         11.5   5.86  )-----------( 229      ( 30 Apr 2024 02:53 )             34.8                         11.5   4.91  )-----------( 218      ( 29 Apr 2024 05:14 )             35.7     < from: Upper Endoscopy (04.30.24 @ 08:10) >  Findings:       A small hiatal hernia was present.       The exam of the esophagus was otherwise normal.       Patchy minimal inflammation characterized by erythema was found in the        stomach. Biopsies were taken with a cold forceps for Helicobacter pylori        testing.       The duodenal bulb and second portion of the duodenum were normal.                                                                                     < end of copied text >      < from: Colonoscopy (04.30.24 @ 08:11) >  Findings:       The perianal and digital rectal examinations were normal.       Non-bleeding internal hemorrhoids were found during retroflexion and        during endoscopy. The hemorrhoids were small.       A moderate amount of stool was found in the entire colon, interfering        with visualization. The majority was cleared with irrigation/suction,        but there were a few patches of solid debris that could not be cleared        and small underlying lesions may have been missed.       The exam was otherwise normal throughout the examined colon.    < end of copied text >

## 2024-05-01 NOTE — DISCHARGE NOTE PROVIDER - NSFOLLOWUPCLINICS_GEN_ALL_ED_FT
[facility] Gastroenter[facility]  Gastroenterology  [address] [place] [address] [address]  [place] [address]  Phone: [phone]  Fax:

## 2024-05-01 NOTE — PROGRESS NOTE ADULT - PROBLEM SELECTOR PLAN 3
- pt p/w 15# unintentional weight loss x1mo  - no personal or family cancer hx  - w/u as above  - nutrition consulted, f/u recs
- as above
- pt p/w 15# unintentional weight loss x1mo  - no personal or family cancer hx  - w/u as above  - nutrition consulted, f/u recs
- as above
- as above

## 2024-05-01 NOTE — PROGRESS NOTE ADULT - PROVIDER SPECIALTY LIST ADULT
Anesthesia
Cardiology
Cardiology
Gastroenterology
Cardiology
Gastroenterology
Hospitalist

## 2024-05-01 NOTE — PROGRESS NOTE ADULT - PROBLEM SELECTOR PLAN 4
- c/w home losartan 50mg daily

## 2024-05-01 NOTE — PROGRESS NOTE ADULT - NUTRITIONAL ASSESSMENT
This patient has been assessed with a concern for Malnutrition and has been determined to have a diagnosis/diagnoses of Moderate protein-calorie malnutrition.    This patient is being managed with:   Diet Regular-  DASH/TLC {Sodium & Cholesterol Restricted} (DASH)  Entered: Apr 30 2024 12:23PM  
This patient has been assessed with a concern for Malnutrition and has been determined to have a diagnosis/diagnoses of Moderate protein-calorie malnutrition.    This patient is being managed with:   Diet Clear Liquid-  Entered: Apr 27 2024  9:15AM  
This patient has been assessed with a concern for Malnutrition and has been determined to have a diagnosis/diagnoses of Moderate protein-calorie malnutrition.    This patient is being managed with:   Diet Clear Liquid-  Entered: Apr 27 2024  9:15AM  
This patient has been assessed with a concern for Malnutrition and has been determined to have a diagnosis/diagnoses of Moderate protein-calorie malnutrition.    This patient is being managed with:   Diet Regular-  DASH/TLC {Sodium & Cholesterol Restricted} (DASH)  Entered: Apr 30 2024 12:23PM  
This patient has been assessed with a concern for Malnutrition and has been determined to have a diagnosis/diagnoses of Moderate protein-calorie malnutrition.    This patient is being managed with:   Diet Regular-  DASH/TLC {Sodium & Cholesterol Restricted} (DASH)  Entered: Apr 30 2024 12:23PM  
This patient has been assessed with a concern for Malnutrition and has been determined to have a diagnosis/diagnoses of Moderate protein-calorie malnutrition.    This patient is being managed with:   Diet NPO after Midnight-     NPO Start Date: 29-Apr-2024   NPO Start Time: 23:59  Entered: Apr 29 2024  2:58PM    Diet Clear Liquid-  Entered: Apr 27 2024  9:15AM

## 2024-05-01 NOTE — DISCHARGE NOTE PROVIDER - NSDCCPCAREPLAN_GEN_ALL_CORE_FT
PRINCIPAL DISCHARGE DIAGNOSIS  Diagnosis: Abdominal pain  Assessment and Plan of Treatment: You presented with abdominal pain , weight loss. You were evaluated by Gastrointestinal team, you underwent Endocopy , colonoscopy as well as CT scan of chest , abdomen and pelvis. Work up was unrevealing about the etiology of the abdominal Pain, Please follow up with GI for outptatient follow anf further evaluation of the pain      SECONDARY DISCHARGE DIAGNOSES  Diagnosis: Hypertension  Assessment and Plan of Treatment: Continue blood pressure medication regimen as directed. Monitor for any visual changes, headaches or dizziness.  Monitor blood pressure regularly.  Follow up with your PCP for further management for high blood pressure.      Diagnosis: Hypothyroidism  Assessment and Plan of Treatment: continue  your medications as directed. Follow up with PMD in 1-2 weeks    Diagnosis: Weight loss  Assessment and Plan of Treatment: Try small frequent meals, take ensure supplements . monitor weight, follow up with your PMD and GI in 1-2 weeks

## 2024-05-01 NOTE — PROGRESS NOTE ADULT - SUBJECTIVE AND OBJECTIVE BOX
ANESTHESIA POSTOP CHECK    64y Female POSTOP DAY 1     Vital Signs Last 24 Hrs  T(C): 36.7 (01 May 2024 06:00), Max: 37.1 (30 Apr 2024 17:45)  T(F): 98 (01 May 2024 06:00), Max: 98.8 (30 Apr 2024 17:45)  HR: 64 (01 May 2024 06:00) (64 - 93)  BP: 115/71 (01 May 2024 06:00) (99/53 - 115/71)  RR: 18 (01 May 2024 06:00) (11 - 18)  SpO2: 97% (01 May 2024 06:00) (97% - 100%)    Parameters below as of 01 May 2024 06:00  Patient On (Oxygen Delivery Method): room air            [ x] NO APPARENT ANESTHESIA COMPLICATIONS

## 2024-05-02 ENCOUNTER — TRANSCRIPTION ENCOUNTER (OUTPATIENT)
Age: 64
End: 2024-05-02

## 2024-05-02 ENCOUNTER — EMERGENCY (EMERGENCY)
Facility: HOSPITAL | Age: 64
LOS: 1 days | Discharge: HOME | End: 2024-05-02
Attending: EMERGENCY MEDICINE | Admitting: EMERGENCY MEDICINE
Payer: MEDICAID

## 2024-05-02 VITALS
OXYGEN SATURATION: 97 % | DIASTOLIC BLOOD PRESSURE: 73 MMHG | SYSTOLIC BLOOD PRESSURE: 129 MMHG | RESPIRATION RATE: 20 BRPM | TEMPERATURE: 98 F | HEIGHT: 63 IN | HEART RATE: 73 BPM

## 2024-05-02 DIAGNOSIS — Z90.49 ACQUIRED ABSENCE OF OTHER SPECIFIED PARTS OF DIGESTIVE TRACT: Chronic | ICD-10-CM

## 2024-05-02 LAB
APTT BLD: 27.3 SEC — SIGNIFICANT CHANGE UP (ref 24.5–35.6)
BASOPHILS # BLD AUTO: 0.03 K/UL — SIGNIFICANT CHANGE UP (ref 0–0.2)
BASOPHILS NFR BLD AUTO: 0.3 % — SIGNIFICANT CHANGE UP (ref 0–2)
CA-I BLD-SCNC: 1.16 MMOL/L — SIGNIFICANT CHANGE UP (ref 1.15–1.29)
EOSINOPHIL # BLD AUTO: 0.1 K/UL — SIGNIFICANT CHANGE UP (ref 0–0.5)
EOSINOPHIL NFR BLD AUTO: 1.1 % — SIGNIFICANT CHANGE UP (ref 0–6)
HCT VFR BLD CALC: 36.1 % — SIGNIFICANT CHANGE UP (ref 34.5–45)
HGB BLD-MCNC: 12 G/DL — SIGNIFICANT CHANGE UP (ref 11.5–15.5)
IANC: 5.77 K/UL — SIGNIFICANT CHANGE UP (ref 1.8–7.4)
IMM GRANULOCYTES NFR BLD AUTO: 0.2 % — SIGNIFICANT CHANGE UP (ref 0–0.9)
INR BLD: 1.06 RATIO — SIGNIFICANT CHANGE UP (ref 0.85–1.18)
LYMPHOCYTES # BLD AUTO: 2.27 K/UL — SIGNIFICANT CHANGE UP (ref 1–3.3)
LYMPHOCYTES # BLD AUTO: 26 % — SIGNIFICANT CHANGE UP (ref 13–44)
MCHC RBC-ENTMCNC: 31.9 PG — SIGNIFICANT CHANGE UP (ref 27–34)
MCHC RBC-ENTMCNC: 33.2 GM/DL — SIGNIFICANT CHANGE UP (ref 32–36)
MCV RBC AUTO: 96 FL — SIGNIFICANT CHANGE UP (ref 80–100)
MONOCYTES # BLD AUTO: 0.55 K/UL — SIGNIFICANT CHANGE UP (ref 0–0.9)
MONOCYTES NFR BLD AUTO: 6.3 % — SIGNIFICANT CHANGE UP (ref 2–14)
NEUTROPHILS # BLD AUTO: 5.77 K/UL — SIGNIFICANT CHANGE UP (ref 1.8–7.4)
NEUTROPHILS NFR BLD AUTO: 66.1 % — SIGNIFICANT CHANGE UP (ref 43–77)
NRBC # BLD: 0 /100 WBCS — SIGNIFICANT CHANGE UP (ref 0–0)
NRBC # FLD: 0 K/UL — SIGNIFICANT CHANGE UP (ref 0–0)
PLATELET # BLD AUTO: 262 K/UL — SIGNIFICANT CHANGE UP (ref 150–400)
PROTHROM AB SERPL-ACNC: 12 SEC — SIGNIFICANT CHANGE UP (ref 9.5–13)
RBC # BLD: 3.76 M/UL — LOW (ref 3.8–5.2)
RBC # FLD: 12 % — SIGNIFICANT CHANGE UP (ref 10.3–14.5)
SURGICAL PATHOLOGY STUDY: SIGNIFICANT CHANGE UP
WBC # BLD: 8.74 K/UL — SIGNIFICANT CHANGE UP (ref 3.8–10.5)
WBC # FLD AUTO: 8.74 K/UL — SIGNIFICANT CHANGE UP (ref 3.8–10.5)

## 2024-05-02 PROCEDURE — 93010 ELECTROCARDIOGRAM REPORT: CPT

## 2024-05-02 PROCEDURE — 99284 EMERGENCY DEPT VISIT MOD MDM: CPT

## 2024-05-02 NOTE — ED PROVIDER NOTE - PATIENT PORTAL LINK FT
You can access the FollowMyHealth Patient Portal offered by [facility] by registering at the following website: [email or web address]. By joining [facility]’s FollowMyHealth portal, you will also be able to view your health information using other applications (apps) compatible with our system.

## 2024-05-02 NOTE — ED PROVIDER NOTE - PHYSICAL EXAMINATION
GENERAL: well appearing in no acute distress, non-toxic appearing  HEAD: normocephalic, atraumatic  CARDIAC: regular rate and rhythm, normal S1S2, no appreciable murmurs, 2+ pulses in UE/LE b/l  PULM: normal breath sounds, clear to ascultation bilaterally, no rales, rhonchi, wheezing  GI: abdomen nondistended, soft, nontender, no guarding, rebound tenderness  NEURO: no focal motor or sensory deficits, CN2-12 intact, normal speech, PERRLA, EOMI,   MSK: no peripheral edema, no calf tenderness b/l  SKIN: well-perfused, extremities warm, no visible rashes

## 2024-05-02 NOTE — ED ADULT NURSE NOTE - OBJECTIVE STATEMENT
64 year old female brought to room 15. Pt was discharged from this hospital Yesterday after being admitted for abdominal pain and she had work up for that and she was told that everything is WNL. But since Yesterday Pt is c/o SOB and palpitation, generalized weakness, body aches and chest discomfort. Past history of HTN. EKG in chart  patient laying in semi fowlers position on the stretcher. patient alert and oriented times four. patient denies shortness of breath, chest pain, nausea, vomiting, chill, fever. Patient normal sinus on the monitor. Respirations equal and adequate. Patients 20 gauge left hand IV patent, no signs of infiltration. Safety measures in place, call bell within reach. Patient stable upon leaving the room.

## 2024-05-02 NOTE — ED PROVIDER NOTE - NSFOLLOWUPINSTRUCTIONS_ED_ALL_ED_FT
You were seen in the ED for palpitations, body aches, and generalized weakness. Your labs were within normal limits, and did not reveal any acute cardiac, infectious process.     Follow up with your Primary Doctor and Cardiologist within one week.    Return with any worsening pain, dizziness, focal weakness or any other concerning symptoms.

## 2024-05-02 NOTE — ED PROVIDER NOTE - OBJECTIVE STATEMENT
63yo w/ PMHx HTN, hypothyroidism presenting w/ weakness/fatigue and reports of weight loss up to 16lbs, and near syncopal events. cardiology consulted suspected diagnosis orthostasis from decreased PO. GI followed endoscopy performed small hiatal hernia, and patchy minimal inflammation in stomach consistent w gastritis, and CT chest a/p no acute findings. discharged on may 1st. presents to ed for persistent SOB w chest tightness at rest, and palpitations. also endorses generalized weakness/body aches and and chills. also says she had an episode of R facial tingling 7pm which resolved, no weakness at that time. dneies HA, dizziness, sensory/motor weakness, n/v, abdominal pain, diarrhea, leg swelling.

## 2024-05-02 NOTE — ED ADULT TRIAGE NOTE - CHIEF COMPLAINT QUOTE
Pt was discharged from this hospital Yesterday after being admitted for abdominal pain and she had work up for that and she was told that everything is WNL. But since Yesterday Pt is c/o SOB and palpitation, generalized weakness, body aches and chest discomfort. Past history of HTN.

## 2024-05-02 NOTE — ED PROVIDER NOTE - CLINICAL SUMMARY MEDICAL DECISION MAKING FREE TEXT BOX
MDM: 65yo w/ PMHx HTN, hypothyroidism presenting w/ weakness/fatigue and reports of weight loss up to 16lbs, and near syncopal events. cardiology consulted suspected diagnosis orthostasis from decreased PO. GI followed endoscopy performed small hiatal hernia, and patchy minimal inflammation in stomach consistent w gastritis, and CT chest a/p no acute findings.discharged on may 1st. presents to ed for persistnet SOB w chest tightness at rest, and palpitations. also endorses generalized weakness/body aches and and chills. also says she had an episode of R facial tingling 7pm which resolved, no weakness at that time. dneies HA, dizziness, sensory/motor weakness, n/v, abdominal pain, diarrhea, leg swelling.     on arrival vitals within normal limits listed below, physical exam unremarkable, no m/r/g, lungs clear b/l, abdomen soft non tender, no peripheral edema, no focal neuro deficits, given reported facial tingling but no neuro deficits, do not think this is anything acutely neurologic going on, will check electrolytes, r/o acs given chest pain and palpitations and new inversion in v2,3, rvp given diffuse body aches/infectious workup, and pending labs make final decision on disposition given comprehensive recent workup.

## 2024-05-02 NOTE — ED PROVIDER NOTE - ATTENDING CONTRIBUTION TO CARE
Resident MDM: " 63yo w/ PMHx HTN, hypothyroidism presenting w/ weakness/fatigue and reports of weight loss up to 16lbs, and near syncopal events. cardiology consulted suspected diagnosis orthostasis from decreased PO. GI followed endoscopy performed small hiatal hernia, and patchy minimal inflammation in stomach consistent w gastritis, and CT chest a/p no acute findings.discharged on may 1st. presents to ed for persistnet SOB w chest tightness at rest, and palpitations. also endorses generalized weakness/body aches and and chills. also says she had an episode of R facial tingling 7pm which resolved, no weakness at that time. dneies HA, dizziness, sensory/motor weakness, n/v, abdominal pain, diarrhea, leg swelling.     on arrival vitals within normal limits listed below, physical exam unremarkable, no m/r/g, lungs clear b/l, abdomen soft non tender, no peripheral edema, no focal neuro deficits, given reported facial tingling but no neuro deficits, do not think this is anything acutely neurologic going on, will check electrolytes, r/o acs given chest pain and palpitations and new inversion in v2,3, rvp given diffuse body aches/infectious workup, and pending labs make final decision on disposition given comprehensive recent workup. "  INDEPENDENT FINDINGS: None     Vital Signs Last 24 Hrs  T(F): 98.1 HR: 71 BP: 123/80 RR: 19 SpO2: 99% (02 May 2024 22:07)   PE: as described; my additions and exceptions are noted in the chart    DATA:  EKG new inversions V1-3  LAB: Pending at time of eval      IMPRESSION/RISK:  Dx=  chest tightness   Consideration include new inversions in anterior leads "V2-3" of little concern but given recent nature will check trop and electrolytes; paresthesia not due to CVDz    Plan  as above  Will also check RVP    reassurance  if w/u negative will d/c with followup to [facility]

## 2024-05-03 VITALS
TEMPERATURE: 97 F | SYSTOLIC BLOOD PRESSURE: 133 MMHG | OXYGEN SATURATION: 100 % | HEART RATE: 76 BPM | RESPIRATION RATE: 15 BRPM | DIASTOLIC BLOOD PRESSURE: 78 MMHG

## 2024-05-03 LAB
ALBUMIN SERPL ELPH-MCNC: 4.4 G/DL — SIGNIFICANT CHANGE UP (ref 3.3–5)
ALP SERPL-CCNC: 87 U/L — SIGNIFICANT CHANGE UP (ref 40–120)
ALT FLD-CCNC: 71 U/L — HIGH (ref 4–33)
ANION GAP SERPL CALC-SCNC: 12 MMOL/L — SIGNIFICANT CHANGE UP (ref 7–14)
AST SERPL-CCNC: 46 U/L — HIGH (ref 4–32)
BILIRUB SERPL-MCNC: 0.7 MG/DL — SIGNIFICANT CHANGE UP (ref 0.2–1.2)
BUN SERPL-MCNC: 13 MG/DL — SIGNIFICANT CHANGE UP (ref 7–23)
CALCIUM SERPL-MCNC: 8.8 MG/DL — SIGNIFICANT CHANGE UP (ref 8.4–10.5)
CHLORIDE SERPL-SCNC: 105 MMOL/L — SIGNIFICANT CHANGE UP (ref 98–107)
CO2 SERPL-SCNC: 24 MMOL/L — SIGNIFICANT CHANGE UP (ref 22–31)
CREAT SERPL-MCNC: 0.41 MG/DL — LOW (ref 0.5–1.3)
EGFR: 110 ML/MIN/1.73M2 — SIGNIFICANT CHANGE UP
FLUAV AG NPH QL: SIGNIFICANT CHANGE UP
FLUBV AG NPH QL: SIGNIFICANT CHANGE UP
GLUCOSE SERPL-MCNC: 122 MG/DL — HIGH (ref 70–99)
MAGNESIUM SERPL-MCNC: 2.1 MG/DL — SIGNIFICANT CHANGE UP (ref 1.6–2.6)
NT-PROBNP SERPL-SCNC: 50 PG/ML — SIGNIFICANT CHANGE UP
PHOSPHATE SERPL-MCNC: 3 MG/DL — SIGNIFICANT CHANGE UP (ref 2.5–4.5)
POTASSIUM SERPL-MCNC: 3.9 MMOL/L — SIGNIFICANT CHANGE UP (ref 3.5–5.3)
POTASSIUM SERPL-SCNC: 3.9 MMOL/L — SIGNIFICANT CHANGE UP (ref 3.5–5.3)
PROT SERPL-MCNC: 6.5 G/DL — SIGNIFICANT CHANGE UP (ref 6–8.3)
RSV RNA NPH QL NAA+NON-PROBE: SIGNIFICANT CHANGE UP
SARS-COV-2 RNA SPEC QL NAA+PROBE: SIGNIFICANT CHANGE UP
SODIUM SERPL-SCNC: 141 MMOL/L — SIGNIFICANT CHANGE UP (ref 135–145)
TROPONIN T, HIGH SENSITIVITY RESULT: 10 NG/L — SIGNIFICANT CHANGE UP
TSH SERPL-MCNC: 2.82 UIU/ML — SIGNIFICANT CHANGE UP (ref 0.27–4.2)

## 2024-05-08 ENCOUNTER — NON-APPOINTMENT (OUTPATIENT)
Age: 64
End: 2024-05-08

## 2024-07-11 ENCOUNTER — APPOINTMENT (OUTPATIENT)
Dept: SURGERY | Facility: CLINIC | Age: 64
End: 2024-07-11
Payer: MEDICAID

## 2024-07-11 VITALS
SYSTOLIC BLOOD PRESSURE: 121 MMHG | BODY MASS INDEX: 23.7 KG/M2 | HEIGHT: 67 IN | OXYGEN SATURATION: 97 % | HEART RATE: 66 BPM | DIASTOLIC BLOOD PRESSURE: 77 MMHG | WEIGHT: 151 LBS

## 2024-07-11 DIAGNOSIS — Z12.39 ENCOUNTER FOR OTHER SCREENING FOR MALIGNANT NEOPLASM OF BREAST: ICD-10-CM

## 2024-07-11 PROCEDURE — 99213 OFFICE O/P EST LOW 20 MIN: CPT

## 2024-07-11 RX ORDER — UBIDECARENONE/VIT E ACET 100MG-5
CAPSULE ORAL
Refills: 0 | Status: ACTIVE | COMMUNITY

## 2024-07-11 RX ORDER — CALCIUM CARBONATE/VITAMIN D3 600 MG-10
TABLET ORAL
Refills: 0 | Status: ACTIVE | COMMUNITY

## 2024-12-14 NOTE — ED ADULT NURSE NOTE - CHIEF COMPLAINT
[facility]    Medicine Progress Note - Hospitalist Service    Date of Admission:  10/2/2024    Assessment & Plan     Summary of Stay: Jemal Gray is a 88 year old male with history of dementia with chronic aphasia. He was admitted on 10/2/2024 after he was brought to the ED by EMS from facility for evaluation of agitation. He had a prolonged hospitalization from 6/5/24 through 10/2/24 during which he was treated for cellulitis but mostly awaited long-term care placement due to dementia. He required appointment of guardianship and ultimately discharged to a long-term care facility on 10/2. Upon transfer to the long-term care facility he became agitated with swearing.  He was wandering around the long-term care facility, entering other patient's rooms and staff was unable to redirect him. He required transfer back to the ED and was readmitted.     In the ED he was stable, no major acute medical issue. He was given seroquel.  He required a bedside attendant as he was agitated and unable to be redirected.  He attempted to leave the emergency department and go into other patient's rooms. His facility was not able to take him back and there was no immediate safe disposition option.  He is here under FDC care status.  Patient has improved and is no longer agitated. He remains medically stable and can be discharged whenever placement is found. Prior long term care facility is reportedly not taking patient back.  is looking into memory care units.     Patient has been here since 10/2/244 awaiting placement.  Elderly waiver appointment completed on 12/9.  [facility] has accepted the patient and patient's daughter/guardian Jose Eduardo has reserved a suite     -Patient remains FDC care.  No change in management  -Await long-term placement, anticipating discharge to [facility] likely on 12/18 per case management/social work        Safe Disposition/FPC Care:  -Patient  remains medically stable for discharge when location found.  SW/CM following and working on options.     -Patient had prolonged hospitalization due to need for disposition assistance in earlier 2024.  His daughter filed for guardianship paperwork which was completed in July.  Acquired MA application and long-term care placement.    -Ultimately will require 24/7 care and Memory care placement      Acute agitation, resolved  Behavior disturbances   Dementia with chronic aphasia:  Suspect triggered by changes in setting and recent move.  No physical complaints or concerns of infection.  Historically required Zyprexa over the summer when he was hospitalized for agitation.  Most recently had been prescribed Seroquel 12.5 mg twice daily as needed although he had not been needing most recently.  -Continue Seroquel PRN  -SW consulted for discharge planning  -Plan to discharge to [facility] on 12/18.     Actinic Keratosis:  -Had surgery for this and prescribed steroids cream and ketoconazole cream, restarted per daughter request previously.     Sinus Bradycardia:  -Patient was noted to have HR in 50s.  Asymptomatic.  -no telemetry monitoring needed      History of Constipation:  -laxatives PRN.  Had BM 11/4.               Diet: Combination Diet Regular Diet  Snacks/Supplements Pediatric: Ensure Enlive; Between Meals  Room Service    DVT Prophylaxis: Pneumatic Compression Devices  Maddox Catheter: Not present  Lines: None     Cardiac Monitoring: None  Code Status: No CPR- Do NOT Intubate      Clinically Significant Risk Factors Present on Admission                       # Dementia: noted on problem list                  Social Drivers of Health    Food Insecurity: Unknown (10/3/2024)    Food Insecurity     Within the past 12 months, did you worry that your food would run out before you got money to buy more?: Patient unable to answer     Within the past 12 months, did the food you bought just not last and you didn t have  money to get more?: Patient unable to answer   Housing Stability: Unknown (10/3/2024)    Housing Stability     Do you have housing? : Patient unable to answer     Are you worried about losing your housing?: Patient unable to answer   Tobacco Use: Medium Risk (6/3/2024)    Received from [facility]    Patient History     Smoking Tobacco Use: Former     Smokeless Tobacco Use: Never   Financial Resource Strain: Unknown (10/3/2024)    Financial Resource Strain     Within the past 12 months, have you or your family members you live with been unable to get utilities (heat, electricity) when it was really needed?: Patient unable to answer   Transportation Needs: Unknown (10/3/2024)    Transportation Needs     Within the past 12 months, has lack of transportation kept you from medical appointments, getting your medicines, non-medical meetings or appointments, work, or from getting things that you need?: Patient unable to answer   Interpersonal Safety: Unknown (10/4/2024)    Interpersonal Safety     Do you feel physically and emotionally safe where you currently live?: Patient declined     Within the past 12 months, have you been hit, slapped, kicked or otherwise physically hurt by someone?: Patient declined     Within the past 12 months, have you been humiliated or emotionally abused in other ways by your partner or ex-partner?: Patient declined    Received from [facility]    Social Connections          Disposition Plan     Medically Ready for Discharge: Ready Now             Merlyn Rodriguez MD, MD  Hospitalist Service    Securely message with [employer] (more info)  Text page via [facility] Paging/Directory   ______________________________________________________________________    Interval History   Nursing notes reviewed.  Patient was seen at bedside.  Pleasantly confused, no complaints. Comfortably lying in bed.  Updated  "patient's family at bedside      Physical Exam   Vital Signs:     BP: 132/59 Pulse: 56   Resp: 20 SpO2: 97 % O2 Device: None (Room air)    Weight: 157 lbs 6.4 oz    Gen:  NAD, awake, speech garbled.  Unable to articulate any concerns. Humming and singing. \"Thumbs up\"  Eyes:   sclera anicteric.  Neck:  Supple.  CV:   Lung:  normal effort in RA  Abd:    Skin: No rash.  Ext:  No pitting edema LE b/l.      Medical Decision Making       10 MINUTES SPENT BY ME on the date of service doing chart review, history, exam, documentation & further activities per the note.      Data         Imaging results reviewed over the past 24 hrs:   No results found for this or any previous visit (from the past 24 hours).  " The patient is a 60y Female complaining of headache.

## 2025-03-06 ENCOUNTER — EMERGENCY (EMERGENCY)
Facility: HOSPITAL | Age: 65
LOS: 1 days | Discharge: HOME | End: 2025-03-06
Admitting: EMERGENCY MEDICINE
Payer: MEDICARE

## 2025-03-06 VITALS
DIASTOLIC BLOOD PRESSURE: 74 MMHG | SYSTOLIC BLOOD PRESSURE: 125 MMHG | TEMPERATURE: 98 F | RESPIRATION RATE: 16 BRPM | OXYGEN SATURATION: 98 % | HEART RATE: 60 BPM

## 2025-03-06 VITALS
HEART RATE: 88 BPM | WEIGHT: 164.91 LBS | DIASTOLIC BLOOD PRESSURE: 83 MMHG | SYSTOLIC BLOOD PRESSURE: 143 MMHG | RESPIRATION RATE: 16 BRPM | HEIGHT: 62 IN | OXYGEN SATURATION: 99 % | TEMPERATURE: 98 F

## 2025-03-06 DIAGNOSIS — Z90.49 ACQUIRED ABSENCE OF OTHER SPECIFIED PARTS OF DIGESTIVE TRACT: Chronic | ICD-10-CM

## 2025-03-06 LAB
ALBUMIN SERPL ELPH-MCNC: 4.3 G/DL — SIGNIFICANT CHANGE UP (ref 3.3–5)
ALP SERPL-CCNC: 76 U/L — SIGNIFICANT CHANGE UP (ref 40–120)
ALT FLD-CCNC: 20 U/L — SIGNIFICANT CHANGE UP (ref 4–33)
ANION GAP SERPL CALC-SCNC: 11 MMOL/L — SIGNIFICANT CHANGE UP (ref 7–14)
APPEARANCE UR: CLEAR — SIGNIFICANT CHANGE UP
AST SERPL-CCNC: 18 U/L — SIGNIFICANT CHANGE UP (ref 4–32)
BACTERIA # UR AUTO: NEGATIVE /HPF — SIGNIFICANT CHANGE UP
BASOPHILS # BLD AUTO: 0.05 K/UL — SIGNIFICANT CHANGE UP (ref 0–0.2)
BASOPHILS NFR BLD AUTO: 0.6 % — SIGNIFICANT CHANGE UP (ref 0–2)
BILIRUB SERPL-MCNC: 0.6 MG/DL — SIGNIFICANT CHANGE UP (ref 0.2–1.2)
BILIRUB UR-MCNC: NEGATIVE — SIGNIFICANT CHANGE UP
BUN SERPL-MCNC: 17 MG/DL — SIGNIFICANT CHANGE UP (ref 7–23)
CALCIUM SERPL-MCNC: 8.9 MG/DL — SIGNIFICANT CHANGE UP (ref 8.4–10.5)
CAST: 0 /LPF — SIGNIFICANT CHANGE UP (ref 0–4)
CHLORIDE SERPL-SCNC: 107 MMOL/L — SIGNIFICANT CHANGE UP (ref 98–107)
CO2 SERPL-SCNC: 24 MMOL/L — SIGNIFICANT CHANGE UP (ref 22–31)
COLOR SPEC: YELLOW — SIGNIFICANT CHANGE UP
CREAT SERPL-MCNC: 0.46 MG/DL — LOW (ref 0.5–1.3)
DIFF PNL FLD: NEGATIVE — SIGNIFICANT CHANGE UP
EGFR: 106 ML/MIN/1.73M2 — SIGNIFICANT CHANGE UP
EOSINOPHIL # BLD AUTO: 0.5 K/UL — SIGNIFICANT CHANGE UP (ref 0–0.5)
EOSINOPHIL NFR BLD AUTO: 6.3 % — HIGH (ref 0–6)
GLUCOSE SERPL-MCNC: 83 MG/DL — SIGNIFICANT CHANGE UP (ref 70–99)
GLUCOSE UR QL: NEGATIVE MG/DL — SIGNIFICANT CHANGE UP
HCT VFR BLD CALC: 35.9 % — SIGNIFICANT CHANGE UP (ref 34.5–45)
HGB BLD-MCNC: 11.6 G/DL — SIGNIFICANT CHANGE UP (ref 11.5–15.5)
IANC: 4.07 K/UL — SIGNIFICANT CHANGE UP (ref 1.8–7.4)
IMM GRANULOCYTES NFR BLD AUTO: 0.3 % — SIGNIFICANT CHANGE UP (ref 0–0.9)
KETONES UR-MCNC: NEGATIVE MG/DL — SIGNIFICANT CHANGE UP
LEUKOCYTE ESTERASE UR-ACNC: ABNORMAL
LIDOCAIN IGE QN: 46 U/L — SIGNIFICANT CHANGE UP (ref 7–60)
LYMPHOCYTES # BLD AUTO: 2.69 K/UL — SIGNIFICANT CHANGE UP (ref 1–3.3)
LYMPHOCYTES # BLD AUTO: 33.9 % — SIGNIFICANT CHANGE UP (ref 13–44)
MAGNESIUM SERPL-MCNC: 2.3 MG/DL — SIGNIFICANT CHANGE UP (ref 1.6–2.6)
MCHC RBC-ENTMCNC: 31.8 PG — SIGNIFICANT CHANGE UP (ref 27–34)
MCHC RBC-ENTMCNC: 32.3 G/DL — SIGNIFICANT CHANGE UP (ref 32–36)
MCV RBC AUTO: 98.4 FL — SIGNIFICANT CHANGE UP (ref 80–100)
MONOCYTES # BLD AUTO: 0.6 K/UL — SIGNIFICANT CHANGE UP (ref 0–0.9)
MONOCYTES NFR BLD AUTO: 7.6 % — SIGNIFICANT CHANGE UP (ref 2–14)
NEUTROPHILS # BLD AUTO: 4.07 K/UL — SIGNIFICANT CHANGE UP (ref 1.8–7.4)
NEUTROPHILS NFR BLD AUTO: 51.3 % — SIGNIFICANT CHANGE UP (ref 43–77)
NITRITE UR-MCNC: NEGATIVE — SIGNIFICANT CHANGE UP
NRBC # BLD AUTO: 0 K/UL — SIGNIFICANT CHANGE UP (ref 0–0)
NRBC # FLD: 0 K/UL — SIGNIFICANT CHANGE UP (ref 0–0)
NRBC BLD AUTO-RTO: 0 /100 WBCS — SIGNIFICANT CHANGE UP (ref 0–0)
PH UR: 6 — SIGNIFICANT CHANGE UP (ref 5–8)
PHOSPHATE SERPL-MCNC: 3.7 MG/DL — SIGNIFICANT CHANGE UP (ref 2.5–4.5)
PLATELET # BLD AUTO: 245 K/UL — SIGNIFICANT CHANGE UP (ref 150–400)
POTASSIUM SERPL-MCNC: 4 MMOL/L — SIGNIFICANT CHANGE UP (ref 3.5–5.3)
POTASSIUM SERPL-SCNC: 4 MMOL/L — SIGNIFICANT CHANGE UP (ref 3.5–5.3)
PROT SERPL-MCNC: 6.9 G/DL — SIGNIFICANT CHANGE UP (ref 6–8.3)
PROT UR-MCNC: NEGATIVE MG/DL — SIGNIFICANT CHANGE UP
RBC # BLD: 3.65 M/UL — LOW (ref 3.8–5.2)
RBC # FLD: 12.1 % — SIGNIFICANT CHANGE UP (ref 10.3–14.5)
RBC CASTS # UR COMP ASSIST: 0 /HPF — SIGNIFICANT CHANGE UP (ref 0–4)
REVIEW: SIGNIFICANT CHANGE UP
SODIUM SERPL-SCNC: 142 MMOL/L — SIGNIFICANT CHANGE UP (ref 135–145)
SP GR SPEC: 1.02 — SIGNIFICANT CHANGE UP (ref 1–1.03)
SQUAMOUS # UR AUTO: 2 /HPF — SIGNIFICANT CHANGE UP (ref 0–5)
UROBILINOGEN FLD QL: 0.2 MG/DL — SIGNIFICANT CHANGE UP (ref 0.2–1)
WBC # BLD: 7.93 K/UL — SIGNIFICANT CHANGE UP (ref 3.8–10.5)
WBC # FLD AUTO: 7.93 K/UL — SIGNIFICANT CHANGE UP (ref 3.8–10.5)
WBC UR QL: 4 /HPF — SIGNIFICANT CHANGE UP (ref 0–5)

## 2025-03-06 PROCEDURE — 99284 EMERGENCY DEPT VISIT MOD MDM: CPT

## 2025-03-06 PROCEDURE — 74176 CT ABD & PELVIS W/O CONTRAST: CPT | Mod: 26

## 2025-03-06 RX ORDER — ACETAMINOPHEN 500 MG/5ML
1000 LIQUID (ML) ORAL ONCE
Refills: 0 | Status: COMPLETED | OUTPATIENT
Start: 2025-03-06 | End: 2025-03-06

## 2025-03-06 RX ORDER — LIDOCAINE HYDROCHLORIDE 20 MG/ML
1 JELLY TOPICAL ONCE
Refills: 0 | Status: COMPLETED | OUTPATIENT
Start: 2025-03-06 | End: 2025-03-06

## 2025-03-06 RX ADMIN — LIDOCAINE HYDROCHLORIDE 1 PATCH: 20 JELLY TOPICAL at 20:33

## 2025-03-06 RX ADMIN — Medication 400 MILLIGRAM(S): at 20:37

## 2025-03-06 NOTE — ED PROVIDER NOTE - MUSCULOSKELETAL, MLM
Spine appears normal, range of motion is not limited, no muscle or joint tenderness. Negative stright leg raise

## 2025-03-06 NOTE — ED ADULT NURSE NOTE - OBJECTIVE STATEMENT
Pt received to intake room 15, A&Ox4, ambulatory. Pt presenting to the ED today complaining of lower back pain radiating to RLQ of abd. Pt states this has been ongoing since Saturday, worsening today. Positive ROM in all extremities. Pt denies c/p, SOB, headache, blurry vision, n/v/d, or fever like symptoms. 20G IV placed in the left AC, labs drawn and sent. Pt medicated as per MD orders. Respirations even and unlabored, NAD noted. Comfort measures provided, safety maintained. Plan of care ongoing.

## 2025-03-06 NOTE — ED PROVIDER NOTE - CLINICAL SUMMARY MEDICAL DECISION MAKING FREE TEXT BOX
65-year-old female with past medical history of hypertension, hypothyroidism, gastritis presenting with atraumatic bilateral lower back pain radiating into lower abdomen for the past 2 weeks.  Patient states history typically does workout and feels she may have strained herself while working out, however also had some constipation on Saturday.  Since then patient states she has had 2 bowel movements yesterday, and 2 today–no hematochezia or melena.  No blood per rectum.  Also endorses increased urinary frequency but pt states this is chronic for here.  Denies fevers, chills, saddle anesthesia, urine or bowel incontinence, foul odor to urine, change in urinary frequency, dysuria, hematuria, lower leg swelling or pain, paresthesias, weakness, fall, chest pain, shortness of breath, palpitations, nausea, vomiting, diarrhea, vaginal discharge or pelvic pain.    Differential diagnoses includes lumbago versus musculoskeletal spasm / strain versus sciatica. No back pain red flags on history or physical. Presentation not consistent with malignancy (lack of history of malignancy, lack of B symptoms), fracture (no trauma, no bony tenderness to palpation), cauda equina (no bowel or urinary incontinence/retention, no saddle anesthesia, no distal weakness), AAA, viscus perforation , pulmonary embolism, renal colic, pyelonephritis (afebrile, no CVAT, no urinary symptoms). Given the clinical picture, no indication for imaging at this time. Pt offer labs and if abnormal to peruse imaging but pt declined states she wants labs and CT. Discussed expectations that CT may take a few hours today to obtain.     Plan: pain control, labs, UA, supportive care, reassess

## 2025-03-06 NOTE — ED PROVIDER NOTE - PATIENT PORTAL LINK FT
You can access the FollowMyHealth Patient Portal offered by [facility] by registering at the following website: [email or web address]. By joining [facility]’s FollowMyHealth portal, you will also be able to view your health information using other applications (apps) compatible with our system.

## 2025-03-06 NOTE — ED PROVIDER NOTE - PROGRESS NOTE DETAILS
FRANCO Wilkinson Addendum----This patient was signed out to me by FRANCO Mckay pending CBC/CMP/UA and CT abd/pelvis.  As per sign-out discussion from FRANCO Mckay, there was very low clinical suspicion for intraabdominal etiology of patient's back pain; plan discussed was that if CT abd/pelvis negative for acute finding, pt may be dc'd home with close outpatient f/u advised.  CBC/CMP grossly unremarkable, UA: moderate leuk, negative nitrite, negative bacteria, 4 wbc, 2 epi cells.  UA results are of low suspicion for UTI, however UCX was sent and is testing; will be followed accordingly per usual departmental process.  Patient will be dc'd home per below instructions.

## 2025-03-06 NOTE — ED PROVIDER NOTE - NSFOLLOWUPINSTRUCTIONS_ED_ALL_ED_FT
Follow up with your primary care medical doctor within 2-3 days of ER discharge.  **Bring your discharge papers / test results with you to your follow up appointment.      If you ever need assistance finding a doctor to follow up with, you may call the [facility] Patient Access Services helpline at [phone] to find names/contact #s for a provider/specialist to follow up with.    A copy of your test results is being provided to you.  All results including incidental findings were reviewed at discharge.  Should you have any questions that arise after you are discharged, please feel free to contact the ER ([phone]) to have your questions answered.    Rest / no strenuous activity.  Stay well hydrated.    For pain, you may take ibuprofen (Motrin or Advil) or acetaminophen (Tylenol) as needed:   --ibuprofen (Motrin or Advil):  over-the-counter 200 milligram tablets:       Take three tablets (600 milligram dose) every 6 hours as needed for pain/inflammation; take WITH FOOD.   --acetaminophen (Tylenol):  over-the-counter; follow package instructions carefully regarding      dosage / usage.  Be careful not to exceed the total daily maximum dosage.      ***Return to the Emergency Department IMMEDIATELY if you experience any new / worsening symptoms or have any problems / concerns.***

## 2025-03-06 NOTE — ED ADULT TRIAGE NOTE - CHIEF COMPLAINT QUOTE
Patient c/o lower back pain x 5 days. Denies injury or heavy lifting. Reports it started when she was constipated, had a bowel movement today. Denies fever, abdominal pain, nausea, vomiting, urinary symptoms. Ambulatory in triage. Phx HTN, hypothyroid, gastritis

## 2025-03-06 NOTE — ED PROVIDER NOTE - MDM ORDERS SUBMITTED SELECTION
I have reviewed the surgical (or preoperative) H&P that is linked to this encounter, and examined the patient. There are no significant changes  
Imaging Studies/Medications

## 2025-03-07 RX ORDER — IBUPROFEN 200 MG
600 TABLET ORAL ONCE
Refills: 0 | Status: DISCONTINUED | OUTPATIENT
Start: 2025-03-07 | End: 2025-03-10

## 2025-03-08 LAB
CULTURE RESULTS: SIGNIFICANT CHANGE UP
SPECIMEN SOURCE: SIGNIFICANT CHANGE UP

## 2025-04-02 ENCOUNTER — NON-APPOINTMENT (OUTPATIENT)
Age: 65
End: 2025-04-02

## 2025-04-02 ENCOUNTER — APPOINTMENT (OUTPATIENT)
Dept: COLORECTAL SURGERY | Facility: CLINIC | Age: 65
End: 2025-04-02
Payer: MEDICAID

## 2025-04-02 VITALS
HEART RATE: 83 BPM | WEIGHT: 160 LBS | OXYGEN SATURATION: 97 % | BODY MASS INDEX: 25.11 KG/M2 | HEIGHT: 67 IN | SYSTOLIC BLOOD PRESSURE: 146 MMHG | DIASTOLIC BLOOD PRESSURE: 82 MMHG

## 2025-04-02 DIAGNOSIS — Z80.42 FAMILY HISTORY OF MALIGNANT NEOPLASM OF PROSTATE: ICD-10-CM

## 2025-04-02 DIAGNOSIS — K62.89 OTHER SPECIFIED DISEASES OF ANUS AND RECTUM: ICD-10-CM

## 2025-04-02 DIAGNOSIS — I10 ESSENTIAL (PRIMARY) HYPERTENSION: ICD-10-CM

## 2025-04-02 DIAGNOSIS — K64.9 UNSPECIFIED HEMORRHOIDS: ICD-10-CM

## 2025-04-02 PROCEDURE — 99203 OFFICE O/P NEW LOW 30 MIN: CPT | Mod: 25

## 2025-04-02 PROCEDURE — 46600 DIAGNOSTIC ANOSCOPY SPX: CPT

## 2025-04-03 PROBLEM — K64.9 HEMORRHOIDS: Status: ACTIVE | Noted: 2025-04-03

## 2025-04-03 RX ORDER — HYDROCORTISONE 25 MG/G
2.5 CREAM TOPICAL
Qty: 1 | Refills: 5 | Status: ACTIVE | COMMUNITY
Start: 2025-04-03 | End: 1900-01-01

## 2025-04-21 ENCOUNTER — APPOINTMENT (OUTPATIENT)
Dept: UROLOGY | Facility: CLINIC | Age: 65
End: 2025-04-21
Payer: MEDICAID

## 2025-04-21 VITALS
OXYGEN SATURATION: 97 % | DIASTOLIC BLOOD PRESSURE: 85 MMHG | HEIGHT: 67 IN | TEMPERATURE: 96.8 F | WEIGHT: 160 LBS | SYSTOLIC BLOOD PRESSURE: 137 MMHG | BODY MASS INDEX: 25.11 KG/M2 | HEART RATE: 74 BPM

## 2025-04-21 DIAGNOSIS — N39.41 URGE INCONTINENCE: ICD-10-CM

## 2025-04-21 DIAGNOSIS — N39.3 STRESS INCONTINENCE (FEMALE) (MALE): ICD-10-CM

## 2025-04-21 DIAGNOSIS — Z00.00 ENCOUNTER FOR GENERAL ADULT MEDICAL EXAMINATION W/OUT ABNORMAL FINDINGS: ICD-10-CM

## 2025-04-21 DIAGNOSIS — Z80.42 FAMILY HISTORY OF MALIGNANT NEOPLASM OF PROSTATE: ICD-10-CM

## 2025-04-21 PROCEDURE — 51798 US URINE CAPACITY MEASURE: CPT

## 2025-04-21 PROCEDURE — 99459 PELVIC EXAMINATION: CPT

## 2025-04-21 PROCEDURE — 99204 OFFICE O/P NEW MOD 45 MIN: CPT | Mod: 25

## 2025-04-22 LAB — BACTERIA UR CULT: NORMAL

## 2025-04-29 ENCOUNTER — APPOINTMENT (OUTPATIENT)
Dept: GASTROENTEROLOGY | Facility: CLINIC | Age: 65
End: 2025-04-29
Payer: MEDICAID

## 2025-04-29 VITALS
HEIGHT: 67 IN | TEMPERATURE: 97.2 F | BODY MASS INDEX: 25.11 KG/M2 | DIASTOLIC BLOOD PRESSURE: 74 MMHG | HEART RATE: 79 BPM | WEIGHT: 160 LBS | SYSTOLIC BLOOD PRESSURE: 114 MMHG | OXYGEN SATURATION: 98 %

## 2025-04-29 DIAGNOSIS — R10.84 GENERALIZED ABDOMINAL PAIN: ICD-10-CM

## 2025-04-29 DIAGNOSIS — K59.00 CONSTIPATION, UNSPECIFIED: ICD-10-CM

## 2025-04-29 PROCEDURE — 99213 OFFICE O/P EST LOW 20 MIN: CPT

## 2025-04-29 RX ORDER — OMEPRAZOLE 40 MG/1
40 CAPSULE, DELAYED RELEASE ORAL
Refills: 0 | Status: ACTIVE | COMMUNITY

## 2025-04-29 RX ORDER — LOSARTAN POTASSIUM 25 MG/1
25 TABLET, FILM COATED ORAL
Refills: 0 | Status: ACTIVE | COMMUNITY

## 2025-05-01 ENCOUNTER — NON-APPOINTMENT (OUTPATIENT)
Age: 65
End: 2025-05-01

## 2025-05-01 LAB
CRP SERPL-MCNC: <3 MG/L
ERYTHROCYTE [SEDIMENTATION RATE] IN BLOOD BY WESTERGREN METHOD: 12 MM/HR
FERRITIN SERPL-MCNC: 208 NG/ML
HCT VFR BLD CALC: 38 %
HGB BLD-MCNC: 12.2 G/DL
IRON SATN MFR SERPL: 25 %
IRON SERPL-MCNC: 82 UG/DL
MCHC RBC-ENTMCNC: 31.9 PG
MCHC RBC-ENTMCNC: 32.1 G/DL
MCV RBC AUTO: 99.5 FL
PLATELET # BLD AUTO: 254 K/UL
RBC # BLD: 3.82 M/UL
RBC # FLD: 12.3 %
T4 FREE SERPL-MCNC: 1.1 NG/DL
TIBC SERPL-MCNC: 327 UG/DL
TSH SERPL-ACNC: 2.29 UIU/ML
UIBC SERPL-MCNC: 245 UG/DL
WBC # FLD AUTO: 7.52 K/UL

## 2025-05-02 ENCOUNTER — NON-APPOINTMENT (OUTPATIENT)
Age: 65
End: 2025-05-02

## 2025-06-17 ENCOUNTER — APPOINTMENT (OUTPATIENT)
Dept: GASTROENTEROLOGY | Facility: CLINIC | Age: 65
End: 2025-06-17
Payer: MEDICARE

## 2025-06-17 VITALS
DIASTOLIC BLOOD PRESSURE: 78 MMHG | WEIGHT: 160 LBS | HEIGHT: 67 IN | TEMPERATURE: 98.1 F | SYSTOLIC BLOOD PRESSURE: 136 MMHG | BODY MASS INDEX: 25.11 KG/M2 | HEART RATE: 95 BPM | OXYGEN SATURATION: 98 %

## 2025-06-17 PROCEDURE — 99204 OFFICE O/P NEW MOD 45 MIN: CPT

## 2025-06-18 ENCOUNTER — NON-APPOINTMENT (OUTPATIENT)
Age: 65
End: 2025-06-18

## 2025-06-18 LAB
ALBUMIN SERPL ELPH-MCNC: 4.4 G/DL
ALP BLD-CCNC: 92 U/L
ALT SERPL-CCNC: 25 U/L
AST SERPL-CCNC: 17 U/L
BILIRUB DIRECT SERPL-MCNC: 0.23 MG/DL
BILIRUB INDIRECT SERPL-MCNC: 0.6 MG/DL
BILIRUB SERPL-MCNC: 0.9 MG/DL
PROT SERPL-MCNC: 6.9 G/DL

## 2025-09-02 ENCOUNTER — APPOINTMENT (OUTPATIENT)
Dept: GASTROENTEROLOGY | Facility: CLINIC | Age: 65
End: 2025-09-02
Payer: MEDICARE

## 2025-09-02 VITALS
SYSTOLIC BLOOD PRESSURE: 135 MMHG | HEIGHT: 67 IN | TEMPERATURE: 97.2 F | BODY MASS INDEX: 26.68 KG/M2 | HEART RATE: 99 BPM | OXYGEN SATURATION: 96 % | WEIGHT: 170 LBS | DIASTOLIC BLOOD PRESSURE: 87 MMHG

## 2025-09-02 DIAGNOSIS — R10.84 GENERALIZED ABDOMINAL PAIN: ICD-10-CM

## 2025-09-02 DIAGNOSIS — R21 RASH AND OTHER NONSPECIFIC SKIN ERUPTION: ICD-10-CM

## 2025-09-02 PROCEDURE — G2211 COMPLEX E/M VISIT ADD ON: CPT

## 2025-09-02 PROCEDURE — 99214 OFFICE O/P EST MOD 30 MIN: CPT

## 2025-09-02 RX ORDER — HYDROCORTISONE ACETATE CREAM 20 MG/G
2.5 CREAM TOPICAL 3 TIMES DAILY
Qty: 1 | Refills: 0 | Status: ACTIVE | COMMUNITY
Start: 2025-09-02 | End: 1900-01-01

## 2025-09-03 LAB
ALBUMIN SERPL ELPH-MCNC: 4.7 G/DL
ALP BLD-CCNC: 97 U/L
ALT SERPL-CCNC: 29 U/L
ANION GAP SERPL CALC-SCNC: 13 MMOL/L
AST SERPL-CCNC: 20 U/L
BILIRUB SERPL-MCNC: 0.8 MG/DL
BUN SERPL-MCNC: 15 MG/DL
CALCIUM SERPL-MCNC: 9.5 MG/DL
CHLORIDE SERPL-SCNC: 104 MMOL/L
CO2 SERPL-SCNC: 24 MMOL/L
CREAT SERPL-MCNC: 0.49 MG/DL
EGFRCR SERPLBLD CKD-EPI 2021: 105 ML/MIN/1.73M2
GLUCOSE SERPL-MCNC: 101 MG/DL
HCT VFR BLD CALC: 36.8 %
HGB BLD-MCNC: 11.6 G/DL
MCHC RBC-ENTMCNC: 31.4 PG
MCHC RBC-ENTMCNC: 31.5 G/DL
MCV RBC AUTO: 99.7 FL
PLATELET # BLD AUTO: 299 K/UL
POTASSIUM SERPL-SCNC: 4.3 MMOL/L
PROT SERPL-MCNC: 7.2 G/DL
RBC # BLD: 3.69 M/UL
RBC # FLD: 12.5 %
SODIUM SERPL-SCNC: 141 MMOL/L
UREA BREATH TEST QL: NEGATIVE
WBC # FLD AUTO: 7.66 K/UL

## 2025-09-05 DIAGNOSIS — B78.9 STRONGYLOIDIASIS, UNSPECIFIED: ICD-10-CM

## 2025-09-05 LAB — STRONGYLOIDES AB SER IA-ACNC: POSITIVE

## 2025-09-05 RX ORDER — IVERMECTIN 6 MG/1
6 TABLET ORAL DAILY
Qty: 6 | Refills: 0 | Status: ACTIVE | COMMUNITY
Start: 2025-09-05 | End: 1900-01-01

## 2025-09-09 ENCOUNTER — NON-APPOINTMENT (OUTPATIENT)
Age: 65
End: 2025-09-09

## 2025-09-09 ENCOUNTER — APPOINTMENT (OUTPATIENT)
Dept: MRI IMAGING | Facility: CLINIC | Age: 65
End: 2025-09-09
Payer: MEDICAID

## 2025-09-09 PROCEDURE — 74183 MRI ABD W/O CNTR FLWD CNTR: CPT

## 2025-09-09 PROCEDURE — A9585: CPT

## 2025-09-17 ENCOUNTER — NON-APPOINTMENT (OUTPATIENT)
Age: 65
End: 2025-09-17

## 2025-09-19 ENCOUNTER — NON-APPOINTMENT (OUTPATIENT)
Age: 65
End: 2025-09-19

## 2025-09-23 PROBLEM — K83.8 DILATED BILE DUCT: Status: ACTIVE | Noted: 2025-09-23

## (undated) DEVICE — BIOPSY FORCEP COLD DISP

## (undated) DEVICE — BITE BLOCK ADULT 20 X 27MM (GREEN)

## (undated) DEVICE — SALIVA EJECTOR (BLUE)

## (undated) DEVICE — BIOPSY FORCEP RADIAL JAW 4 STANDARD WITH NEEDLE

## (undated) DEVICE — CLAMP BX HOT RAD JAW 3

## (undated) DEVICE — GOWN LG

## (undated) DEVICE — DRSG 2X2

## (undated) DEVICE — TUBING MEDI-VAC W MAXIGRIP CONNECTORS 1/4"X6'

## (undated) DEVICE — UNDERPAD LINEN SAVER 17 X 24"

## (undated) DEVICE — ELCTR GROUNDING PAD ADULT COVIDIEN

## (undated) DEVICE — DENTURE CUP PINK

## (undated) DEVICE — DRSG CURITY GAUZE SPONGE 4 X 4" 12-PLY NON-STERILE

## (undated) DEVICE — DRSG BANDAID 0.75X3"

## (undated) DEVICE — BASIN EMESIS 10IN GRADUATED MAUVE

## (undated) DEVICE — ELCTR ECG CONDUCTIVE ADHESIVE

## (undated) DEVICE — PACK IV START WITH CHG

## (undated) DEVICE — CONTAINER FORMALIN 80ML YELLOW

## (undated) DEVICE — LUBRICATING JELLY HR ONE SHOT 3G

## (undated) DEVICE — CATH IV SAFE BC 22G X 1" (BLUE)

## (undated) DEVICE — TUBING IV SET GRAVITY 3Y 100" MACRO

## (undated) DEVICE — TUBING SUCTION NONCONDUCTIVE 6MM X 12FT